# Patient Record
Sex: MALE | Race: WHITE | NOT HISPANIC OR LATINO | Employment: OTHER | URBAN - METROPOLITAN AREA
[De-identification: names, ages, dates, MRNs, and addresses within clinical notes are randomized per-mention and may not be internally consistent; named-entity substitution may affect disease eponyms.]

---

## 2017-01-30 ENCOUNTER — TRANSFERRED RECORDS (OUTPATIENT)
Dept: HEALTH INFORMATION MANAGEMENT | Facility: CLINIC | Age: 82
End: 2017-01-30

## 2017-03-17 NOTE — PATIENT INSTRUCTIONS
Change the blood pressure medicine from lisinopril to amlodipine and take that once a day.  If the leg cramps do not go away over the next 2 weeks let me know.  Preventive Health Recommendations:       Male Ages 65 and over    Yearly exam:             See your health care provider every year in order to  o   Review health changes.   o   Discuss preventive care.    o   Review your medicines if your doctor has prescribed any.    Talk with your health care provider about whether you should have a test to screen for prostate cancer (PSA).    Every 3 years, have a diabetes test (fasting glucose). If you are at risk for diabetes, you should have this test more often.    Every 5 years, have a cholesterol test. Have this test more often if you are at risk for high cholesterol or heart disease.     Every 10 years, have a colonoscopy. Or, have a yearly FIT test (stool test). These exams will check for colon cancer.    Talk to with your health care provider about screening for Abdominal Aortic Aneurysm if you have a family history of AAA or have a history of smoking.  Shots:     Get a flu shot each year.     Get a tetanus shot every 10 years.     Talk to your doctor about your pneumonia vaccines. There are now two you should receive - Pneumovax (PPSV 23) and Prevnar (PCV 13).    Talk to your doctor about a shingles vaccine.     Talk to your doctor about the hepatitis B vaccine.  Nutrition:     Eat at least 5 servings of fruits and vegetables each day.     Eat whole-grain bread, whole-wheat pasta and brown rice instead of white grains and rice.     Talk to your doctor about Calcium and Vitamin D.   Lifestyle    Exercise for at least 150 minutes a week (30 minutes a day, 5 days a week). This will help you control your weight and prevent disease.     Limit alcohol to one drink per day.     No smoking.     Wear sunscreen to prevent skin cancer.     See your dentist every six months for an exam and cleaning.     See your eye doctor  every 1 to 2 years to screen for conditions such as glaucoma, macular degeneration and cataracts.

## 2017-03-17 NOTE — PROGRESS NOTES
SUBJECTIVE:                                                            Jose Ray is a 82 year old male who presents for Preventive Visit.    The patient feels fine, active but not working out reg.  Has had urol follow up and concepción.  No c/o on review of systems x noct leg cramps, once a week or so, not in day, no maritza.  No other c/o               Past Medical History:      Past Medical History   Diagnosis Date     Complex renal cyst 6/14     seen on us done for htn, fu 6 months, fu done 8/15 and stable     Eczema      Gallstone 2010     seen on ct for renal stone     HTN (hypertension) 6/14     us done and no hydro but renal cyst     Hx of colonoscopy 2011     nl     Hydronephrosis of left kidney 2010     seen on above ct     Nephrolithiasis 2010     seen on ct, hydronephrosis seen as well     Prostate cancer (H) 2008     had surgery, D.r Michelle     Skin cancer, basal cell 2002, 2008     had xrt 2002, mohs surgery 2008     Thrombophlebitis 1985             Past Surgical History:      Past Surgical History   Procedure Laterality Date     C nonspecific procedure  14, 36     HERNIORRHAPHY     C nonspecific procedure  1985     VEIN STRIPPING     Mohs surgery  2008, 2011     basal cell     Turp  2008     prostate cancer     Circumcision  3/12/2013     Procedure: CIRCUMCISION;  CIRCUMCISION;  Surgeon: Stevenson Martinez MD;  Location: [facility]             Social History:     Social History     Social History     Marital status:      Spouse name: N/A     Number of children: 3     Years of education: N/A     Occupational History     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     Social History Main Topics     Smoking status: Never Smoker     Smokeless tobacco: Never Used     Alcohol use 0.0 oz/week     0 Standard drinks or equivalent per week      Comment: 0-2 drinks per week     Drug use: No     Sexual activity: No     Other Topics Concern     Not on file     Social History Narrative             Family History:   reviewed          "Allergies:     Allergies   Allergen Reactions     Cat Hair [Cats]      Pneumococcal Vaccine Rash             Medications:     Current Outpatient Prescriptions   Medication Sig Dispense Refill     lisinopril (PRINIVIL/ZESTRIL) 10 MG tablet Take 1 tablet (10 mg) by mouth daily 90 tablet 3     amLODIPine (NORVASC) 5 MG tablet Take 1 tablet (5 mg) by mouth daily 30 tablet 3     Cholecalciferol (VITAMIN D PO) Take  by mouth. 2300 IU daily       Multiple vitamin  s TABS Take 1 tablet by mouth daily.       Omega-3 Fatty Acids (SALMON OIL-1000 PO) Take 1 capsule by mouth 2 times daily.       Magnesium 400 MG CAPS Take 1 capsule by mouth daily.       OSTEO BI-FLEX ADV TRIPLE ST OR TABS 1 po bid       OS-REY ULTRA 600 MG OR TABS 1 TABLET TWICE DAILY       VITAMIN C 1000 MG OR TABS 1 TABLET DAILY       GARLIC 600 MG OR CAPS None Entered       ASPIRIN 325 MG OR TBEC ONE DAILY 100 3     [DISCONTINUED] lisinopril (PRINIVIL,ZESTRIL) 10 MG tablet Take 1 tablet (10 mg) by mouth daily 90 tablet 3               Review of Systems:   The 10 point Review of Systems is negative other than noted in the HPI           Physical Exam:   Blood pressure 111/70, pulse 79, temperature 97.2  F (36.2  C), temperature source Tympanic, height 5' 7\" (1.702 m), weight 172 lb (78 kg), SpO2 100 %.    Exam:  Constitutional: healthy appearing, alert and in no distress  Heent: Normocephalic. Head without obvious masses or lesions. PERRLDC, EOMI. Mouth exam within normal limits: tongue, mucous membranes, posterior pharynx all normal, no lesions or abnormalities seen.  Tm's and canals within normal limits bilaterally. Neck supple, no nuchal rigidity or masses. No supraclavicular, or cervical adenopathy. Thyroid symmetric, no masses.  Cardiovascular: Regular rate and rhythm, no murmer, rub or gallops.  JVP not elevated, no edema.  Carotids within normal limits bilaterally, no bruits.  Respiratory: Normal respiratory effort.  Lungs clear, normal flow, no " wheezing or crackles.  Breasts: Normal bilaterally.  No masses or lesions.  Nipples within normal limites.  No axillary lesions or nodes.  Gastrointestinal: Normal active bowel sounds.   Soft, not tender, no masses, guarding or rebound.  No hepatosplenomegaly.   Genitourinary: Rectal not done  Musculoskeletal: extremities normal, no gross deformities noted.  Skin: no suspicious lesions or rashes   Neurologic: Mental status within normal limits.  Speech fluent.  No gross motor abnormalities and gait intact.  Psychiatric: mentation appears normal and affect normal.         Data:   Labs sent        Assessment:   1. Normal cpx  2. Cap, concepción, follow up urol  3. Renal cyst, last us appeared benign  4. Hydronephrosis, none on last us  5. Elevated cholesterol, follow up labs         Plan:   Try change blood pressure to norvasc and see if noct cramps better, send me note 2 weeks  Exercise, diet  Letter with labs  Up to date immunizations, danika Lockett M.D.            Are you in the first 12 months of your Medicare Part B coverage?  No    Healthy Habits:    Do you get at least three servings of calcium containing foods daily (dairy, green leafy vegetables, etc.)? yes    Amount of exercise or daily activities, outside of work: 0 day(s) per week    Problems taking medications regularly No    Medication side effects: No    Have you had an eye exam in the past two years? yes    Do you see a dentist twice per year? yes    Do you have sleep apnea, excessive snoring or daytime drowsiness?yes    COGNITIVE SCREEN  1) Repeat 3 items (Banana, Sunrise, Chair)    2) Clock draw: NORMAL  3) 3 item recall: Recalls 3 objects  Results: 3 items recalled    Mini-CogTM Copyright S Sadia. Licensed by the author for use in [facility]; reprinted with permission (anita@.[facility]). All rights reserved.                Reviewed and updated as needed this visit by clinical staff         Reviewed and updated as needed this visit by  "Provider        Social History   Substance Use Topics     Smoking status: Never Smoker     Smokeless tobacco: Never Used     Alcohol use 0.0 oz/week     0 Standard drinks or equivalent per week      Comment: 0-2 drinks per week       The patient does not drink >3 drinks per day nor >7 drinks per week.    Today's PHQ-2 Score:   PHQ-2 ( 1999 Pf[employer]) 3/17/2016 3/14/2016   Q1: Little interest or pleasure in doing things 0 -   Q2: Feeling down, depressed or hopeless 0 -   PHQ-2 Score 0 -   Little interest or pleasure in doing things - Not at all   Feeling down, depressed or hopeless - Not at all   PHQ-2 Score - 0       Do you feel safe in your environment - Yes    Do you have a Health Care Directive?: Yes: Advance Directive has been received and scanned.    Current providers sharing in care for this patient include:   Patient Care Team:  Hosea Lockett MD as PCP - General (Internal Medicine)      Hearing impairment: No    Ability to successfully perform activities of daily living: Yes, no assistance needed     Fall risk:       Home safety:  none identified  Parul Perez MA      The following health maintenance items are reviewed in Epic and correct as of today:  Health Maintenance   Topic Date Due     FALL RISK ASSESSMENT  03/17/2017     INFLUENZA VACCINE (SYSTEM ASSIGNED)  09/01/2017     ADVANCE DIRECTIVE PLANNING Q5 YRS (NO INBASKET)  08/13/2019     TETANUS IMMUNIZATION (SYSTEM ASSIGNED)  02/21/2023     PNEUMOCOCCAL  Completed            End of Life Planning:  Patient currently has an advanced directive: yes    COUNSELING:  Reviewed preventive health counseling, as reflected in patient instructions       Regular exercise       Healthy diet/nutrition        Estimated body mass index is 27.57 kg/(m^2) as calculated from the following:    Height as of 3/17/16: 5' 7\" (1.702 m).    Weight as of 3/17/16: 176 lb (79.8 kg).     reports that he has never smoked. He has never used smokeless tobacco.      Appropriate " preventive services were discussed with this patient, including applicable screening as appropriate for cardiovascular disease, diabetes, osteopenia/osteoporosis, and glaucoma.  As appropriate for age/gender, discussed screening for colorectal cancer, prostate cancer, breast cancer, and cervical cancer. Checklist reviewing preventive services available has been given to the patient.    Reviewed patients plan of care and provided an AVS. The Basic Care Plan (routine screening as documented in Health Maintenance) for Jose meets the Care Plan requirement. This Care Plan has been established and reviewed with the Patient.    Counseling Resources:  ATP IV Guidelines  Pooled Cohorts Equation Calculator  Breast Cancer Risk Calculator  FRAX Risk Assessment  ICSI Preventive Guidelines  Dietary Guidelines for Americans, 2010  [employer] MyPlate  ASA Prophylaxis  Lung CA Screening    Hosea Lockett MD  [facility] for HPI/ROS submitted by the patient on 3/18/2017   Annual Exam:  Getting at least 3 servings of Calcium per day:: Yes  Bi-annual eye exam:: Yes  Dental care twice a year:: Yes  Sleep apnea or symptoms of sleep apnea:: None  Diet:: Regular (no restrictions)  Frequency of exercise:: None  Taking medications regularly:: Yes  Medication side effects:: None  Additional concerns today:: No  Q1: Little interest or pleasure in doing things: 0=Not at all  Q2: Feeling down, depressed or hopeless: 0=Not at all  PHQ-2 Score: 0

## 2017-03-20 ENCOUNTER — OFFICE VISIT (OUTPATIENT)
Dept: FAMILY MEDICINE | Facility: CLINIC | Age: 82
End: 2017-03-20
Payer: COMMERCIAL

## 2017-03-20 VITALS
SYSTOLIC BLOOD PRESSURE: 111 MMHG | TEMPERATURE: 97.2 F | DIASTOLIC BLOOD PRESSURE: 70 MMHG | WEIGHT: 172 LBS | BODY MASS INDEX: 27 KG/M2 | HEIGHT: 67 IN | HEART RATE: 79 BPM | OXYGEN SATURATION: 100 %

## 2017-03-20 DIAGNOSIS — N13.30 HYDRONEPHROSIS OF LEFT KIDNEY: ICD-10-CM

## 2017-03-20 DIAGNOSIS — I10 BENIGN ESSENTIAL HYPERTENSION: ICD-10-CM

## 2017-03-20 DIAGNOSIS — C61 PROSTATE CANCER (H): ICD-10-CM

## 2017-03-20 DIAGNOSIS — Z00.00 ROUTINE GENERAL MEDICAL EXAMINATION AT A HEALTH CARE FACILITY: Primary | ICD-10-CM

## 2017-03-20 DIAGNOSIS — R79.89 HIGH SERUM CREATINE: Primary | ICD-10-CM

## 2017-03-20 DIAGNOSIS — N28.1 COMPLEX RENAL CYST: ICD-10-CM

## 2017-03-20 DIAGNOSIS — E78.5 HYPERLIPIDEMIA LDL GOAL <160: ICD-10-CM

## 2017-03-20 LAB
ALBUMIN SERPL-MCNC: 3.9 G/DL (ref 3.4–5)
ALP SERPL-CCNC: 69 U/L (ref 40–150)
ALT SERPL W P-5'-P-CCNC: 23 U/L (ref 0–70)
ANION GAP SERPL CALCULATED.3IONS-SCNC: 8 MMOL/L (ref 3–14)
AST SERPL W P-5'-P-CCNC: 11 U/L (ref 0–45)
BILIRUB SERPL-MCNC: 0.9 MG/DL (ref 0.2–1.3)
BUN SERPL-MCNC: 35 MG/DL (ref 7–30)
CALCIUM SERPL-MCNC: 9.5 MG/DL (ref 8.5–10.1)
CHLORIDE SERPL-SCNC: 106 MMOL/L (ref 94–109)
CHOLEST SERPL-MCNC: 205 MG/DL
CO2 SERPL-SCNC: 27 MMOL/L (ref 20–32)
CREAT SERPL-MCNC: 1.36 MG/DL (ref 0.66–1.25)
ERYTHROCYTE [DISTWIDTH] IN BLOOD BY AUTOMATED COUNT: 13.1 % (ref 10–15)
GFR SERPL CREATININE-BSD FRML MDRD: 50 ML/MIN/1.7M2
GLUCOSE SERPL-MCNC: 103 MG/DL (ref 70–99)
HCT VFR BLD AUTO: 43.5 % (ref 40–53)
HDLC SERPL-MCNC: 117 MG/DL
HGB BLD-MCNC: 14 G/DL (ref 13.3–17.7)
LDLC SERPL CALC-MCNC: 75 MG/DL
MCH RBC QN AUTO: 31.5 PG (ref 26.5–33)
MCHC RBC AUTO-ENTMCNC: 32.2 G/DL (ref 31.5–36.5)
MCV RBC AUTO: 98 FL (ref 78–100)
NONHDLC SERPL-MCNC: 88 MG/DL
PLATELET # BLD AUTO: 275 10E9/L (ref 150–450)
POTASSIUM SERPL-SCNC: 4.4 MMOL/L (ref 3.4–5.3)
PROT SERPL-MCNC: 7.5 G/DL (ref 6.8–8.8)
RBC # BLD AUTO: 4.45 10E12/L (ref 4.4–5.9)
SODIUM SERPL-SCNC: 141 MMOL/L (ref 133–144)
TRIGL SERPL-MCNC: 65 MG/DL
WBC # BLD AUTO: 6 10E9/L (ref 4–11)

## 2017-03-20 PROCEDURE — 85027 COMPLETE CBC AUTOMATED: CPT | Performed by: INTERNAL MEDICINE

## 2017-03-20 PROCEDURE — 99212 OFFICE O/P EST SF 10 MIN: CPT | Mod: 25 | Performed by: INTERNAL MEDICINE

## 2017-03-20 PROCEDURE — 80061 LIPID PANEL: CPT | Performed by: INTERNAL MEDICINE

## 2017-03-20 PROCEDURE — G0439 PPPS, SUBSEQ VISIT: HCPCS | Performed by: INTERNAL MEDICINE

## 2017-03-20 PROCEDURE — 80053 COMPREHEN METABOLIC PANEL: CPT | Performed by: INTERNAL MEDICINE

## 2017-03-20 PROCEDURE — 36415 COLL VENOUS BLD VENIPUNCTURE: CPT | Performed by: INTERNAL MEDICINE

## 2017-03-20 RX ORDER — AMLODIPINE BESYLATE 5 MG/1
5 TABLET ORAL DAILY
Qty: 30 TABLET | Refills: 3 | Status: SHIPPED | OUTPATIENT
Start: 2017-03-20 | End: 2017-04-11

## 2017-03-20 RX ORDER — LISINOPRIL 10 MG/1
10 TABLET ORAL DAILY
Qty: 90 TABLET | Refills: 3 | Status: SHIPPED | OUTPATIENT
Start: 2017-03-20 | End: 2018-05-04

## 2017-03-20 NOTE — NURSING NOTE
"Chief Complaint   Patient presents with     Medicare Visit       Initial /70 (BP Location: Left arm, Cuff Size: Adult Large)  Pulse 79  Temp 97.2  F (36.2  C) (Tympanic)  Ht 5' 7\" (1.702 m)  Wt 172 lb (78 kg)  SpO2 100%  BMI 26.94 kg/m2 Estimated body mass index is 26.94 kg/(m^2) as calculated from the following:    Height as of this encounter: 5' 7\" (1.702 m).    Weight as of this encounter: 172 lb (78 kg).  Medication Reconciliation: complete     Parul Perez MA    "

## 2017-03-20 NOTE — MR AVS SNAPSHOT
After Visit Summary   3/20/2017    Jose Ray    MRN: [id number]           Patient Information     Date Of Birth          1935        Visit Information        Provider Department      3/20/2017 11:00 AM Hosea Lockett MD [facility]        Today's Diagnoses     Routine general medical examination at a health care facility    -  1    Benign essential hypertension        Prostate Cancer-8/08          Care Instructions    Change the blood pressure medicine from lisinopril to amlodipine and take that once a day.  If the leg cramps do not go away over the next 2 weeks let me know.  Preventive Health Recommendations:       Male Ages 65 and over    Yearly exam:             See your health care provider every year in order to  o   Review health changes.   o   Discuss preventive care.    o   Review your medicines if your doctor has prescribed any.    Talk with your health care provider about whether you should have a test to screen for prostate cancer (PSA).    Every 3 years, have a diabetes test (fasting glucose). If you are at risk for diabetes, you should have this test more often.    Every 5 years, have a cholesterol test. Have this test more often if you are at risk for high cholesterol or heart disease.     Every 10 years, have a colonoscopy. Or, have a yearly FIT test (stool test). These exams will check for colon cancer.    Talk to with your health care provider about screening for Abdominal Aortic Aneurysm if you have a family history of AAA or have a history of smoking.  Shots:     Get a flu shot each year.     Get a tetanus shot every 10 years.     Talk to your doctor about your pneumonia vaccines. There are now two you should receive - Pneumovax (PPSV 23) and Prevnar (PCV 13).    Talk to your doctor about a shingles vaccine.     Talk to your doctor about the hepatitis B vaccine.  Nutrition:     Eat at least 5 servings of fruits and vegetables each day.     Eat  whole-grain bread, whole-wheat pasta and brown rice instead of white grains and rice.     Talk to your doctor about Calcium and Vitamin D.   Lifestyle    Exercise for at least 150 minutes a week (30 minutes a day, 5 days a week). This will help you control your weight and prevent disease.     Limit alcohol to one drink per day.     No smoking.     Wear sunscreen to prevent skin cancer.     See your dentist every six months for an exam and cleaning.     See your eye doctor every 1 to 2 years to screen for conditions such as glaucoma, macular degeneration and cataracts.        Follow-ups after your visit        Who to contact     If you have questions or need follow up information about today's clinic visit or your schedule please contact [facility] directly at [phone].  Normal or non-critical lab and imaging results will be communicated to you by MyChart, letter or phone within 4 business days after the clinic has received the results. If you do not hear from us within 7 days, please contact the clinic through [employer] or phone. If you have a critical or abnormal lab result, we will notify you by phone as soon as possible.  Submit refill requests through [employer] or call your pharmacy and they will forward the refill request to us. Please allow 3 business days for your refill to be completed.          Additional Information About Your Visit        [employer] Information     [employer] gives you secure access to your electronic health record. If you see a primary care provider, you can also send messages to your care team and make appointments. If you have questions, please call your primary care clinic.  If you do not have a primary care provider, please call [phone] and they will assist you.        Care EveryWhere ID     This is your Care EveryWhere ID. This could be used by other organizations to access your [facility] medical records  [id number]        Your Vitals Were     Pulse Temperature Height  "Pulse Oximetry BMI (Body Mass Index)       79 97.2  F (36.2  C) (Tympanic) 5' 7\" (1.702 m) 100% 26.94 kg/m2        Blood Pressure from Last 3 Encounters:   03/20/17 111/70   03/17/16 133/79   11/17/15 112/63    Weight from Last 3 Encounters:   03/20/17 172 lb (78 kg)   03/17/16 176 lb (79.8 kg)   11/17/15 177 lb 6.4 oz (80.5 kg)              We Performed the Following     CBC with platelets     Comprehensive metabolic panel     Lipid panel reflex to direct LDL          Today's Medication Changes          These changes are accurate as of: 3/20/17 11:12 AM.  If you have any questions, ask your nurse or doctor.               Start taking these medicines.        Dose/Directions    amLODIPine 5 MG tablet   Commonly known as:  NORVASC   Used for:  Benign essential hypertension   Started by:  Hosea Lockett MD        Dose:  5 mg   Take 1 tablet (5 mg) by mouth daily   Quantity:  30 tablet   Refills:  3            Where to get your medicines      These medications were sent to [address] [address]     Phone:  [phone]     amLODIPine 5 MG tablet    lisinopril 10 MG tablet                Primary Care Provider Office Phone # Fax #    Hosea Lockett [phone]       [facility] [address] [place] [id number]        Thank you!     Thank you for choosing [facility]  for your care. Our goal is always to provide you with excellent care. Hearing back from our patients is one way we can continue to improve our services. Please take a few minutes to complete the written survey that you may receive in the mail after your visit with us. Thank you!             Your Updated Medication List - Protect others around you: Learn how to safely use, store and throw away your medicines at www.disposemymeds.org.          This list is accurate as of: 3/20/17 11:12 AM.  Always use your most recent med " list.                   Brand Name Dispense Instructions for use    amLODIPine 5 MG tablet    NORVASC    30 tablet    Take 1 tablet (5 mg) by mouth daily       ascorbic acid 1000 MG Tabs    vitamin C     1 TABLET DAILY       aspirin 325 MG EC tablet     100    ONE DAILY       Garlic 600 MG Caps      None Entered       lisinopril 10 MG tablet    PRINIVIL/ZESTRIL    90 tablet    Take 1 tablet (10 mg) by mouth daily       Magnesium 400 MG Caps      Take 1 capsule by mouth daily.       Multiple vitamin  s Tabs      Take 1 tablet by mouth daily.       OS-REY ULTRA 600 MG Tabs      1 TABLET TWICE DAILY       OSTEO BI-FLEX ADV TRIPLE ST Tabs      1 po bid       SALMON OIL-1000 PO      Take 1 capsule by mouth 2 times daily.       VITAMIN D PO      Take  by mouth. 2300 IU daily

## 2017-03-21 NOTE — PROGRESS NOTES
It was a pleasure to see you.  I wanted to let you know your test results.  Please let me know if you are not able to view them.    For the most part your labs look very good including your blood salts, sugar test, liver tests and proteins and your complete blood count.    Your cholesterol is a bit high at 205.  However, as you know you have a ton of hdl or good cholesterol and the ldl or bad is just fine.  In other words the numbers are super.    One of your kidney tests, the creatinine is just a bit higher this year. I am not worried, but to be safe I would like you to come back in 3 months just to repeat this lab.  You do not need to see me or fast but call before you come.    I am happy to bring you this overall excellent report.  I believe your overall health is very good.  If you have any questions please call me.    Hosea Lockett M.D.

## 2017-06-29 DIAGNOSIS — R79.89 HIGH SERUM CREATINE: ICD-10-CM

## 2017-06-29 PROCEDURE — 36415 COLL VENOUS BLD VENIPUNCTURE: CPT | Performed by: INTERNAL MEDICINE

## 2017-06-29 PROCEDURE — 82565 ASSAY OF CREATININE: CPT | Performed by: INTERNAL MEDICINE

## 2017-06-30 LAB
CREAT SERPL-MCNC: 1.23 MG/DL (ref 0.66–1.25)
GFR SERPL CREATININE-BSD FRML MDRD: 56 ML/MIN/1.7M2

## 2017-06-30 NOTE — PROGRESS NOTES
Hello,    Your kidney test is back down to normal which is great, so no problems.  If you have any questions please call me.    Hosea Lockett M.D.

## 2018-04-09 DIAGNOSIS — I10 BENIGN ESSENTIAL HYPERTENSION: ICD-10-CM

## 2018-04-09 NOTE — TELEPHONE ENCOUNTER
"amLODIPine (NORVASC) 5 MG tablet 90 tablet 3 4/11/2017       Last Written Prescription Date:  04/11/2017  Last Fill Quantity: 90,  # refills: 3   Last office visit: 3/20/2017 with prescribing provider:     Future Office Visit:  Unknown    Requested Prescriptions   Pending Prescriptions Disp Refills     amLODIPine (NORVASC) 5 MG tablet [Pharmacy Med Name: AMLODIPINE 5 MG TAB 5 TAB] 90 tablet 3     Sig: TAKE 1 TABLET BY MOUTH ONCE DAILY    Calcium Channel Blockers Protocol  Failed    4/9/2018 11:00 AM       Failed - Blood pressure under 140/90 in past 12 months    BP Readings from Last 3 Encounters:   03/20/17 111/70   03/17/16 133/79   11/17/15 112/63                Failed - Recent (12 mo) or future (30 days) visit within the authorizing provider's specialty    Patient had office visit in the last 12 months or has a visit in the next 30 days with authorizing provider or within the authorizing provider's specialty.  See \"Patient Info\" tab in inbasket, or \"Choose Columns\" in Meds & Orders section of the refill encounter.           Passed - Patient is age 18 or older       Passed - Normal serum creatinine on file in past 12 months    Recent Labs   Lab Test  06/29/17   1015   CR  1.23               "

## 2018-04-10 NOTE — TELEPHONE ENCOUNTER
To TC:  Please outreach to patient.  He is overdue for office visit with PCP.  Thank you.  Tameka Olvera RN

## 2018-04-18 RX ORDER — AMLODIPINE BESYLATE 5 MG/1
TABLET ORAL
Qty: 90 TABLET | Refills: 3 | Status: SHIPPED | OUTPATIENT
Start: 2018-04-18 | End: 2018-05-04

## 2018-04-18 NOTE — TELEPHONE ENCOUNTER
Pt is scheduled for Friday 5/4 at 2:30 pm with PCP.  Can we send in refill of medication as he is out and had to get a 3 day from the pharmacy.

## 2018-04-18 NOTE — TELEPHONE ENCOUNTER
To PCP:     Pt has OV scheduled for 5/4- Can he have 1 yr supply now rather than #30.     Please review and authorize if appropriate,     Thank you,   May FLORES RN

## 2018-05-04 ENCOUNTER — OFFICE VISIT (OUTPATIENT)
Dept: FAMILY MEDICINE | Facility: CLINIC | Age: 83
End: 2018-05-04
Payer: COMMERCIAL

## 2018-05-04 VITALS
SYSTOLIC BLOOD PRESSURE: 117 MMHG | TEMPERATURE: 97.6 F | DIASTOLIC BLOOD PRESSURE: 68 MMHG | HEIGHT: 67 IN | WEIGHT: 170.1 LBS | BODY MASS INDEX: 26.7 KG/M2 | HEART RATE: 68 BPM | OXYGEN SATURATION: 96 %

## 2018-05-04 DIAGNOSIS — C61 PROSTATE CANCER (H): Primary | ICD-10-CM

## 2018-05-04 DIAGNOSIS — N28.1 COMPLEX RENAL CYST: ICD-10-CM

## 2018-05-04 DIAGNOSIS — E78.5 HYPERLIPIDEMIA LDL GOAL <160: ICD-10-CM

## 2018-05-04 DIAGNOSIS — I10 BENIGN ESSENTIAL HYPERTENSION: ICD-10-CM

## 2018-05-04 LAB
ERYTHROCYTE [DISTWIDTH] IN BLOOD BY AUTOMATED COUNT: 13.2 % (ref 10–15)
HCT VFR BLD AUTO: 42.6 % (ref 40–53)
HGB BLD-MCNC: 14.1 G/DL (ref 13.3–17.7)
MCH RBC QN AUTO: 32.6 PG (ref 26.5–33)
MCHC RBC AUTO-ENTMCNC: 33.1 G/DL (ref 31.5–36.5)
MCV RBC AUTO: 98 FL (ref 78–100)
PLATELET # BLD AUTO: 267 10E9/L (ref 150–450)
RBC # BLD AUTO: 4.33 10E12/L (ref 4.4–5.9)
WBC # BLD AUTO: 6.1 10E9/L (ref 4–11)

## 2018-05-04 PROCEDURE — 85027 COMPLETE CBC AUTOMATED: CPT | Performed by: INTERNAL MEDICINE

## 2018-05-04 PROCEDURE — 80061 LIPID PANEL: CPT | Performed by: INTERNAL MEDICINE

## 2018-05-04 PROCEDURE — 84153 ASSAY OF PSA TOTAL: CPT | Performed by: INTERNAL MEDICINE

## 2018-05-04 PROCEDURE — 36415 COLL VENOUS BLD VENIPUNCTURE: CPT | Performed by: INTERNAL MEDICINE

## 2018-05-04 PROCEDURE — 99214 OFFICE O/P EST MOD 30 MIN: CPT | Performed by: INTERNAL MEDICINE

## 2018-05-04 PROCEDURE — 80048 BASIC METABOLIC PNL TOTAL CA: CPT | Performed by: INTERNAL MEDICINE

## 2018-05-04 RX ORDER — AMLODIPINE BESYLATE 5 MG/1
5 TABLET ORAL DAILY
Qty: 90 TABLET | Refills: 3 | Status: SHIPPED | OUTPATIENT
Start: 2018-05-04 | End: 2019-04-08

## 2018-05-04 NOTE — MR AVS SNAPSHOT
After Visit Summary   5/4/2018    Jose Ray    MRN: [id number]           Patient Information     Date Of Birth          1935        Visit Information        Provider Department      5/4/2018 2:30 PM Hosea Lockett MD [facility]        Today's Diagnoses     Prostate Cancer-8/08    -  1    Hyperlipidemia LDL goal <160        Benign essential hypertension        Complex renal cyst           Follow-ups after your visit        Future tests that were ordered for you today     Open Future Orders        Priority Expected Expires Ordered    US Renal Limited Routine 5/4/2018 5/4/2019 5/4/2018            Who to contact     If you have questions or need follow up information about today's clinic visit or your schedule please contact [facility] directly at [phone].  Normal or non-critical lab and imaging results will be communicated to you by MyChart, letter or phone within 4 business days after the clinic has received the results. If you do not hear from us within 7 days, please contact the clinic through MyChart or phone. If you have a critical or abnormal lab result, we will notify you by phone as soon as possible.  Submit refill requests through [employer] or call your pharmacy and they will forward the refill request to us. Please allow 3 business days for your refill to be completed.          Additional Information About Your Visit        MyChart Information     [employer] gives you secure access to your electronic health record. If you see a primary care provider, you can also send messages to your care team and make appointments. If you have questions, please call your primary care clinic.  If you do not have a primary care provider, please call [phone] and they will assist you.        Care EveryWhere ID     This is your Care EveryWhere ID. This could be used by other organizations to access your [facility] medical records  [id number]        Your Vitals Were   "   Pulse Temperature Height Pulse Oximetry BMI (Body Mass Index)       68 97.6  F (36.4  C) (Oral) 5' 7\" (1.702 m) 96% 26.64 kg/m2        Blood Pressure from Last 3 Encounters:   05/04/18 117/68   03/20/17 111/70   03/17/16 133/79    Weight from Last 3 Encounters:   05/04/18 170 lb 1.6 oz (77.2 kg)   03/20/17 172 lb (78 kg)   03/17/16 176 lb (79.8 kg)              We Performed the Following     Basic metabolic panel     CBC with platelets     Lipid panel reflex to direct LDL Non-fasting     PSA tumor marker          Today's Medication Changes          These changes are accurate as of 5/4/18  2:44 PM.  If you have any questions, ask your nurse or doctor.               These medicines have changed or have updated prescriptions.        Dose/Directions    amLODIPine 5 MG tablet   Commonly known as:  NORVASC   This may have changed:  See the new instructions.   Used for:  Benign essential hypertension   Changed by:  Hosea Lockett MD        Dose:  5 mg   Take 1 tablet (5 mg) by mouth daily   Quantity:  90 tablet   Refills:  3         Stop taking these medicines if you haven't already. Please contact your care team if you have questions.     lisinopril 10 MG tablet   Commonly known as:  PRINIVIL/ZESTRIL   Stopped by:  Hosea Lockett MD                Where to get your medicines      These medications were sent to [facility] [address]  [address] [address]     Phone:  [phone]     amLODIPine 5 MG tablet                Primary Care Provider Office Phone # Fax #    Hosea Lockett [phone] [address] [facility] [address]  [facility] [address]        Equal Access to Services     ENDER ROSENBAUM AH: Hadii woody huffman Soarpit, waaxda luqadaha, qaybta kaalmada adeegyada, mabel hardin. So [facility] [phone].    ATENCIÓN: Si habla español, tiene a langston disposición servicios gratuitos de asistencia lingüística. Llame " al [phone].    We comply with applicable federal civil rights laws and [place] laws. We do not discriminate on the basis of race, color, national origin, age, disability, sex, sexual orientation, or gender identity.            Thank you!     Thank you for choosing [facility]  for your care. Our goal is always to provide you with excellent care. Hearing back from our patients is one way we can continue to improve our services. Please take a few minutes to complete the written survey that you may receive in the mail after your visit with us. Thank you!             Your Updated Medication List - Protect others around you: Learn how to safely use, store and throw away your medicines at www.disposemymeds.org.          This list is accurate as of 5/4/18  2:44 PM.  Always use your most recent med list.                   Brand Name Dispense Instructions for use Diagnosis    amLODIPine 5 MG tablet    NORVASC    90 tablet    Take 1 tablet (5 mg) by mouth daily    Benign essential hypertension       ascorbic acid 1000 MG Tabs    vitamin C     1 TABLET DAILY    Mixed hyperlipidemia, Generalized osteoarthrosis, unspecified site, Embolism and thrombosis of unspecified site, Other and unspecified malignant neoplasm of scalp and skin of neck, Inguinal hernia without mention of obstruction or gangrene, bilateral, (not specified as recurrent)       aspirin 325 MG EC tablet     100    ONE DAILY        Garlic 600 MG Caps      None Entered    Mixed hyperlipidemia, Generalized osteoarthrosis, unspecified site, Embolism and thrombosis of unspecified site, Other and unspecified malignant neoplasm of scalp and skin of neck, Inguinal hernia without mention of obstruction or gangrene, bilateral, (not specified as recurrent)       Magnesium 400 MG Caps      Take 1 capsule by mouth daily.    Routine general medical examination at a health care facility       Multiple vitamin  s Tabs      Take 1 tablet by mouth daily.     Routine general medical examination at a health care facility       OS-REY ULTRA 600 MG Tabs      1 TABLET TWICE DAILY    Mixed hyperlipidemia, Generalized osteoarthrosis, unspecified site, Embolism and thrombosis of unspecified site, Other and unspecified malignant neoplasm of scalp and skin of neck, Inguinal hernia without mention of obstruction or gangrene, bilateral, (not specified as recurrent)       OSTEO BI-FLEX ADV TRIPLE ST Tabs      1 po bid    Mixed hyperlipidemia, Generalized osteoarthrosis, unspecified site, Embolism and thrombosis of unspecified site, Other and unspecified malignant neoplasm of scalp and skin of neck, Inguinal hernia without mention of obstruction or gangrene, bilateral, (not specified as recurrent)       SALMON OIL-1000 PO      Take 1 capsule by mouth 2 times daily.    Routine general medical examination at a health care facility       VITAMIN D PO      Take  by mouth. 2300 IU daily

## 2018-05-04 NOTE — PROGRESS NOTES
The patient is here for follow-up on his medical issues.  He is doing very well although not exercising regularly.  Last year we stopped his lisinopril and put him on amlodipine for his hypertension due to nocturnal leg cramps.  He thinks it may be a little better.  He still has some of them.  He does not have claudication.    The patient has a history of prostate cancer.  He has not seen urology anymore.    The patient has a history of renal cysts.  We will get a follow-up on that.    He otherwise feels quite well.  No other HEENT, cardiovascular, respiratory, GI or  symptoms.    Past Medical History:   Diagnosis Date     Complex renal cyst 6/14    seen on us done for htn, fu 6 months, fu done 8/15 and stable     Eczema      Gallstone 2010    seen on ct for renal stone     HTN (hypertension) 6/14    us done and no hydro but renal cyst     Hx of colonoscopy 2011    nl     Hydronephrosis of left kidney 2010    seen on above ct     Nephrolithiasis 2010    seen on ct, hydronephrosis seen as well     Prostate cancer (H) 2008    had surgery, D.r Michelle     Skin cancer, basal cell 2002, 2008    had xrt 2002, mohs surgery 2008     Thrombophlebitis 1985     Past Surgical History:   Procedure Laterality Date     C NONSPECIFIC PROCEDURE  14, 36    HERNIORRHAPHY     C NONSPECIFIC PROCEDURE  1985    VEIN STRIPPING     CIRCUMCISION  3/12/2013    Procedure: CIRCUMCISION;  CIRCUMCISION;  Surgeon: Stevenson Martinez MD;  [facility]: [facility]     mohs surgery  2008, 2011    basal cell     TURP  2008    prostate cancer     Social History     Social History     Marital status:      Spouse name: N/A     Number of children: 3     Years of education: N/A     Occupational History     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 2300 IU daily       GARLIC 600 MG OR CAPS None Entered       Magnesium 400 MG CAPS Take 1 capsule by mouth daily.       Multiple vitamin  s TABS Take 1 tablet by mouth daily.       Omega-3 Fatty Acids (SALMON OIL-1000 PO) Take 1 capsule by mouth 2 times daily.       OS-REY ULTRA 600 MG OR TABS 1 TABLET TWICE DAILY       OSTEO BI-FLEX ADV TRIPLE ST OR TABS 1 po bid       VITAMIN C 1000 MG OR TABS 1 TABLET DAILY       [DISCONTINUED] amLODIPine (NORVASC) 5 MG tablet TAKE 1 TABLET BY MOUTH ONCE DAILY 90 tablet 3     Allergies   Allergen Reactions     Cat Hair [Cats]      Pneumococcal Vaccine Rash     FAMILY HISTORY NOTED AND REVIEWED    REVIEW OF SYSTEMS: above    PHYSICAL EXAM    /68 (BP Location: Left arm, Patient Position: Chair, Cuff Size: Adult Regular)  Pulse 68  Temp 97.6  F (36.4  C) (Oral)  Ht 5' 7\" (1.702 m)  Wt 170 lb 1.6 oz (77.2 kg)  SpO2 96%  BMI 26.64 kg/m2    Patient appears non toxic  Mouth - tongue midline and within normal limits, mucous membranes and posterior pharynx within normal limits, no lesions seen.  Neck - no masses, lesions or tenderness  Nodes - no supraclavicular, cervical or axially adenopathy .  Lungs - clear, normal flow  Cardiovascular - regular rate and rhythm, no murmer, rub or gallop, no jvp or edema, carotids within normal limits, no bruits.  Abdomen - normal active bowel sounds, soft, non tender, no masses, guarding or rebound, no hepatosplenomegaly      Labs sent, us to be done    ASSESSMENT:  1. Hypertension, controlled  2. Cap, concepción, follow up psa  3. Elevated cholesterol but good ratio  4. Renal cyst, to get follow up us  5. " hcm    PLAN:  shingrx at pharm  Letter with labs  Exercise, diet      Hosea Lockett M.D.

## 2018-05-05 LAB
ANION GAP SERPL CALCULATED.3IONS-SCNC: 10 MMOL/L (ref 3–14)
BUN SERPL-MCNC: 28 MG/DL (ref 7–30)
CALCIUM SERPL-MCNC: 8.6 MG/DL (ref 8.5–10.1)
CHLORIDE SERPL-SCNC: 110 MMOL/L (ref 94–109)
CHOLEST SERPL-MCNC: 195 MG/DL
CO2 SERPL-SCNC: 22 MMOL/L (ref 20–32)
CREAT SERPL-MCNC: 1.06 MG/DL (ref 0.66–1.25)
GFR SERPL CREATININE-BSD FRML MDRD: 67 ML/MIN/1.7M2
GLUCOSE SERPL-MCNC: 92 MG/DL (ref 70–99)
HDLC SERPL-MCNC: 107 MG/DL
LDLC SERPL CALC-MCNC: 69 MG/DL
NONHDLC SERPL-MCNC: 88 MG/DL
POTASSIUM SERPL-SCNC: 4.2 MMOL/L (ref 3.4–5.3)
PSA SERPL-MCNC: 0.03 UG/L (ref 0–4)
SODIUM SERPL-SCNC: 142 MMOL/L (ref 133–144)
TRIGL SERPL-MCNC: 97 MG/DL

## 2018-05-05 NOTE — PROGRESS NOTES
It was a please seeing you.  You should be able to view your labs.    Your labs look very good.  Your psa is nice and low and your blood salts, kidney tests, sugar test, complete blood count and cholesterol are all very good.    If you have any questions please call me.    Hosea Lockett M.D.

## 2018-06-08 ENCOUNTER — HOSPITAL ENCOUNTER (OUTPATIENT)
Dept: ULTRASOUND IMAGING | Facility: CLINIC | Age: 83
Discharge: HOME | End: 2018-06-08
Attending: INTERNAL MEDICINE | Admitting: INTERNAL MEDICINE
Payer: COMMERCIAL

## 2018-06-08 ENCOUNTER — TELEPHONE (OUTPATIENT)
Dept: FAMILY MEDICINE | Facility: CLINIC | Age: 83
End: 2018-06-08

## 2018-06-08 DIAGNOSIS — N28.1 COMPLEX RENAL CYST: ICD-10-CM

## 2018-06-08 PROCEDURE — 76770 US EXAM ABDO BACK WALL COMP: CPT

## 2018-06-08 NOTE — TELEPHONE ENCOUNTER
Dr. Lockett,    Patient had US Renal Limited ordered today for renal cyst.  Should this be US Renal complete as in past?  April with radiology calling to clarify.    She did do both just in case.  Please advise.  Thank you.  Tameka Olvera RN

## 2018-06-10 NOTE — PROGRESS NOTES
Mr. Ray,    Your ultrasound looks good.  The cyst appears to be benign so no follow up is needed.  You have a small non obstructing kidney stone but that is not a problem.  If you have any questions please call me.    Hosea Lockett M.D.

## 2019-01-02 ENCOUNTER — HOSPITAL ENCOUNTER (INPATIENT)
Facility: CLINIC | Age: 84
LOS: 1 days | Discharge: HOME | DRG: 378 | End: 2019-01-03
Attending: EMERGENCY MEDICINE | Admitting: INTERNAL MEDICINE
Payer: COMMERCIAL

## 2019-01-02 DIAGNOSIS — K92.2 GASTROINTESTINAL HEMORRHAGE, UNSPECIFIED GASTROINTESTINAL HEMORRHAGE TYPE: ICD-10-CM

## 2019-01-02 DIAGNOSIS — K62.5 BRIGHT RED BLOOD PER RECTUM: ICD-10-CM

## 2019-01-02 PROBLEM — K92.1 HEMATOCHEZIA: Status: ACTIVE | Noted: 2019-01-02

## 2019-01-02 LAB
ALBUMIN SERPL-MCNC: 3.2 G/DL (ref 3.4–5)
ALP SERPL-CCNC: 62 U/L (ref 40–150)
ALT SERPL W P-5'-P-CCNC: 22 U/L (ref 0–70)
ANION GAP SERPL CALCULATED.3IONS-SCNC: 9 MMOL/L (ref 3–14)
AST SERPL W P-5'-P-CCNC: 15 U/L (ref 0–45)
BASOPHILS # BLD AUTO: 0.1 10E9/L (ref 0–0.2)
BASOPHILS NFR BLD AUTO: 0.9 %
BILIRUB SERPL-MCNC: 0.5 MG/DL (ref 0.2–1.3)
BUN SERPL-MCNC: 31 MG/DL (ref 7–30)
CALCIUM SERPL-MCNC: 8 MG/DL (ref 8.5–10.1)
CHLORIDE SERPL-SCNC: 112 MMOL/L (ref 94–109)
CO2 SERPL-SCNC: 22 MMOL/L (ref 20–32)
CREAT SERPL-MCNC: 1.07 MG/DL (ref 0.66–1.25)
DIFFERENTIAL METHOD BLD: ABNORMAL
EOSINOPHIL # BLD AUTO: 0.5 10E9/L (ref 0–0.7)
EOSINOPHIL NFR BLD AUTO: 9.5 %
ERYTHROCYTE [DISTWIDTH] IN BLOOD BY AUTOMATED COUNT: 13.2 % (ref 10–15)
GFR SERPL CREATININE-BSD FRML MDRD: 64 ML/MIN/{1.73_M2}
GLUCOSE SERPL-MCNC: 113 MG/DL (ref 70–99)
HCT VFR BLD AUTO: 34.1 % (ref 40–53)
HEMOCCULT STL QL: POSITIVE
HGB BLD-MCNC: 10.1 G/DL (ref 13.3–17.7)
HGB BLD-MCNC: 10.1 G/DL (ref 13.3–17.7)
HGB BLD-MCNC: 11.3 G/DL (ref 13.3–17.7)
HGB BLD-MCNC: 9.9 G/DL (ref 13.3–17.7)
IMM GRANULOCYTES # BLD: 0 10E9/L (ref 0–0.4)
IMM GRANULOCYTES NFR BLD: 0.2 %
INR PPP: 1.06 (ref 0.86–1.14)
INTERPRETATION ECG - MUSE: NORMAL
LACTATE BLD-SCNC: 0.6 MMOL/L (ref 0.7–2)
LYMPHOCYTES # BLD AUTO: 1.8 10E9/L (ref 0.8–5.3)
LYMPHOCYTES NFR BLD AUTO: 32.7 %
MCH RBC QN AUTO: 32.2 PG (ref 26.5–33)
MCHC RBC AUTO-ENTMCNC: 33.1 G/DL (ref 31.5–36.5)
MCV RBC AUTO: 97 FL (ref 78–100)
MONOCYTES # BLD AUTO: 0.6 10E9/L (ref 0–1.3)
MONOCYTES NFR BLD AUTO: 10.2 %
NEUTROPHILS # BLD AUTO: 2.5 10E9/L (ref 1.6–8.3)
NEUTROPHILS NFR BLD AUTO: 46.5 %
NRBC # BLD AUTO: 0 10*3/UL
NRBC BLD AUTO-RTO: 0 /100
PLATELET # BLD AUTO: 218 10E9/L (ref 150–450)
POTASSIUM SERPL-SCNC: 3.9 MMOL/L (ref 3.4–5.3)
PROT SERPL-MCNC: 6.4 G/DL (ref 6.8–8.8)
RBC # BLD AUTO: 3.51 10E12/L (ref 4.4–5.9)
SODIUM SERPL-SCNC: 143 MMOL/L (ref 133–144)
WBC # BLD AUTO: 5.4 10E9/L (ref 4–11)

## 2019-01-02 PROCEDURE — 85610 PROTHROMBIN TIME: CPT | Performed by: EMERGENCY MEDICINE

## 2019-01-02 PROCEDURE — 99222 1ST HOSP IP/OBS MODERATE 55: CPT | Mod: AI | Performed by: INTERNAL MEDICINE

## 2019-01-02 PROCEDURE — 96374 THER/PROPH/DIAG INJ IV PUSH: CPT

## 2019-01-02 PROCEDURE — 25000128 H RX IP 250 OP 636: Performed by: INTERNAL MEDICINE

## 2019-01-02 PROCEDURE — 93005 ELECTROCARDIOGRAM TRACING: CPT

## 2019-01-02 PROCEDURE — 25000132 ZZH RX MED GY IP 250 OP 250 PS 637: Performed by: PHYSICIAN ASSISTANT

## 2019-01-02 PROCEDURE — 25000128 H RX IP 250 OP 636: Performed by: EMERGENCY MEDICINE

## 2019-01-02 PROCEDURE — 85018 HEMOGLOBIN: CPT | Performed by: INTERNAL MEDICINE

## 2019-01-02 PROCEDURE — 80053 COMPREHEN METABOLIC PANEL: CPT | Performed by: EMERGENCY MEDICINE

## 2019-01-02 PROCEDURE — 99285 EMERGENCY DEPT VISIT HI MDM: CPT

## 2019-01-02 PROCEDURE — C9113 INJ PANTOPRAZOLE SODIUM, VIA: HCPCS | Performed by: INTERNAL MEDICINE

## 2019-01-02 PROCEDURE — 99207 ZZC CDG-MDM COMPONENT: MEETS LOW - DOWN CODED: CPT | Performed by: INTERNAL MEDICINE

## 2019-01-02 PROCEDURE — C9113 INJ PANTOPRAZOLE SODIUM, VIA: HCPCS | Performed by: EMERGENCY MEDICINE

## 2019-01-02 PROCEDURE — 12000000 ZZH R&B MED SURG/OB

## 2019-01-02 PROCEDURE — 83605 ASSAY OF LACTIC ACID: CPT | Performed by: EMERGENCY MEDICINE

## 2019-01-02 PROCEDURE — 82272 OCCULT BLD FECES 1-3 TESTS: CPT | Performed by: EMERGENCY MEDICINE

## 2019-01-02 PROCEDURE — 36415 COLL VENOUS BLD VENIPUNCTURE: CPT

## 2019-01-02 PROCEDURE — 25000132 ZZH RX MED GY IP 250 OP 250 PS 637: Performed by: INTERNAL MEDICINE

## 2019-01-02 PROCEDURE — 85025 COMPLETE CBC W/AUTO DIFF WBC: CPT | Performed by: EMERGENCY MEDICINE

## 2019-01-02 PROCEDURE — 36415 COLL VENOUS BLD VENIPUNCTURE: CPT | Performed by: INTERNAL MEDICINE

## 2019-01-02 RX ORDER — POTASSIUM CHLORIDE 29.8 MG/ML
20 INJECTION INTRAVENOUS
Status: DISCONTINUED | OUTPATIENT
Start: 2019-01-02 | End: 2019-01-03 | Stop reason: HOSPADM

## 2019-01-02 RX ORDER — PROCHLORPERAZINE 25 MG
12.5 SUPPOSITORY, RECTAL RECTAL EVERY 12 HOURS PRN
Status: DISCONTINUED | OUTPATIENT
Start: 2019-01-02 | End: 2019-01-03 | Stop reason: HOSPADM

## 2019-01-02 RX ORDER — ACETAMINOPHEN 325 MG/1
650 TABLET ORAL EVERY 4 HOURS PRN
Status: DISCONTINUED | OUTPATIENT
Start: 2019-01-02 | End: 2019-01-03 | Stop reason: HOSPADM

## 2019-01-02 RX ORDER — NALOXONE HYDROCHLORIDE 0.4 MG/ML
.1-.4 INJECTION, SOLUTION INTRAMUSCULAR; INTRAVENOUS; SUBCUTANEOUS
Status: DISCONTINUED | OUTPATIENT
Start: 2019-01-02 | End: 2019-01-03 | Stop reason: HOSPADM

## 2019-01-02 RX ORDER — POTASSIUM CHLORIDE 1500 MG/1
20-40 TABLET, EXTENDED RELEASE ORAL
Status: DISCONTINUED | OUTPATIENT
Start: 2019-01-02 | End: 2019-01-03 | Stop reason: HOSPADM

## 2019-01-02 RX ORDER — MAGNESIUM CARB/ALUMINUM HYDROX 105-160MG
296 TABLET,CHEWABLE ORAL ONCE
Status: COMPLETED | OUTPATIENT
Start: 2019-01-03 | End: 2019-01-03

## 2019-01-02 RX ORDER — GUAIFENESIN 600 MG/1
600 TABLET, EXTENDED RELEASE ORAL 2 TIMES DAILY
COMMUNITY
End: 2020-02-25

## 2019-01-02 RX ORDER — POLYETHYLENE GLYCOL 3350 17 G/17G
238 POWDER, FOR SOLUTION ORAL ONCE
Status: COMPLETED | OUTPATIENT
Start: 2019-01-02 | End: 2019-01-02

## 2019-01-02 RX ORDER — BISACODYL 10 MG
10 SUPPOSITORY, RECTAL RECTAL DAILY PRN
Status: DISCONTINUED | OUTPATIENT
Start: 2019-01-02 | End: 2019-01-03 | Stop reason: HOSPADM

## 2019-01-02 RX ORDER — AMOXICILLIN 250 MG
1 CAPSULE ORAL 2 TIMES DAILY PRN
Status: DISCONTINUED | OUTPATIENT
Start: 2019-01-02 | End: 2019-01-03 | Stop reason: HOSPADM

## 2019-01-02 RX ORDER — BISACODYL 5 MG
10 TABLET, DELAYED RELEASE (ENTERIC COATED) ORAL ONCE
Status: COMPLETED | OUTPATIENT
Start: 2019-01-02 | End: 2019-01-02

## 2019-01-02 RX ORDER — AMOXICILLIN 250 MG
2 CAPSULE ORAL 2 TIMES DAILY PRN
Status: DISCONTINUED | OUTPATIENT
Start: 2019-01-02 | End: 2019-01-03 | Stop reason: HOSPADM

## 2019-01-02 RX ORDER — POLYETHYLENE GLYCOL 3350 17 G/17G
17 POWDER, FOR SOLUTION ORAL DAILY PRN
Status: DISCONTINUED | OUTPATIENT
Start: 2019-01-02 | End: 2019-01-03 | Stop reason: HOSPADM

## 2019-01-02 RX ORDER — POTASSIUM CHLORIDE 1.5 G/1.58G
20-40 POWDER, FOR SOLUTION ORAL
Status: DISCONTINUED | OUTPATIENT
Start: 2019-01-02 | End: 2019-01-03 | Stop reason: HOSPADM

## 2019-01-02 RX ORDER — POTASSIUM CHLORIDE 7.45 MG/ML
10 INJECTION INTRAVENOUS
Status: DISCONTINUED | OUTPATIENT
Start: 2019-01-02 | End: 2019-01-03 | Stop reason: HOSPADM

## 2019-01-02 RX ORDER — IBUPROFEN 200 MG
400 TABLET ORAL 3 TIMES DAILY PRN
Status: ON HOLD | COMMUNITY
End: 2019-01-03

## 2019-01-02 RX ORDER — MAGNESIUM SULFATE HEPTAHYDRATE 40 MG/ML
4 INJECTION, SOLUTION INTRAVENOUS EVERY 4 HOURS PRN
Status: DISCONTINUED | OUTPATIENT
Start: 2019-01-02 | End: 2019-01-03 | Stop reason: HOSPADM

## 2019-01-02 RX ORDER — ONDANSETRON 2 MG/ML
4 INJECTION INTRAMUSCULAR; INTRAVENOUS EVERY 6 HOURS PRN
Status: DISCONTINUED | OUTPATIENT
Start: 2019-01-02 | End: 2019-01-03 | Stop reason: HOSPADM

## 2019-01-02 RX ORDER — PROCHLORPERAZINE MALEATE 5 MG
5 TABLET ORAL EVERY 6 HOURS PRN
Status: DISCONTINUED | OUTPATIENT
Start: 2019-01-02 | End: 2019-01-03 | Stop reason: HOSPADM

## 2019-01-02 RX ORDER — AMLODIPINE BESYLATE 5 MG/1
5 TABLET ORAL DAILY
Status: DISCONTINUED | OUTPATIENT
Start: 2019-01-02 | End: 2019-01-03 | Stop reason: HOSPADM

## 2019-01-02 RX ORDER — CALCIUM CARBONATE 500 MG/1
1000 TABLET, CHEWABLE ORAL 4 TIMES DAILY PRN
Status: DISCONTINUED | OUTPATIENT
Start: 2019-01-02 | End: 2019-01-03 | Stop reason: HOSPADM

## 2019-01-02 RX ORDER — POTASSIUM CL/LIDO/0.9 % NACL 10MEQ/0.1L
10 INTRAVENOUS SOLUTION, PIGGYBACK (ML) INTRAVENOUS
Status: DISCONTINUED | OUTPATIENT
Start: 2019-01-02 | End: 2019-01-03 | Stop reason: HOSPADM

## 2019-01-02 RX ORDER — ONDANSETRON 4 MG/1
4 TABLET, ORALLY DISINTEGRATING ORAL EVERY 6 HOURS PRN
Status: DISCONTINUED | OUTPATIENT
Start: 2019-01-02 | End: 2019-01-03 | Stop reason: HOSPADM

## 2019-01-02 RX ORDER — SODIUM CHLORIDE 9 MG/ML
INJECTION, SOLUTION INTRAVENOUS CONTINUOUS
Status: DISCONTINUED | OUTPATIENT
Start: 2019-01-02 | End: 2019-01-03 | Stop reason: HOSPADM

## 2019-01-02 RX ORDER — TRIAMCINOLONE ACETONIDE 1 MG/G
CREAM TOPICAL DAILY PRN
COMMUNITY

## 2019-01-02 RX ADMIN — POLYETHYLENE GLYCOL 3350 238 G: 17 POWDER, FOR SOLUTION ORAL at 16:30

## 2019-01-02 RX ADMIN — SODIUM CHLORIDE: 9 INJECTION, SOLUTION INTRAVENOUS at 22:49

## 2019-01-02 RX ADMIN — BISACODYL 10 MG: 5 TABLET, COATED ORAL at 15:04

## 2019-01-02 RX ADMIN — SODIUM CHLORIDE: 9 INJECTION, SOLUTION INTRAVENOUS at 04:29

## 2019-01-02 RX ADMIN — PANTOPRAZOLE SODIUM 40 MG: 40 INJECTION, POWDER, FOR SOLUTION INTRAVENOUS at 15:04

## 2019-01-02 RX ADMIN — PANTOPRAZOLE SODIUM 40 MG: 40 INJECTION, POWDER, FOR SOLUTION INTRAVENOUS at 03:33

## 2019-01-02 RX ADMIN — Medication 1 LOZENGE: at 18:49

## 2019-01-02 RX ADMIN — AMLODIPINE BESYLATE 5 MG: 5 TABLET ORAL at 09:36

## 2019-01-02 RX ADMIN — SODIUM CHLORIDE: 9 INJECTION, SOLUTION INTRAVENOUS at 13:48

## 2019-01-02 ASSESSMENT — ENCOUNTER SYMPTOMS
BLOOD IN STOOL: 1
DIARRHEA: 1
VOMITING: 0
FEVER: 0
ABDOMINAL PAIN: 0

## 2019-01-02 ASSESSMENT — MIFFLIN-ST. JEOR: SCORE: 1463.76

## 2019-01-02 ASSESSMENT — ACTIVITIES OF DAILY LIVING (ADL)
ADLS_ACUITY_SCORE: 10

## 2019-01-02 NOTE — CONSULTS
GASTROENTEROLOGY CONSULTATION     Jose Ray   [address] [place]  [facility] [address]   83 year old male   Admission Date/Time: 1/2/2019   Encounter Date: 1/2/2019  Primary Care Provider: Hosea Lockett     Referring / Attending Physician: Lenny Chambers   We were asked to see the patient in consultation by Dr. Chambers for evaluation of GI bleed.     HPI: Jose Ray is a 83 year old male with a past medical history significant for HTN, eczema, thrombophlebitis prostate cancer and a complex renal cyst who presented to the emergency department last night for evaluation of rectal bleeding. He returned home from a trip to [facility] and [place] about 5 days ago and yesterday he began passing bright red blood per rectum. He denies any melena. He also denies nausea, vomiting or abdominal pain. He had been taking ibuprofen when overseas as he was doing quite a bit of walking.  In the emergency department he was found to be slightly hypertensive but otherwise had normal vitals.  Labs were checked which were remarkable for a white count of 5.4, hemoglobin 11.3 and a normal lactic acid.  Fecal guaiac was performed which was positive.  He was admitted to the floor for further evaluation.  This morning the patient states that he continues to pass some bright red blood.  He continues to deny any abdominal pain nausea or vomiting.  He had a similar episode in 2011 and had a colonoscopy at that time through colon and rectal surgery which was unremarkable except for a stool ball covered in blood which was in a diverticulum so it was thought that he had a diverticular bleed at that time.    Past Medical History  Past Medical History:   Diagnosis Date     Complex renal cyst 06/2014    seen on us done for htn, fu 6 months, fu done 8/15 and stable; fu 6/18 simple cyst, a bit larger     Eczema      Gallstone 2010    seen on ct for renal stone     HTN (hypertension) 6/14    us done and no hydro but renal cyst     Hx of  colonoscopy 2011    nl     Hydronephrosis of left kidney 2010    seen on above ct     Nephrolithiasis 2010    seen on ct, hydronephrosis seen as well     Prostate cancer (H) 2008    had surgery, D.r Michelle     Skin cancer, basal cell 2002, 2008    had xrt 2002, mohs surgery 2008     Thrombophlebitis 1985       Past Surgical History  Past Surgical History:   Procedure Laterality Date     C NONSPECIFIC PROCEDURE  14, 36    HERNIORRHAPHY     C NONSPECIFIC PROCEDURE  1985    VEIN STRIPPING     CIRCUMCISION  3/12/2013    Procedure: CIRCUMCISION;  CIRCUMCISION;  Surgeon: Stevenson Martinez MD;  Location: [facility]     mohs surgery  2008, 2011    basal cell     TURP  2008    prostate cancer       Family History  Family History   Problem Relation Age of Onset     Hypertension Mother      Arthritis Mother      Gastrointestinal Disease Mother      Cancer Father         Lung     C.A.D. Paternal Grandmother         ?     Heart Disease Maternal Grandmother         ?       Social History  Social History     Socioeconomic History     Marital status:      Spouse name: Not on file     Number of children: 3     Years of education: Not on file     Highest education level: Not on file   Social Needs     Financial resource strain: Not on file     Food insecurity - worry: Not on file     Food insecurity - inability: Not on file     Transportation needs - medical: Not on file     Transportation needs - non-medical: Not on file   Occupational History     Occupation: [employer]     Employer: RETIRED   Tobacco Use     Smoking status: Never Smoker     Smokeless tobacco: Never Used   Substance and Sexual Activity     Alcohol use: Yes     Alcohol/week: 0.0 oz     Comment: 0-2 drinks per week     Drug use: No     Sexual activity: No   Other Topics Concern     Parent/sibling w/ CABG, MI or angioplasty before 65F 55M? Not Asked   Social History Narrative     Not on file       Medications  Prior to Admission medications    Medication Sig Start  "Date End Date Taking? Authorizing Provider   amLODIPine (NORVASC) 5 MG tablet Take 1 tablet (5 mg) by mouth daily 5/4/18   Hosea Lockett MD   ASPIRIN 325 MG OR TBEC ONE DAILY 2/15/07   Stevenson Chisholm MD   Cholecalciferol (VITAMIN D PO) Take  by mouth. 2300 IU daily    Reported, Patient   GARLIC 600 MG OR CAPS None Entered    Reported, Patient   Magnesium 400 MG CAPS Take 1 capsule by mouth daily.    Reported, Patient   Multiple vitamin  s TABS Take 1 tablet by mouth daily.    Reported, Patient   Omega-3 Fatty Acids (SALMON OIL-1000 PO) Take 1 capsule by mouth 2 times daily.    Reported, Patient   OS-REY ULTRA 600 MG OR TABS 1 TABLET TWICE DAILY    Reported, Patient   OSTEO BI-FLEX ADV TRIPLE ST OR TABS 1 po bid    Reported, Patient   VITAMIN C 1000 MG OR TABS 1 TABLET DAILY    Reported, Patient       Allergies:  Cat hair [cats] and Pneumococcal vaccine    ROS: A ten point review of systems was obtained and negative other than the symptoms noted above in the HPI.     Physical Exam:   /88 (BP Location: Right arm)   Pulse 77   Temp 96.3  F (35.7  C) (Oral)   Resp 16   Ht 1.778 m (5' 10\")   Wt 75.8 kg (167 lb)   SpO2 95%   BMI 23.96 kg/m     Constitutional: age appropriate, alert, no acute distress  Cardiovascular: regular rate and rhythm, no murmurs,rubs or gallops  Respiratory: clear to auscultation bilaterally  Psychiatric: normal pleasant affect  Head: atraumatic, normocephalic  Neck: supple, no thyromegaly  ENT: mucous membranes are moist, no oral lesions are noted  Abdomen: soft, non-tender, non-distended, normally active bowel sound. No masses or hepatosplenomegaly is appreciated. No rebound tenderness or guarding  Neuro: Neurologically intact grossly  Skin: warm, dry, no rashes are noted    ADDITIONAL COMMENTS:   I reviewed the patient's new clinical lab test results.   Recent Labs   Lab Test 01/02/19  0611 01/02/19  0133 05/04/18  1448 03/20/17  1121   WBC  --  5.4 6.1 6.0   HGB 10.1* " 11.3* 14.1 14.0   MCV  --  97 98 98   PLT  --  218 267 275   INR  --  1.06  --   --       Recent Labs   Lab Test 01/02/19  0133 05/04/18  1448 06/29/17  1015 03/20/17  1121    142  --  141   POTASSIUM 3.9 4.2  --  4.4   CHLORIDE 112* 110*  --  106   CO2 22 22  --  27   BUN 31* 28  --  35*   CR 1.07 1.06 1.23 1.36*   ANIONGAP 9 10  --  8   REY 8.0* 8.6  --  9.5      Recent Labs   Lab Test 01/02/19  0133 03/20/17  1121 03/11/16  1005 06/26/14  1118   ALBUMIN 3.2* 3.9 3.6  --    BILITOTAL 0.5 0.9 0.5  --    ALT 22 23 20  --    AST 15 11 11  --    ALKPHOS 62 69 63  --    PROTEIN  --   --   --  Negative      I reviewed the patient's new imaging results.     Assessment: 83-year-old male presenting with painless hematochezia.  His hemoglobin is slightly down from his baseline likely representing some acute blood loss anemia.  The differential includes diverticular bleed or hemorrhoidal bleed.  Ischemic colitis seems less likely given the lack of abdominal pain.  A large polyp or malignancy is also within the differential but seems less likely.  I doubt an upper GI source for his bright red blood per rectum however he has been taking a fair amount of NSAIDs recently so this may need to be considered if the colonoscopy is unremarkable and the bleeding    Plan:   -Clear liquids, npo at midnight  -Colonoscopy tomorrow with Dr Rod  -Continue to follow hgb and stool output  -Transfusions per IM  -Hold NSAIDs and ASA  -GI following    I discussed the patient's findings and plan with Dr. Perez Rod who will also independently see and examine the patient.     Michael Brock PA-C  [facility]  Cell:  [phone] Monday through Friday 5740-2319  Office: [phone]

## 2019-01-02 NOTE — PLAN OF CARE
Pt A&O x4. VSS on RA. Tele NSR. No pain/nausea. NPO ex meds. Passing gas. 1 bright red BM w/ clots this AM. Hgb 10.1- asymptomatic. Dry rash on bilateral feet. Up SBA. Plan for colon prep this evening and colonoscopy tomorrow.

## 2019-01-02 NOTE — PLAN OF CARE
Pt arrived to unit around 0430. A&Ox4. VSS on room air. Tele: NSR. Denies pain. NPO. Denies nausea. Bloody stools x1 overnight. Active BS, nondistended, soft abdomen. Voids in urinal. Rash on tops of bilateral feet. SBA. PIV infusing NS @ 100mL/hr. Rested well overnight. Hgb 11.3.

## 2019-01-02 NOTE — PROGRESS NOTES
Hospitalist update note    Patient admitted earlier this morning by Dr. Chambers for diverticular bleed. Had 1 bloody stool upon admission, none since. Hgb stable. GI recs reviewed.    82 year old male with hx of HTN and prostate cancer s/p prostatectomy who was admitted with ongoing rectal bleeding    1. Acute lower GI bleed, likely due to diverticulosis  2. Acute blood loss anemia  3. Essential hypertension    - Colonoscopy tomorrow, 1/3  - Hgb 10.1 (stable), baseline 14  - Hold PTA aspirin, NSAIDs  - Continues on PTA amlodipine  - GI following, appreciate assistance    BERNIE Stephens MD  Hospitalist  [phone]

## 2019-01-02 NOTE — PROGRESS NOTES
RECEIVING UNIT ED HANDOFF REVIEW    ED Nurse Handoff Report was reviewed by: Kamryn Ying on January 2, 2019 at 3:57 AM

## 2019-01-02 NOTE — PROGRESS NOTES
SPIRITUAL HEALTH SERVICES Progress Note  [facility]    Visit with pt, per his request.  Pt is a friend of [employer] and asked me to invite him to visit.  Eucharistic ministers will also provide daily communion.  Pt and I talked about his involvement with [employer] and other volunteer work in his life.  Pt stated no other needs at this time, but SH team is available per additional need or request.                                                                                                                                                 Sally Pan M.A.  Staff   Pager [phone]  Phone [phone]

## 2019-01-02 NOTE — ED NOTES
"[facility]  ED Nurse Handoff Report    ED Chief complaint: Rectal Bleeding (Rectal bleeding tonight.)      ED Diagnosis:   Final diagnoses:   Bright red blood per rectum   Gastrointestinal hemorrhage, unspecified gastrointestinal hemorrhage type       Code Status: Full Code- Hospitalist to discuss    Allergies:   Allergies   Allergen Reactions     Cat Hair [Cats]      Pneumococcal Vaccine Rash       Activity level - Baseline/Home:  Independent    Activity Level - Current:   Independent     Needed?: No    Isolation: No  Infection: Not Applicable  Bariatric?: No    Vital Signs:   Vitals:    01/02/19 0118 01/02/19 0130   BP: (!) 163/100 131/84   Resp: 18 16   Temp: 97.7  F (36.5  C)    TempSrc: Oral    SpO2: 98% 95%   Weight: 75.8 kg (167 lb)    Height: 1.778 m (5' 10\")        Cardiac Rhythm: ,        Pain level:      Is this patient confused?: No   Does this patient have a guardian?  No         If yes, is there guardianship documents in the Epic \"Code/ACP\" activity?  N/A         Guardian Notified?  N/A  [place] - Suicide Severity Rating Scale Completed?  Yes  If yes, what color did the patient score?  White    Patient Report: Initial Complaint: Patient presents with bloody stool. The patient states that last evening he first noticed the bright red blood in his stools. The patient also notes diarrhea and gas since that time about every hour. The patient notes 6 or 7 episodes. The patient denies any abdominal pain, fever, or vomiting. The patient states he has had blood in his stools once before in July of 2011 for which he was diagnosised with diverticulosis with stool in the sample. He states the bleeding at that time was less severe than today. The patient states that he spent 10 days over [place] in [place] and returned on Dec. 29th.      Focused Assessment: Cards: WDL  Resp: WDL  Nu[place]: WDL  GI: ex frequent bloody stools  Tests Performed: labs, ekg and occult stool  Abnormal Results: "   Results for orders placed or performed during the hospital encounter of 01/02/19   CBC with platelets differential   Result Value Ref Range    WBC 5.4 4.0 - 11.0 10e9/L    RBC Count 3.51 (L) 4.4 - 5.9 10e12/L    Hemoglobin 11.3 (L) 13.3 - 17.7 g/dL    Hematocrit 34.1 (L) 40.0 - 53.0 %    MCV 97 78 - 100 fl    MCH 32.2 26.5 - 33.0 pg    MCHC 33.1 31.5 - 36.5 g/dL    RDW 13.2 10.0 - 15.0 %    Platelet Count 218 150 - 450 10e9/L    Diff Method Automated Method     % Neutrophils 46.5 %    % Lymphocytes 32.7 %    % Monocytes 10.2 %    % Eosinophils 9.5 %    % Basophils 0.9 %    % Immature Granulocytes 0.2 %    Nucleated RBCs 0 0 /100    Absolute Neutrophil 2.5 1.6 - 8.3 10e9/L    Absolute Lymphocytes 1.8 0.8 - 5.3 10e9/L    Absolute Monocytes 0.6 0.0 - 1.3 10e9/L    Absolute Eosinophils 0.5 0.0 - 0.7 10e9/L    Absolute Basophils 0.1 0.0 - 0.2 10e9/L    Abs Immature Granulocytes 0.0 0 - 0.4 10e9/L    Absolute Nucleated RBC 0.0    Comprehensive metabolic panel   Result Value Ref Range    Sodium 143 133 - 144 mmol/L    Potassium 3.9 3.4 - 5.3 mmol/L    Chloride 112 (H) 94 - 109 mmol/L    Carbon Dioxide 22 20 - 32 mmol/L    Anion Gap 9 3 - 14 mmol/L    Glucose 113 (H) 70 - 99 mg/dL    Urea Nitrogen 31 (H) 7 - 30 mg/dL    Creatinine 1.07 0.66 - 1.25 mg/dL    GFR Estimate 64 >60 mL/min/[1.73_m2]    GFR Estimate If Black 74 >60 mL/min/[1.73_m2]    Calcium 8.0 (L) 8.5 - 10.1 mg/dL    Bilirubin Total 0.5 0.2 - 1.3 mg/dL    Albumin 3.2 (L) 3.4 - 5.0 g/dL    Protein Total 6.4 (L) 6.8 - 8.8 g/dL    Alkaline Phosphatase 62 40 - 150 U/L    ALT 22 0 - 70 U/L    AST 15 0 - 45 U/L   INR   Result Value Ref Range    INR 1.06 0.86 - 1.14   Lactic acid whole blood   Result Value Ref Range    Lactic Acid 0.6 (L) 0.7 - 2.0 mmol/L   Occult blood stool (ED Lab POCT Only 3-11)   Result Value Ref Range    Occult Blood Positive (A) NEG^Negative   EKG 12-lead, tracing only   Result Value Ref Range    Interpretation ECG Click View Image link to  view waveform and result        Treatments provided: protonix    Family Comments: spouse bedside    OBS brochure/video discussed/provided to patient/family: N/A              Name of person given brochure if not patient: na              Relationship to patient: na    ED Medications: Medications - No data to display    Drips infusing?:  No    For the majority of the shift this patient was Green.   Interventions performed were na.    Severe Sepsis OR Septic Shock Diagnosis Present: No    To be done/followed up on inpatient unit:  [facility]on    ED NURSE PHONE NUMBER: [phone]

## 2019-01-02 NOTE — PHARMACY-ADMISSION MEDICATION HISTORY
Admission medication history interview status for the 1/2/2019  admission is complete. See EPIC admission navigator for prior to admission medications     Medication history source reliability:Good    Actions taken by pharmacist (provider contacted, etc): Interviewed patient.      Additional medication history information not noted on PTA med list :None    Medication reconciliation/reorder completed by provider prior to medication history? Yes    Time spent in this activity: 20 minutes    Prior to Admission medications    Medication Sig Last Dose Taking? Auth Provider   amLODIPine (NORVASC) 5 MG tablet Take 1 tablet (5 mg) by mouth daily 1/1/2019 at AM Yes Hosea Lockett MD   ascorbic acid 1000 MG TABS tablet Take 1,000 mg by mouth 2 times daily 1/1/2019 at AM Yes Unknown, Entered By History   aspirin (ASA) 325 MG EC tablet Take 325 mg by mouth daily 12/31/2018 at AM Yes Unknown, Entered By History   Cholecalciferol (VITAMIN D PO) Take 2,500 Units by mouth daily 2300 IU daily 1/1/2019 at AM Yes Reported, Patient   GARLIC 600 MG OR CAPS 600mg orally daily. 1/1/2019 at AM Yes Reported, Patient   guaiFENesin (MUCINEX) 600 MG 12 hr tablet Take 600 mg by mouth 2 times daily Past Week at Unknown time Yes Unknown, Entered By History   ibuprofen (ADVIL/MOTRIN) 200 MG tablet Take 400 mg by mouth 3 times daily as needed for mild pain Past Week at Unknown time Yes Unknown, Entered By History   Magnesium 400 MG CAPS Take 1 capsule by mouth every evening Magnesium Glycinate 12/31/2018 at PM Yes Reported, Patient   Multiple vitamin  s TABS Take 1 tablet by mouth daily. 1/1/2019 at AM Yes Reported, Patient   Omega-3 Fatty Acids (SALMON OIL-1000 PO) Take 1 capsule by mouth 2 times daily. 1/1/2019 at AM Yes Reported, Patient   OS-REY ULTRA 600 MG OR TABS 1 TABLET TWICE DAILY 1/1/2019 at AM Yes Reported, Patient   OSTEO BI-FLEX ADV TRIPLE ST OR TABS 1 po bid 1/1/2019 at AM Yes Reported, Patient   triamcinolone (KENALOG) 0.1 %  external cream Apply topically daily as needed for irritation (Feet.) Past Week at Unknown time Yes Unknown, Entered By History       Brenda Rodríguez, PharmD, BCPS

## 2019-01-02 NOTE — ED PROVIDER NOTES
History     Chief Complaint:  Rectal Bleeding     HPI   Jose Ray is a 83 year old male who presents with rectal bleeding. The patient states he has been having blood in his stools. The patient states that around 1730 last night he first noticed the bright red blood in his stools. The patient also notes diarrhea and gas since that time about every hour. The patient notes 6 or 7 episodes. The patient denies any abdominal pain, fever, or vomiting. The patient states he has had blood in his stools once before in July of 2011 for which he was diagnosed with diverticulosis on colonoscopy. He states the bleeding at that time was less severe than today. The patient states that he spent 10 days over [place] in [place].     Allergies:  Cat hair  pneumococcal vaccine      Medications:    Amlodipine  Cholecalciferol    Past Medical History:    Complex renal cyst   Eczema   Gallstone   HTN (hypertension)   Hx of colonoscopy   Hydronephrosis of left kidney   Nephrolithiasis   Prostate cancer    Skin cancer, basal cell   Thrombophlebitis     Past Surgical History:    Herniorrhaphy  Vein stripping  Circumcision  Basal cell   TURP    Family History:    Mother: hypertension, arthritis, gastrointestinal disease  Father: lung cancer  Paternal grandmother: CAD  Maternal grandmother: heart disease    Social History:   The patient was accompanied to the ED by wife.  Smoking Status: Never Smoker  Smokeless Tobacco: Never Used  Alcohol Use: Positive  Marital Status:       Review of Systems   Constitutional: Negative for fever.   Gastrointestinal: Positive for blood in stool and diarrhea. Negative for abdominal pain and vomiting.   All other systems reviewed and are negative.      Physical Exam     Patient Vitals for the past 24 hrs:   BP Temp Temp src Pulse Heart Rate Resp SpO2 Height Weight   01/02/19 0416 (!) 168/91 96.3  F (35.7  C) Oral -- 80 16 95 % -- --   01/02/19 0319 (!) 178/97 -- -- 77 -- 14 95 % -- --  "  01/02/19 0300 -- -- -- -- -- -- 95 % -- --   01/02/19 0130 131/84 -- -- -- 64 16 95 % -- --   01/02/19 0118 (!) 163/100 97.7  F (36.5  C) Oral -- 83 18 98 % 1.778 m (5' 10\") 75.8 kg (167 lb)     Physical Exam  VS: Reviewed per above  HENT: Mucous membranes moist  EYES: sclera anicteric  CV: Rate as noted, regular rhythm.   RESP: Effort normal. Breath sounds are normal bilaterally.  GI: no tenderness, not distended.  NEURO: Alert, moving all extremities  MSK: No deformity of the extremities  SKIN: Warm and dry    Emergency Department Course     ECG:  ECG taken at 0307, ECG read at 0310  Sinus rhythm with premature atrial complexes  Otherwise normal ECG  Rate 70 bpm. KS interval 204 ms. QRS duration 84 ms. QT/QTc 412/444 ms. P-R-T axes 92 -11 32.  No significant change compared to ECG dated 9/30/2009.    Laboratory:  Laboratory findings were communicated with the patient who voiced understanding of the findings.    CBC: WBC 5.4, HGB 11.3(L),   CMP: chloride 112(H), glucose 113(H), BUN 31(H), calcium 8.0(L), albumin 3.2(L), protein total 6.4(L) o/w WNL (Creatinine 1.07)  Lactic acid whole blood: 0.6(L)  INR: 1.06    Occult blood stool: positive (A)    Interventions:  0333 Protonix 40 mg IV    Emergency Department Course:    0133 IV was inserted and blood was drawn for laboratory testing, results above.    0145 Nursing notes and vitals reviewed.    0200 I performed an exam of the patient as documented above.     0241 A rectal exam was preformed and stool was obtained, results above.     0258 Patient charts were reviewed. The patient had a hemoglobin of 14.1 in May of 2018.     0315  I spoke with Dr. Chambers of the Hospitalist service from [facility] regarding patient's presentation, findings, and plan of care.    0330 I personally reviewed the laboratory results with the patient and answered all related questions prior to admittance.    Impression & Plan      Medical Decision Making:  Jose Ray " is a 83 year old male who presents to the emergency department today for evaluation of bright red blood per rectum.  Initial vital signs are without marked derangement.  Exam is notable for lack of abdominal tenderness.  A broad differential was considered including ischemic/infectious colitis, diverticulosis, internal versus external hemorrhoids, aortoenteric fistula, upper GI bleed such as peptic ulcer disease/gastritis, others.  Rectal exam was notable for bright red blood.  Given lack of abdominal pain and normal lactic acid, I did not pursue CT imaging of the abdomen given a lower suspicion for significant infectious or ischemic colitis.  Given the severity of the bleeding by history as well as proximally 2.5 drop in hemoglobin, patient will be admitted to the hospital for further hemoglobin trending and possible GI consultation.  He was given a dose of Protonix prior to admission in the more unlikely event of upper GI bleed.    Diagnosis:    ICD-10-CM    1. Bright red blood per rectum K62.5 Comprehensive metabolic panel     INR     Lactic acid whole blood   2. Gastrointestinal hemorrhage, unspecified gastrointestinal hemorrhage type K92.2         Disposition:   The patient is admitted into the care of Dr. Chambers.    Scribe Disclosure:  INayely, am serving as a scribe at 1:42 AM on 1/2/2019 to document services personally performed by Juventino Lucero MD based on my observations and the provider's statements to me.   EMERGENCY DEPARTMENT       Juventino Lucero MD  01/02/19 0655

## 2019-01-02 NOTE — H&P
History and Physical     Jose Ray MRN# [id number]   YOB: 1935 Age: 83 year old      Date of Admission:  1/2/2019    Primary care provider: Hosea Lockett          Assessment and Plan:   This is a  83 year old male admitted with suspected diverticular bleed.    1. Diverticular bleed.  Doubt infectious etiology.  Will check serial hemoglobins every 6 hours overnight.  Will consult Gastroenterology in the morning to evaluate for possible endoscopy.  I doubt this is an upper GI bleed, but given recent NSAID use, will start IV Protonix BID.  Hold aspirin.    2. Acute blood loss anemia.  Transfuse for hemoglobin less than 7.    3. Hypertension.  Patient is hypertensive in ED.  Will consult amlodipine with hold parameters.    4. Code status.  Patient is full code.          Chief Complaint:   This patient is a 83 year old male who presents with hematochezia.    History is obtained from the patient and his wife.         History of Present Illness:   This is an 83-year-old male with history of previous diverticular bleed in 2011.  4 days prior to admission patient returned following a trip to [facility] and [place] with his wife, daughter, and 2 grandsons.  Patient states he been doing quite a bit of walking and was taking ibuprofen 400 mg twice daily for the last 10 days due to hip pain.  Patient reports that today he had 7-8 episodes of bright red blood per rectum.  He had no associated abdominal pain or cramping.  He denies fevers or chills.  Patient denies nausea or vomiting.  He denies recent antibiotic use.  Patient states that no one else in his party have reported vomiting, diarrhea, or GI bleeding.  Patient is not on any other blood thinners.  He denies lightheadedness, dizziness, syncope, or falls.  He denies nasal congestion, runny nose, sore throat, cough, wheezing, or shortness of breath.  Patient denies chest pain or pressure.  Patient reports symmetrical bilateral lower extremity  swelling that he attributes to increased walking in [place].  Patient denies focal numbness or weakness.             Past Medical History:     Past Medical History:   Diagnosis Date     Complex renal cyst 06/2014    seen on us done for htn, fu 6 months, fu done 8/15 and stable; fu 6/18 simple cyst, a bit larger     Eczema      Gallstone 2010    seen on ct for renal stone     HTN (hypertension) 6/14    us done and no hydro but renal cyst     Hx of colonoscopy 2011    nl     Hydronephrosis of left kidney 2010    seen on above ct     Nephrolithiasis 2010    seen on ct, hydronephrosis seen as well     Prostate cancer (H) 2008    had surgery, D.r Michelle     Skin cancer, basal cell 2002, 2008    had xrt 2002, mohs surgery 2008     Thrombophlebitis 1985             Past Surgical History:     Past Surgical History:   Procedure Laterality Date     C NONSPECIFIC PROCEDURE  14, 36    HERNIORRHAPHY     C NONSPECIFIC PROCEDURE  1985    VEIN STRIPPING     CIRCUMCISION  3/12/2013    Procedure: CIRCUMCISION;  CIRCUMCISION;  Surgeon: Stevenson Martinez MD;  [facility]: [facility]     mohs surgery  2008, 2011    basal cell     TURP  2008    prostate cancer             Social History:     Social History     Tobacco Use     Smoking status: Never Smoker     Smokeless tobacco: Never Used   Substance Use Topics     Alcohol use: Yes     Alcohol/week: 0.0 oz     Comment: 0-2 drinks per week             Family History:   Family history reviewed and is noncontributory other than noted in HPI          Immunizations:     Immunization History   Administered Date(s) Administered     Influenza (IIV3) PF 11/11/2005, 08/28/2015, 09/20/2016, 10/15/2017     Pneumo Conj 13-V (2010&after) 03/17/2016     Pneumococcal 23 valent 09/01/2001     TD (ADULT, 7+) 12/10/2009     TDAP Vaccine (Adacel) 02/21/2013     Zoster vaccine, live 12/10/2009            Allergies:     Allergies   Allergen Reactions     Cat Hair [Cats]      Pneumococcal Vaccine Rash              Medications:     Prior to Admission medications    Medication Sig Start Date End Date Taking? Authorizing Provider   amLODIPine (NORVASC) 5 MG tablet Take 1 tablet (5 mg) by mouth daily 5/4/18   Hosea Lockett MD   ASPIRIN 325 MG OR TBEC ONE DAILY 2/15/07   Stevenson Chisholm MD   Cholecalciferol (VITAMIN D PO) Take  by mouth. 2300 IU daily    Reported, Patient   GARLIC 600 MG OR CAPS None Entered    Reported, Patient   Magnesium 400 MG CAPS Take 1 capsule by mouth daily.    Reported, Patient   Multiple vitamin  s TABS Take 1 tablet by mouth daily.    Reported, Patient   Omega-3 Fatty Acids (SALMON OIL-1000 PO) Take 1 capsule by mouth 2 times daily.    Reported, Patient   OS-REY ULTRA 600 MG OR TABS 1 TABLET TWICE DAILY    Reported, Patient   OSTEO BI-FLEX ADV TRIPLE ST OR TABS 1 po bid    Reported, Patient   VITAMIN C 1000 MG OR TABS 1 TABLET DAILY    Reported, Patient            Review of Systems:   The 10 point Review of Systems performed and is negative other than noted in the HPI           Physical Exam:   Vitals were reviewed  Temp: 97.7  F (36.5  C) Temp src: Oral BP: (!) 178/97 Pulse: 77 Heart Rate: 64 Resp: 14 SpO2: 95 % O2 Device: None (Room air)      This is a well nourished well developed male resting comfortably in bed, no acute distress  Head: atraumatic normocephalic, sclera noninjected and anicterric, oral mucosa moist without lesions or exudates.  Neck: supple without spinal abnormality  Chest: clear to auscultation bilaterally, without wheezes, rhonchi, or rales.  Cardiovascular: regular rate and rhythm, no murmurs, gallops, or rubs, no edema  Abdomen: bowel sounds normal, nontender and nondistended, no hepatosplenomegaly or masses.  Musculoskeletal: normal muscle mass and tone  Skin: no rashes  Lymph: no lymphadenopathy.  Neuro: cranial nerves II-XII intact, strength in all four extremities normal, reflexes normal, coordination normal.         Data:   Data reviewed today: I reviewed all  medications, new labs and imaging results over the last 24 hours. I personally reviewed no images or EKG's today.    Recent Labs   Lab 01/02/19  0133   WBC 5.4   HGB 11.3*   MCV 97      INR 1.06      POTASSIUM 3.9   CHLORIDE 112*   CO2 22   BUN 31*   CR 1.07   ANIONGAP 9   REY 8.0*   *   ALBUMIN 3.2*   PROTTOTAL 6.4*   BILITOTAL 0.5   ALKPHOS 62   ALT 22   AST 15

## 2019-01-03 VITALS
OXYGEN SATURATION: 98 % | DIASTOLIC BLOOD PRESSURE: 85 MMHG | HEART RATE: 68 BPM | HEIGHT: 70 IN | RESPIRATION RATE: 22 BRPM | WEIGHT: 167 LBS | TEMPERATURE: 97.3 F | SYSTOLIC BLOOD PRESSURE: 136 MMHG | BODY MASS INDEX: 23.91 KG/M2

## 2019-01-03 LAB
ANION GAP SERPL CALCULATED.3IONS-SCNC: 7 MMOL/L (ref 3–14)
BASOPHILS # BLD AUTO: 0 10E9/L (ref 0–0.2)
BASOPHILS NFR BLD AUTO: 0.5 %
BUN SERPL-MCNC: 13 MG/DL (ref 7–30)
CALCIUM SERPL-MCNC: 7.7 MG/DL (ref 8.5–10.1)
CHLORIDE SERPL-SCNC: 113 MMOL/L (ref 94–109)
CO2 SERPL-SCNC: 24 MMOL/L (ref 20–32)
COLONOSCOPY: NORMAL
CREAT SERPL-MCNC: 0.94 MG/DL (ref 0.66–1.25)
DIFFERENTIAL METHOD BLD: ABNORMAL
EOSINOPHIL # BLD AUTO: 0.4 10E9/L (ref 0–0.7)
EOSINOPHIL NFR BLD AUTO: 7.5 %
ERYTHROCYTE [DISTWIDTH] IN BLOOD BY AUTOMATED COUNT: 13 % (ref 10–15)
GFR SERPL CREATININE-BSD FRML MDRD: 74 ML/MIN/{1.73_M2}
GLUCOSE SERPL-MCNC: 90 MG/DL (ref 70–99)
HCT VFR BLD AUTO: 30.4 % (ref 40–53)
HGB BLD-MCNC: 10.1 G/DL (ref 13.3–17.7)
HGB BLD-MCNC: 10.3 G/DL (ref 13.3–17.7)
HGB BLD-MCNC: 9.7 G/DL (ref 13.3–17.7)
IMM GRANULOCYTES # BLD: 0 10E9/L (ref 0–0.4)
IMM GRANULOCYTES NFR BLD: 0.2 %
LYMPHOCYTES # BLD AUTO: 1.6 10E9/L (ref 0.8–5.3)
LYMPHOCYTES NFR BLD AUTO: 28.3 %
MCH RBC QN AUTO: 32 PG (ref 26.5–33)
MCHC RBC AUTO-ENTMCNC: 33.2 G/DL (ref 31.5–36.5)
MCV RBC AUTO: 96 FL (ref 78–100)
MONOCYTES # BLD AUTO: 0.6 10E9/L (ref 0–1.3)
MONOCYTES NFR BLD AUTO: 10.1 %
NEUTROPHILS # BLD AUTO: 2.9 10E9/L (ref 1.6–8.3)
NEUTROPHILS NFR BLD AUTO: 53.4 %
NRBC # BLD AUTO: 0 10*3/UL
NRBC BLD AUTO-RTO: 0 /100
PLATELET # BLD AUTO: 211 10E9/L (ref 150–450)
POTASSIUM SERPL-SCNC: 3.9 MMOL/L (ref 3.4–5.3)
RBC # BLD AUTO: 3.16 10E12/L (ref 4.4–5.9)
SODIUM SERPL-SCNC: 144 MMOL/L (ref 133–144)
WBC # BLD AUTO: 5.5 10E9/L (ref 4–11)

## 2019-01-03 PROCEDURE — 25000128 H RX IP 250 OP 636: Performed by: INTERNAL MEDICINE

## 2019-01-03 PROCEDURE — C9113 INJ PANTOPRAZOLE SODIUM, VIA: HCPCS | Performed by: INTERNAL MEDICINE

## 2019-01-03 PROCEDURE — 80048 BASIC METABOLIC PNL TOTAL CA: CPT | Performed by: INTERNAL MEDICINE

## 2019-01-03 PROCEDURE — 36415 COLL VENOUS BLD VENIPUNCTURE: CPT | Performed by: INTERNAL MEDICINE

## 2019-01-03 PROCEDURE — 88305 TISSUE EXAM BY PATHOLOGIST: CPT | Performed by: INTERNAL MEDICINE

## 2019-01-03 PROCEDURE — G0500 MOD SEDAT ENDO SERVICE >5YRS: HCPCS | Performed by: INTERNAL MEDICINE

## 2019-01-03 PROCEDURE — 88305 TISSUE EXAM BY PATHOLOGIST: CPT | Mod: 26 | Performed by: INTERNAL MEDICINE

## 2019-01-03 PROCEDURE — 85025 COMPLETE CBC W/AUTO DIFF WBC: CPT | Performed by: INTERNAL MEDICINE

## 2019-01-03 PROCEDURE — 45385 COLONOSCOPY W/LESION REMOVAL: CPT | Performed by: INTERNAL MEDICINE

## 2019-01-03 PROCEDURE — 25000132 ZZH RX MED GY IP 250 OP 250 PS 637: Performed by: PHYSICIAN ASSISTANT

## 2019-01-03 PROCEDURE — 0DBP8ZZ EXCISION OF RECTUM, VIA NATURAL OR ARTIFICIAL OPENING ENDOSCOPIC: ICD-10-PCS | Performed by: INTERNAL MEDICINE

## 2019-01-03 PROCEDURE — 85018 HEMOGLOBIN: CPT | Performed by: INTERNAL MEDICINE

## 2019-01-03 PROCEDURE — 25000132 ZZH RX MED GY IP 250 OP 250 PS 637: Performed by: INTERNAL MEDICINE

## 2019-01-03 PROCEDURE — 99239 HOSP IP/OBS DSCHRG MGMT >30: CPT | Performed by: INTERNAL MEDICINE

## 2019-01-03 RX ORDER — LIDOCAINE 40 MG/G
CREAM TOPICAL
Status: CANCELLED | OUTPATIENT
Start: 2019-01-03

## 2019-01-03 RX ORDER — LIDOCAINE 40 MG/G
CREAM TOPICAL
Status: DISCONTINUED | OUTPATIENT
Start: 2019-01-03 | End: 2019-01-03 | Stop reason: HOSPADM

## 2019-01-03 RX ORDER — FENTANYL CITRATE 50 UG/ML
INJECTION, SOLUTION INTRAMUSCULAR; INTRAVENOUS PRN
Status: DISCONTINUED | OUTPATIENT
Start: 2019-01-03 | End: 2019-01-03 | Stop reason: HOSPADM

## 2019-01-03 RX ORDER — NALOXONE HYDROCHLORIDE 0.4 MG/ML
.1-.4 INJECTION, SOLUTION INTRAMUSCULAR; INTRAVENOUS; SUBCUTANEOUS
Status: CANCELLED | OUTPATIENT
Start: 2019-01-03 | End: 2019-01-04

## 2019-01-03 RX ORDER — FLUMAZENIL 0.1 MG/ML
0.2 INJECTION, SOLUTION INTRAVENOUS
Status: CANCELLED | OUTPATIENT
Start: 2019-01-03 | End: 2019-01-04

## 2019-01-03 RX ADMIN — AMLODIPINE BESYLATE 5 MG: 5 TABLET ORAL at 08:14

## 2019-01-03 RX ADMIN — PANTOPRAZOLE SODIUM 40 MG: 40 INJECTION, POWDER, FOR SOLUTION INTRAVENOUS at 03:13

## 2019-01-03 RX ADMIN — MAGESIUM CITRATE 296 ML: 1.75 LIQUID ORAL at 05:57

## 2019-01-03 RX ADMIN — Medication 1 LOZENGE: at 09:09

## 2019-01-03 RX ADMIN — SODIUM CHLORIDE: 9 INJECTION, SOLUTION INTRAVENOUS at 08:11

## 2019-01-03 ASSESSMENT — ACTIVITIES OF DAILY LIVING (ADL)
ADLS_ACUITY_SCORE: 10

## 2019-01-03 NOTE — PLAN OF CARE
A&Ox4. VSS on RA. Tele: NSR. Denies pain. Rash on bilateral tops feet. NPO. Denies nausea. Stools yellow, watery; given mag citrate this AM. No bleeding noted. PIV infusing. Hgb checks q 6 hr: last 10.3. Independent. Plan: colonoscopy this AM

## 2019-01-03 NOTE — PLAN OF CARE
A/ox4. VSS on RA. Denied pain. Tele NSR, discontinued this afternoon. 1 watery, yellow stool this shift, no blood noted. Hgb checks q6h, 0600 - 10.1 and 1200 check at 9.7. Patient completed colonoscopy this afternoon, multiple diverticula found - MD aware. Patient safe to discharge. AVS packet reviewed with patient and wife at bedside. Patient to stop taking ibuprofen and ASA until seen by primary MD. PIV removed. Personal belongings accounted for and sent home with patient. Patient ambulated to door 6 by escort services. Patient discharged in stable condition.

## 2019-01-03 NOTE — PLAN OF CARE
A&Ox4. VSS on RA.  NPO. Bowel prep done this evening, some bright red stools pt reports they have become more clear. Colonoscopy tomorrow. C/o of sore throat, using lozenges. PIV infusing NS @ 100 ml/hr. Will continue to monitor.

## 2019-01-03 NOTE — DISCHARGE SUMMARY
[facility]    Discharge Summary  Hospitalist    Date of Admission:  1/2/2019  Date of Discharge:  1/3/2019  3:13 PM  Discharging Provider: Cindy Stephens  Date of Service (when I saw the patient): 01/03/19    Discharge Diagnoses     1. Acute lower GI bleed with acute blood loss anemia due to diverticulosis  2. Essential hypertension  3. Osteoarthritis  4. History of prostate cancer s/p prostatectomy    History of Present Illness   Please see H&P by Dr. Chambers on 1/2/2018    Hospital Course   82 year old male with hx of HTN and prostate cancer s/p prostatectomy who presented on 1/2/2018 with ongoing rectal bleeding. Hgb 9.7 from baseline 14. His bleeding resolved spontaneously during his hospital stay. He underwent a colonoscopy by GI (1/3) which showed diverticulosis in the sigmoid and descending colon, a 5 mm rectal polyp which was resected, and both internal and external hemorrhoids.     - He has been advised to hold his home aspirin and NSAIDs until cleared by his PCP  - Recommend Hgb at next follow up visit  - He not overtly symptomatic despite 5 point drop in Hgb. He was advised to notify his PCP for potential iron supplementation if he were to become symptomatic.     # Discharge Pain Plan  - Patient currently has NO PAIN and is not being prescribed pain medications on discharge.    Cindy Stephens    Significant Results and Procedures   As noted above    Pending Results   None    Code Status   Full Code       Primary Care Physician   Hosea Lockett    Physical Exam   Temp: 97.3  F (36.3  C) Temp src: Oral BP: 136/85 Pulse: 68 Heart Rate: 69 Resp: 22 SpO2: 98 % O2 Device: Nasal cannula Oxygen Delivery: 3 LPM  Vitals:    01/02/19 0118   Weight: 75.8 kg (167 lb)     Vital Signs with Ranges  Temp:  [96  F (35.6  C)-97.3  F (36.3  C)] 97.3  F (36.3  C)  Pulse:  [65-77] 68  Heart Rate:  [64-75] 69  Resp:  [9-31] 22  BP: (123-150)/() 136/85  SpO2:  [91 %-99 %] 98 %  I/O last 3 completed  shifts:  In: 0439 [I.V.:6169]  Out: -     Constitutional: Appears comfortable, NAD  Respiratory: Breathing non-labored. Lungs CTAB - no wheezes/crackles/rhonchi  Cardiovascular: Heart RRR, no m/r/g. No pedal edema.   GI: +BS. Abd soft/NT  Skin: Warm and dry. No rash.  Musculoskeletal: Normal muscle bulk and tone  Neurologic: Alert and appropriate. MOSES  Psychiatric: Calm and cooperative    Discharge Disposition   Discharged to home  Condition at discharge: Satisfactory    Consultations This Hospital Stay   GASTROENTEROLOGY IP CONSULT    Time Spent on this Encounter   ICindy, personally saw the patient today and spent 35 minutes discharging this patient.    Discharge Orders      Reason for your hospital stay    Rectal bleeding which has resolved. We recommend that you discontinue aspirin and ibuprofen     Follow-up and recommended labs and tests     Follow up with primary care provider, Hosea Lockett, within 2 weeks for hospital follow- up.  The following labs/tests are recommended: Hemoglobin.     Activity    Your activity upon discharge: activity as tolerated     Diet    Follow this diet upon discharge: Regular diet     Discharge Medications   Discharge Medication List as of 1/3/2019  2:52 PM      CONTINUE these medications which have NOT CHANGED    Details   amLODIPine (NORVASC) 5 MG tablet Take 1 tablet (5 mg) by mouth daily, Disp-90 tablet, R-3, E-Prescribe      ascorbic acid 1000 MG TABS tablet Take 1,000 mg by mouth 2 times daily, Historical      Cholecalciferol (VITAMIN D PO) Take 2,500 Units by mouth daily 2300 IU daily, Historical      GARLIC 600 MG OR CAPS 600mg orally daily., Historical      guaiFENesin (MUCINEX) 600 MG 12 hr tablet Take 600 mg by mouth 2 times daily, Historical      Magnesium 400 MG CAPS Take 1 capsule by mouth every evening Magnesium Glycinate, Historical      Multiple vitamin  s TABS Take 1 tablet by mouth daily., Historical      Omega-3 Fatty Acids (SALMON OIL-1000 PO)  Take 1 capsule by mouth 2 times daily., Historical      OS-REY ULTRA 600 MG OR TABS 1 TABLET TWICE DAILY, Historical      OSTEO BI-FLEX ADV TRIPLE ST OR TABS 1 po bid, Historical      triamcinolone (KENALOG) 0.1 % external cream Apply topically daily as needed for irritation (Feet.)Historical         STOP taking these medications       aspirin (ASA) 325 MG EC tablet Comments:   Reason for Stopping:         ibuprofen (ADVIL/MOTRIN) 200 MG tablet Comments:   Reason for Stopping:             Allergies   Allergies   Allergen Reactions     Cat Hair [Cats]      Pneumococcal Vaccine Rash     Data   Most Recent 3 CBC's:  Recent Labs   Lab Test 01/03/19  1228 01/03/19  0620 01/03/19  0030  01/02/19  0133 05/04/18  1448   WBC  --  5.5  --   --  5.4 6.1   HGB 9.7* 10.1* 10.3*   < > 11.3* 14.1   MCV  --  96  --   --  97 98   PLT  --  211  --   --  218 267    < > = values in this interval not displayed.      Most Recent 3 BMP's:  Recent Labs   Lab Test 01/03/19  0620 01/02/19  0133 05/04/18  1448    143 142   POTASSIUM 3.9 3.9 4.2   CHLORIDE 113* 112* 110*   CO2 24 22 22   BUN 13 31* 28   CR 0.94 1.07 1.06   ANIONGAP 7 9 10   REY 7.7* 8.0* 8.6   GLC 90 113* 92     Most Recent 2 LFT's:  Recent Labs   Lab Test 01/02/19 0133 03/20/17  1121   AST 15 11   ALT 22 23   ALKPHOS 62 69   BILITOTAL 0.5 0.9     Most Recent INR's and Anticoagulation Dosing History:  Anticoagulation Dose History     Recent Dosing and Labs Latest Ref Rng & Units 1/2/2019    INR 0.86 - 1.14 1.06        Most Recent 3 Troponin's:No lab results found.  Most Recent Cholesterol Panel:  Recent Labs   Lab Test 05/04/18  1448   CHOL 195   LDL 69      TRIG 97     Most Recent 6 Bacteria Isolates From Any Culture (See EPIC Reports for Culture Details):No lab results found.  Most Recent TSH, T4 and A1c Labs:  Recent Labs   Lab Test 06/26/14  1115   TSH 0.86     Results for orders placed or performed during the hospital encounter of 06/08/18   US Renal  Complete    Narrative    ULTRASOUND  RENAL COMPLETE   6/8/2018 12:54 PM     HISTORY: Follow-up left renal cyst.    COMPARISON: 8/25/2015.    FINDINGS:     Right kidney measures 11 x 6.6 x 5 cm. Cortical thickness measures 1.2  cm AP. There is no hydronephrosis. No renal calculi or solid renal  masses are evident.     Left kidney measures 9.8 x 5.1 x 4.3 cm. Cortical thickness measures  0.9 cm AP. There is no hydronephrosis. No solid renal masses are  identified. A 0.5 cm echogenic focus in the lower pole of the left  kidney likely represents a nonobstructing stone. There is a 4.9 cm  simple cyst in the left kidney, increased slightly in size.    Limited images of the bladder are unremarkable.       Impression    IMPRESSION:   1. Probable nonobstructing stone in the lower pole of the left kidney  measures 0.5 cm.  2. A 4.9 cm simple cyst in the left kidney has increased slightly in  size since 8/25/2015.     KHARI CODY MD

## 2019-01-04 ENCOUNTER — TELEPHONE (OUTPATIENT)
Dept: FAMILY MEDICINE | Facility: CLINIC | Age: 84
End: 2019-01-04

## 2019-01-04 NOTE — TELEPHONE ENCOUNTER
"ED/Discharge Protocol    \"Hi, my name is Lizeth Yaakovbruce, a registered nurse, and I am calling on behalf of Dr. Lockett's office at [place].  I am calling to follow up and see how things are going for you after your recent visit.\"    \"I see that you were in the (ER/UC/IP) on 1/2/2019-1/3/2019.    How are you doing now that you are home?\" patient states he's doing ok - no further rectal bleeding at this time    Is patient experiencing symptoms that may require a hospital visit?  No    Discharge Instructions    \"Let's review your discharge instructions.  What is/are the follow-up recommendations?  Pt. Response: F/U with PCP    \"Were you instructed to make a follow-up appointment?\"  Pt. Response: No.       \"When you see the provider, I would recommend that you bring your discharge instructions with you.    Medications    \"How many new medications are you on since your hospitalization/ED visit?\"    0-1  \"How many of your current medicines changed (dose, timing, name, etc.) while you were in the hospital/ED visit?\"   0-1  \"Do you have questions about your medications?\"   No  \"Were you newly diagnosed with heart failure, COPD, diabetes or did you have a heart attack?\"   No  For patients on insulin: \"Did you start on insulin in the hospital or did you have your insulin dose changed?\"   No    Medication reconciliation completed? Yes    Was MTM referral placed (*Make sure to put transitions as reason for referral)?   No    Call Summary    \"Do you have any questions or concerns about your condition or care plan at the moment?\"    No  Triage nurse advice given: keep f/u appt with PCP    Patient was in ER once in the past year (assess appropriateness of ER visits.)      \"If you have questions or things don't continue to improve, we encourage you contact us through the main clinic number,  [phone].  Even if the clinic is not open, triage nurses are available 24/7 to help you.     We would like you to know that our clinic has " "extended hours (provide information).  We also have urgent care (provide details on closest location and hours/contact info)\"      \"Thank you for your time and take care!\"    Lizeth MENDOZA RN    Reason for your hospital stay     Rectal bleeding which has resolved. We recommend that you discontinue aspirin and ibuprofen          Follow-up and recommended labs and tests      Follow up with primary care provider, Hosea Lockett, within 2 weeks for hospital follow- up.  The following labs/tests are recommended: Hemoglobin.          Activity     Your activity upon discharge: activity as tolerated          Diet     Follow this diet upon discharge: Regular diet      Next 5 appointments (look out 90 days)    Jan 07, 2019 11:30 AM CST  Office Visit with SMITHA Fuller CNP  [facility] ([facility] [address] [facility] [id number] [phone]          "

## 2019-01-07 LAB — COPATH REPORT: NORMAL

## 2019-01-08 ENCOUNTER — OFFICE VISIT (OUTPATIENT)
Dept: FAMILY MEDICINE | Facility: CLINIC | Age: 84
End: 2019-01-08
Payer: COMMERCIAL

## 2019-01-08 VITALS
TEMPERATURE: 98.1 F | DIASTOLIC BLOOD PRESSURE: 80 MMHG | SYSTOLIC BLOOD PRESSURE: 132 MMHG | HEIGHT: 70 IN | WEIGHT: 162 LBS | OXYGEN SATURATION: 98 % | HEART RATE: 70 BPM | BODY MASS INDEX: 23.19 KG/M2

## 2019-01-08 DIAGNOSIS — M25.551 HIP PAIN, RIGHT: ICD-10-CM

## 2019-01-08 DIAGNOSIS — C61 PROSTATE CANCER (H): ICD-10-CM

## 2019-01-08 DIAGNOSIS — K57.31 DIVERTICULAR HEMORRHAGE: Primary | ICD-10-CM

## 2019-01-08 DIAGNOSIS — D12.6 ADENOMATOUS POLYP OF COLON, UNSPECIFIED PART OF COLON: ICD-10-CM

## 2019-01-08 LAB — HGB BLD-MCNC: 11.2 G/DL (ref 13.3–17.7)

## 2019-01-08 PROCEDURE — 36415 COLL VENOUS BLD VENIPUNCTURE: CPT | Performed by: INTERNAL MEDICINE

## 2019-01-08 PROCEDURE — 99495 TRANSJ CARE MGMT MOD F2F 14D: CPT | Performed by: INTERNAL MEDICINE

## 2019-01-08 PROCEDURE — 85018 HEMOGLOBIN: CPT | Performed by: INTERNAL MEDICINE

## 2019-01-08 ASSESSMENT — MIFFLIN-ST. JEOR: SCORE: 1441.08

## 2019-01-08 NOTE — PATIENT INSTRUCTIONS
Do not take any aspirin or aspirin products like advil, aleve, ibuprofen, tylenol is fine to take    Call if you have more bleeding or feel ill    Hosea Lockett M.D.

## 2019-01-08 NOTE — PROGRESS NOTES
SUBJECTIVE:   Jose Ray is a 83 year old male who presents to clinic today for the following health issues:          Hospital Follow-up Visit:    Hospital/Nursing Home/IP Rehab Facility: [facility]  Date of Admission: 1/2/19  Date of Discharge: 1/3/19  Reason(s) for Admission: hematochezia            Problems taking medications regularly:  None       Medication changes since discharge: None       Problems adhering to non-medication therapy:  None    Summary of hospitalization:  [facility] discharge summary reviewed  Diagnostic Tests/Treatments reviewed.  Follow up needed: none  Other Healthcare Providers Involved in Patient s Care:         None  Update since discharge: improved.     Post Discharge Medication Reconciliation: discharge medications reconciled, continue medications without change.  Plan of care communicated with patient     Coding guidelines for this visit:  Type of Medical   Decision Making Face-to-Face Visit       within 7 Days of discharge Face-to-Face Visit        within 14 days of discharge   Moderate Complexity [id number]   High Complexity [id number]          The patient presents for follow-up to his recent GI bleed.  I reviewed the hospital notes as well as colon report.  He has no history of this.  He presented with bright red blood per rectum for about 2-3 days after taking quite a bit of NSAIDs on a trip to [place].  The bleeding stopped on his own.  His hemoglobin did drop.  Since his bowels have been normal.  He is not having bloody or black stools.  He is a bit weak.  No abdominal pain or nausea or vomiting.  No chest pain or shortness of breath.  No history of ulcers.  He does not smoke and alcohol is fairly minimal but none lately.    Patient also has had intermittent right lateral hip pain.  It sounds like it got worse in [place] and that is why he use the Advil.  Prior to that he is having off and on for quite some time.  He does have a history of  prostate cancer but no recent active disease.    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, hosp fsd     Eczema      Gallstone 2010    seen on ct for renal stone     HTN (hypertension) 6/14    us done and no hydro but renal cyst     Hx of colonoscopy 2011    nl     Hydronephrosis of left kidney 2010    seen on above ct     Nephrolithiasis 2010    seen on ct, hydronephrosis seen as well     Prostate cancer (H) 2008    had surgery, D.r Michelle     Skin cancer, basal cell 2002, 2008    had xrt 2002, mohs surgery 2008     Thrombophlebitis 1985     Past Surgical History:   Procedure Laterality Date     C NONSPECIFIC PROCEDURE  14, 36    HERNIORRHAPHY     C NONSPECIFIC PROCEDURE  1985    VEIN STRIPPING     CIRCUMCISION  3/12/2013    Procedure: CIRCUMCISION;  CIRCUMCISION;  Surgeon: Stevenson Martinez MD;  Location: [facility]     mohs surgery  2008, 2011    basal cell     TURP  2008    prostate cancer     Social History     Socioeconomic History     Marital status:      Spouse name: Not on file     Number of children: 3     Years of education: Not on file     Highest education level: Not on file   Social Needs     Financial resource strain: Not on file     Food insecurity - worry: Not on file     Food insecurity - inability: Not on file     Transportation needs - medical: Not on file     Transportation needs - non-medical: Not on file   Occupational History     Occupation: computer sales [employer]     Employer: RETIRED   Tobacco Use     Smoking status: Never Smoker     Smokeless tobacco: Never Used   Substance and Sexual Activity     Alcohol use: Yes     Alcohol/week: 0.0 oz     Comment: 0-2 drinks per week     Drug use: No     Sexual activity: No   Other Topics Concern     Parent/sibling w/ CABG, MI or angioplasty before 65F  "55M? Not Asked   Social History Narrative     Not on file     Current Outpatient Medications   Medication Sig Dispense Refill     amLODIPine (NORVASC) 5 MG tablet Take 1 tablet (5 mg) by mouth daily 90 tablet 3     ascorbic acid 1000 MG TABS tablet Take 1,000 mg by mouth 2 times daily       Cholecalciferol (VITAMIN D PO) Take 2,500 Units by mouth daily 2300 IU daily       GARLIC 600 MG OR CAPS 600mg orally daily.       Magnesium 400 MG CAPS Take 1 capsule by mouth every evening Magnesium Glycinate       Multiple vitamin  s TABS Take 1 tablet by mouth daily.       Omega-3 Fatty Acids (SALMON OIL-1000 PO) Take 1 capsule by mouth 2 times daily.       OS-REY ULTRA 600 MG OR TABS 1 TABLET TWICE DAILY       OSTEO BI-FLEX ADV TRIPLE ST OR TABS 1 po bid       triamcinolone (KENALOG) 0.1 % external cream Apply topically daily as needed for irritation (Feet.)       guaiFENesin (MUCINEX) 600 MG 12 hr tablet Take 600 mg by mouth 2 times daily       Allergies   Allergen Reactions     Cat Hair [Cats]      Pneumococcal Vaccine Rash     FAMILY HISTORY NOTED AND REVIEWED    REVIEW OF SYSTEMS: above    PHYSICAL EXAM    /80 (BP Location: Left arm, Patient Position: Chair, Cuff Size: Adult Large)   Pulse 70   Temp 98.1  F (36.7  C) (Oral)   Ht 1.778 m (5' 10\")   Wt 73.5 kg (162 lb)   SpO2 98%   BMI 23.24 kg/m      Patient appears non toxic  Mouth - tongue midline and within normal limits, mucous membranes and posterior pharynx within normal limits, no lesions seen.  Neck - no masses, lesions or tenderness  Nodes - no supraclavicular, cervical or axially adenopathy .  Lungs - clear, normal flow  Cardiovascular - regular rate and rhythm, no murmer, rub or gallop, no jvp or edema, carotids within normal limits, no bruits.  Abdomen - normal active bowel sounds, soft, non tender, no masses, guarding or rebound, no hepatosplenomegaly      Labs sent    ASSESSMENT:  1. Acute blood loss anemia, suspect diverticular bleed, doubt " upper gi bleed or other cause  2. Colon polyp, 5mm  3. Hip pain, suspect troch bursitis  4. Cap, doubt this is cause of hip pain    PLAN:  No nsaids or asa  Hemoglobin today  Follow up ortho  Call if problems  Follow up complete physical exam       Hosea Lockett M.D.

## 2019-01-08 NOTE — RESULT ENCOUNTER NOTE
It was nice seeing you.  Your hemoglobin is much improved as you can see.    If you have any questions please call me.    Hosea Lockett M.D.

## 2019-01-14 ENCOUNTER — TRANSFERRED RECORDS (OUTPATIENT)
Dept: HEALTH INFORMATION MANAGEMENT | Facility: CLINIC | Age: 84
End: 2019-01-14

## 2019-04-08 ENCOUNTER — OFFICE VISIT (OUTPATIENT)
Dept: FAMILY MEDICINE | Facility: CLINIC | Age: 84
End: 2019-04-08
Payer: COMMERCIAL

## 2019-04-08 VITALS
BODY MASS INDEX: 23.4 KG/M2 | SYSTOLIC BLOOD PRESSURE: 136 MMHG | DIASTOLIC BLOOD PRESSURE: 86 MMHG | TEMPERATURE: 97 F | HEART RATE: 69 BPM | HEIGHT: 69 IN | WEIGHT: 158 LBS | OXYGEN SATURATION: 97 %

## 2019-04-08 DIAGNOSIS — C61 PROSTATE CANCER (H): ICD-10-CM

## 2019-04-08 DIAGNOSIS — E78.5 HYPERLIPIDEMIA LDL GOAL <160: ICD-10-CM

## 2019-04-08 DIAGNOSIS — I10 BENIGN ESSENTIAL HYPERTENSION: ICD-10-CM

## 2019-04-08 DIAGNOSIS — R63.4 WEIGHT LOSS: ICD-10-CM

## 2019-04-08 DIAGNOSIS — Z00.01 ENCOUNTER FOR GENERAL ADULT MEDICAL EXAMINATION WITH ABNORMAL FINDINGS: Primary | ICD-10-CM

## 2019-04-08 PROBLEM — K57.31 DIVERTICULAR HEMORRHAGE: Status: RESOLVED | Noted: 2019-01-01 | Resolved: 2019-04-08

## 2019-04-08 PROBLEM — K92.1 HEMATOCHEZIA: Status: RESOLVED | Noted: 2019-01-02 | Resolved: 2019-04-08

## 2019-04-08 LAB
ERYTHROCYTE [DISTWIDTH] IN BLOOD BY AUTOMATED COUNT: 12.7 % (ref 10–15)
HCT VFR BLD AUTO: 44 % (ref 40–53)
HGB BLD-MCNC: 14.8 G/DL (ref 13.3–17.7)
MCH RBC QN AUTO: 32.7 PG (ref 26.5–33)
MCHC RBC AUTO-ENTMCNC: 33.6 G/DL (ref 31.5–36.5)
MCV RBC AUTO: 97 FL (ref 78–100)
PLATELET # BLD AUTO: 261 10E9/L (ref 150–450)
RBC # BLD AUTO: 4.52 10E12/L (ref 4.4–5.9)
WBC # BLD AUTO: 5.4 10E9/L (ref 4–11)

## 2019-04-08 PROCEDURE — 99397 PER PM REEVAL EST PAT 65+ YR: CPT | Performed by: INTERNAL MEDICINE

## 2019-04-08 PROCEDURE — 80061 LIPID PANEL: CPT | Performed by: INTERNAL MEDICINE

## 2019-04-08 PROCEDURE — 84153 ASSAY OF PSA TOTAL: CPT | Performed by: INTERNAL MEDICINE

## 2019-04-08 PROCEDURE — 36415 COLL VENOUS BLD VENIPUNCTURE: CPT | Performed by: INTERNAL MEDICINE

## 2019-04-08 PROCEDURE — 80053 COMPREHEN METABOLIC PANEL: CPT | Performed by: INTERNAL MEDICINE

## 2019-04-08 PROCEDURE — 99213 OFFICE O/P EST LOW 20 MIN: CPT | Mod: 25 | Performed by: INTERNAL MEDICINE

## 2019-04-08 PROCEDURE — 85027 COMPLETE CBC AUTOMATED: CPT | Performed by: INTERNAL MEDICINE

## 2019-04-08 PROCEDURE — 84443 ASSAY THYROID STIM HORMONE: CPT | Performed by: INTERNAL MEDICINE

## 2019-04-08 RX ORDER — AMLODIPINE BESYLATE 5 MG/1
5 TABLET ORAL DAILY
Qty: 90 TABLET | Refills: 3 | Status: SHIPPED | OUTPATIENT
Start: 2019-04-08 | End: 2020-03-16

## 2019-04-08 ASSESSMENT — MIFFLIN-ST. JEOR: SCORE: 1402.06

## 2019-04-08 ASSESSMENT — ACTIVITIES OF DAILY LIVING (ADL): CURRENT_FUNCTION: NO ASSISTANCE NEEDED

## 2019-04-08 NOTE — PROGRESS NOTES
SUBJECTIVE:   Jose Ray is a 84 year old male who presents for Preventive Visit.    The patient overall is doing well.  He has had no more signs of GI bleeding.  He has no GI symptoms.  The biggest issue has been weight loss.  This been going on for over a year.  He is not trying to.  He is eating well.  He continues to have nocturnal leg cramps which have been ongoing for years.  He has no leg symptoms during the day.  He has no headaches, fevers or night sweats.  A complete review of systems is otherwise negative.               Past Medical History:      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, hosp [facility]     Eczema      Gallstone 2010    seen on ct for renal stone     Generalized osteoarthrosis, unspecified site      HTN (hypertension) 6/14    us done and no hydro but renal cyst     Hx of colonoscopy 2011    nl     Hydronephrosis of left kidney 2010    seen on above ct     Nephrolithiasis 2010    seen on ct, hydronephrosis seen as well     Prostate cancer (H) 2008    had surgery, D.[facility] Michelle     Skin cancer, basal cell 2002, 2008    had xrt 2002, mohs surgery 2008     Thrombophlebitis 1985             Past Surgical History:      Past Surgical History:   Procedure Laterality Date     C NONSPECIFIC PROCEDURE  14, 36    HERNIORRHAPHY     C NONSPECIFIC PROCEDURE  1985    VEIN STRIPPING     CIRCUMCISION  3/12/2013    Procedure: CIRCUMCISION;  CIRCUMCISION;  Surgeon: Stevenson Martinez MD;  Location: [facility]     COLONOSCOPY  2011; 2019    Dr. Wills; [facility]     HERNIA REPAIR  1961    Hernia     mohs surgery  2008, 2011    basal cell     TURP  2008    prostate cancer     VASCULAR SURGERY  1985    vein stripping both legs some             Social History:     Social History     Socioeconomic History     Marital  status:      Spouse name: Not on file     Number of children: 3     Years of education: Not on file     Highest education level: Not on file   Occupational History     Occupation: computer sales [employer]     Employer: RETIRED   Social Needs     Financial resource strain: Not on file     Food insecurity:     Worry: Not on file     Inability: Not on file     Transportation needs:     Medical: Not on file     Non-medical: Not on file   Tobacco Use     Smoking status: Never Smoker     Smokeless tobacco: Never Used   Substance and Sexual Activity     Alcohol use: Yes     Alcohol/week: 0.0 oz     Comment: occasional     Drug use: No     Sexual activity: Never   Lifestyle     Physical activity:     Days per week: Not on file     Minutes per session: Not on file     Stress: Not on file   Relationships     Social connections:     Talks on phone: Not on file     Gets together: Not on file     Attends Voodoo service: Not on file     Active member of club or organization: Not on file     Attends meetings of clubs or organizations: Not on file     Relationship status: Not on file     Intimate partner violence:     Fear of current or ex partner: Not on file     Emotionally abused: Not on file     Physically abused: Not on file     Forced sexual activity: Not on file   Other Topics Concern     Parent/sibling w/ CABG, MI or angioplasty before 65F 55M? No   Social History Narrative     Not on file             Family History:   reviewed         Allergies:     Allergies   Allergen Reactions     Cat Hair [Cats]      Pneumococcal Vaccine Rash             Medications:     Current Outpatient Medications   Medication Sig Dispense Refill     amLODIPine (NORVASC) 5 MG tablet Take 1 tablet (5 mg) by mouth daily 90 tablet 3     ascorbic acid 1000 MG TABS tablet Take 1,000 mg by mouth 2 times daily       Cholecalciferol (VITAMIN D PO) Take 2,500 Units by mouth daily 2300 IU daily       GARLIC 600 MG OR CAPS 600mg orally daily.        "guaiFENesin (MUCINEX) 600 MG 12 hr tablet Take 600 mg by mouth 2 times daily       Magnesium 400 MG CAPS Take 1 capsule by mouth every evening Magnesium Glycinate       Multiple vitamin  s TABS Take 1 tablet by mouth daily.       Omega-3 Fatty Acids (SALMON OIL-1000 PO) Take 1 capsule by mouth 2 times daily.       OS-REY ULTRA 600 MG OR TABS 1 TABLET TWICE DAILY       OSTEO BI-FLEX ADV TRIPLE ST OR TABS 1 po bid       triamcinolone (KENALOG) 0.1 % external cream Apply topically daily as needed for irritation (Feet.)                 Review of Systems:   The 10 point Review of Systems is negative other than noted in the HPI           Physical Exam:   Blood pressure 136/86, pulse 69, temperature 97  F (36.1  C), temperature source Oral, height 1.753 m (5' 9\"), weight 71.7 kg (158 lb), SpO2 97 %.    Exam:  Constitutional: healthy appearing, alert and in no distress  Heent: Normocephalic. Head without obvious masses or lesions. PERRLDC, EOMI. Mouth exam within normal limits: tongue, mucous membranes, posterior pharynx all normal, no lesions or abnormalities seen.  Tm's and canals within normal limits bilaterally. Neck supple, no nuchal rigidity or masses. No supraclavicular, or cervical adenopathy. Thyroid symmetric, no masses.  Cardiovascular: Regular rate and rhythm, no murmer, rub or gallops.  JVP not elevated, no edema.  Carotids within normal limits bilaterally, no bruits.  Respiratory: Normal respiratory effort.  Lungs clear, normal flow, no wheezing or crackles.  Breasts: Normal bilaterally.  No masses or lesions.  Nipples within normal limites.  No axillary lesions or nodes.  Gastrointestinal: Normal active bowel sounds.   Soft, not tender, no masses, guarding or rebound.  No hepatosplenomegaly.   Genitourinary: Rectal not done  Musculoskeletal: extremities normal, no gross deformities noted.  Skin: no suspicious lesions or rashes   Neurologic: Mental status within normal limits.  Speech fluent.  No gross motor " "abnormalities and gait intact.  Psychiatric: mentation appears normal and affect normal.         Data:   Labs sent        Assessment:   1. Normal complete physical exam  2. Weight loss, ?cause, no signs malig cause, doubt tb, vasculitis  3. Hypertension, controlled  4. Diverticular bleed, no issues  5. Cap, concepción  6. Elevated cholesterol, follow up labs  7. Noct leg cramps, no signs pvd  8. hcm         Plan:   shingrix at pharm  Exercise, diet  Call if more weight loss  Letter with labs      Hosea Lockett M.D.            Are you in the first 12 months of your Medicare coverage?  No    Healthy Habits:     In general, how would you rate your overall health?  Good    Frequency of exercise:  None    Duration of exercise:  Less than 15 minutes    Do you usually eat at least 4 servings of fruit and vegetables a day, include whole grains    & fiber and avoid regularly eating high fat or \"junk\" foods?  Yes    Taking medications regularly:  Yes    Barriers to taking medications:  Not applicable    Medication side effects:  Not applicable    Ability to successfully perform activities of daily living:  No assistance needed    Home Safety:  No safety concerns identified    Hearing Impairment:  No hearing concerns    In the past 6 months, have you been bothered by leaking of urine?  No    In general, how would you rate your overall mental or emotional health?  Excellent      PHQ-2 Total Score: 0    Additional concerns today:  No    Do you feel safe in your environment?     Do you have a Health Care Directive?       Fall risk      Cognitive Screening         Reviewed and updated as needed this visit by clinical staff         Reviewed and updated as needed this visit by Provider        Social History     Tobacco Use     Smoking status: Never Smoker     Smokeless tobacco: Never Used   Substance Use Topics     Alcohol use: Yes     Alcohol/week: 0.0 oz     Comment: 0-2 drinks per week         Alcohol Use 4/5/2019   Prescreen: >3 " drinks/day or >7 drinks/week? No   Prescreen: >3 drinks/day or >7 drinks/week? -               Current providers sharing in care for this patient include:   Patient Care Team:  Hosea Lockett MD as PCP - General (Internal Medicine)  Hosea Lockett MD as Assigned PCP    The following health maintenance items are reviewed in Epic and correct as of today:  Health Maintenance   Topic Date Due     ZOSTER IMMUNIZATION (2 of 3) 02/04/2010     MEDICARE ANNUAL WELLNESS VISIT  03/20/2018     FALL RISK ASSESSMENT  01/08/2020     PHQ-2 Q1 YR  04/08/2020     DTAP/TDAP/TD IMMUNIZATION (3 - Td) 02/21/2023     ADVANCE DIRECTIVE PLANNING Q5 YRS  01/03/2024     INFLUENZA VACCINE  Completed     IPV IMMUNIZATION  Aged Out     MENINGITIS IMMUNIZATION  Aged Out

## 2019-04-08 NOTE — LETTER
[place] [address] [facility]  [address]  [place]  [address]  Tel: [phone]    April 9, 2019    Jose Ray  [address]  [facility] [address]        Dear Mr. Ray,    It was a pleasure seeing you for your physical examination.  I wanted to get back to you with your test results.  I have enclosed a copy for your review.      I am happy to report that your cbc or complete blood count is normal with no signs of anemia, leukemia or platelet abnormalities.  Your chemistry panel shows no signs of diabetes.  Your blood salts, kidney tests, liver tests, thyroid test, psa, and proteins are all fine.    Your total cholesterol is 228 with the normal range being below 200.  Your HDL or good cholesterol is wonderful at 127 with the normal range being above 40.  Your LDL or bad cholesterol is 86 with the normal range being below 130.  These numbers are super.    I am happy to bring you this overall excellent report.  If you continue to lose weight please let me know.  If you have any questions please call me.  If you have any further questions or problems, please contact our office.      Sincerely,    Hosea Lockett MD/SHARATH          Enclosure: Lab Results  Results for orders placed or performed in visit on 04/08/19   CBC with platelets   Result Value Ref Range    WBC 5.4 4.0 - 11.0 10e9/L    RBC Count 4.52 4.4 - 5.9 10e12/L    Hemoglobin 14.8 13.3 - 17.7 g/dL    Hematocrit 44.0 40.0 - 53.0 %    MCV 97 78 - 100 fl    MCH 32.7 26.5 - 33.0 pg    MCHC 33.6 31.5 - 36.5 g/dL    RDW 12.7 10.0 - 15.0 %    Platelet Count 261 150 - 450 10e9/L   Comprehensive metabolic panel   Result Value Ref Range    Sodium 137 133 - 144 mmol/L    Potassium 4.5 3.4 - 5.3 mmol/L    Chloride 104 94 - 109 mmol/L    Carbon Dioxide 27 20 - 32 mmol/L    Anion Gap 6 3 - 14 mmol/L    Glucose 96 70 - 99 mg/dL    Urea Nitrogen 32 (H) 7 - 30 mg/dL    Creatinine 1.21 0.66 - 1.25 mg/dL    GFR Estimate 55 (L) >60 mL/min/[1.73_m2]    GFR  Estimate If Black 64 >60 mL/min/[1.73_m2]    Calcium 9.0 8.5 - 10.1 mg/dL    Bilirubin Total 0.9 0.2 - 1.3 mg/dL    Albumin 4.0 3.4 - 5.0 g/dL    Protein Total 7.4 6.8 - 8.8 g/dL    Alkaline Phosphatase 59 40 - 150 U/L    ALT 20 0 - 70 U/L    AST 14 0 - 45 U/L   Lipid panel reflex to direct LDL Non-fasting   Result Value Ref Range    Cholesterol 228 (H) <200 mg/dL    Triglycerides 77 <150 mg/dL    HDL Cholesterol 127 >39 mg/dL    LDL Cholesterol Calculated 86 <100 mg/dL    Non HDL Cholesterol 101 <130 mg/dL   TSH with free T4 reflex   Result Value Ref Range    TSH 1.05 0.40 - 4.00 mU/L   PSA tumor marker   Result Value Ref Range    PSA 0.03 0 - 4 ug/L

## 2019-04-08 NOTE — PATIENT INSTRUCTIONS
Patient Education   Personalized Prevention Plan  You are due for the preventive services outlined below.  Your care team is available to assist you in scheduling these services.  If you have already completed any of these items, please share that information with your care team to update in your medical record.  Health Maintenance Due   Topic Date Due     Zoster (Shingles) Vaccine (2 of 3) 02/04/2010     Annual Wellness Visit  03/20/2018       Exercise for a Healthier Heart     Exercise with a friend. When activity is fun, you're more likely to stick with it.   You may wonder how you can improve the health of your heart. If you re thinking about exercise, you re on the right track. You don t need to become an athlete, but you do need a certain amount of brisk exercise to help strengthen your heart. If you have been diagnosed with a heart condition, your doctor may recommend exercise to help stabilize your condition. To help make exercise a habit, choose safe, fun activities.  Be sure to check with your healthcare provider before starting an exercise program.   Why exercise?  Exercising regularly offers many healthy rewards. It can help you do all of the following:    Improve your blood cholesterol level to help prevent further heart trouble    Lower your blood pressure to help prevent a stroke or heart attack    Control diabetes, or reduce your risk of getting this disease    Improve your heart and lung function    Reach and maintain a healthy weight    Make your muscles stronger and more limber so you can stay active    Prevent falls and fractures by slowing the loss of bone mass (osteoporosis)    Manage stress better    Reduce your blood pressure    Improve your sense of self and your body image  Exercise tips  Ease into your routine. Set small goals. Then build on them.  Exercise on most days. Aim for a total of 150 or more minutes of moderate to  vigorous intensity activity each week. Consider 40 minutes, 3  to 4 times a week. For best results, activity should last for 40 minutes on average. It is OK to work up to the 40 minute period over time. Examples of moderate-intensity activity is walking 1 mile in 15 minutes or 30 to 45 minutes of yard work.  Step up your daily activity level. Along with your exercise program, try being more active throughout the day. Walk instead of drive. Do more household tasks or yard work.  Choose one or more activities you enjoy. Walking is one of the easiest things you can do. You can also try swimming, riding a bike, dancing, or taking an exercise class.  Stop exercising and call your doctor if you:    Have chest pain or feel dizzy or lightheaded    Feel burning, tightness, pressure, or heaviness in your chest, neck, shoulders, back, or arms    Have unusual shortness of breath    Have increased joint or muscle pain    Have palpitations or an irregular heartbeat   Date Last Reviewed: 5/1/2016 2000-2018 [employer]. [address] [place] [id number]. All rights reserved. This information is not intended as a substitute for professional medical care. Always follow your healthcare professional's instructions.

## 2019-04-09 LAB
ALBUMIN SERPL-MCNC: 4 G/DL (ref 3.4–5)
ALP SERPL-CCNC: 59 U/L (ref 40–150)
ALT SERPL W P-5'-P-CCNC: 20 U/L (ref 0–70)
ANION GAP SERPL CALCULATED.3IONS-SCNC: 6 MMOL/L (ref 3–14)
AST SERPL W P-5'-P-CCNC: 14 U/L (ref 0–45)
BILIRUB SERPL-MCNC: 0.9 MG/DL (ref 0.2–1.3)
BUN SERPL-MCNC: 32 MG/DL (ref 7–30)
CALCIUM SERPL-MCNC: 9 MG/DL (ref 8.5–10.1)
CHLORIDE SERPL-SCNC: 104 MMOL/L (ref 94–109)
CHOLEST SERPL-MCNC: 228 MG/DL
CO2 SERPL-SCNC: 27 MMOL/L (ref 20–32)
CREAT SERPL-MCNC: 1.21 MG/DL (ref 0.66–1.25)
GFR SERPL CREATININE-BSD FRML MDRD: 55 ML/MIN/{1.73_M2}
GLUCOSE SERPL-MCNC: 96 MG/DL (ref 70–99)
HDLC SERPL-MCNC: 127 MG/DL
LDLC SERPL CALC-MCNC: 86 MG/DL
NONHDLC SERPL-MCNC: 101 MG/DL
POTASSIUM SERPL-SCNC: 4.5 MMOL/L (ref 3.4–5.3)
PROT SERPL-MCNC: 7.4 G/DL (ref 6.8–8.8)
PSA SERPL-MCNC: 0.03 UG/L (ref 0–4)
SODIUM SERPL-SCNC: 137 MMOL/L (ref 133–144)
TRIGL SERPL-MCNC: 77 MG/DL
TSH SERPL DL<=0.005 MIU/L-ACNC: 1.05 MU/L (ref 0.4–4)

## 2019-04-09 NOTE — RESULT ENCOUNTER NOTE
It was a pleasure seeing you for your physical examination.  I wanted to get back to you with your test results.  I have enclosed a copy for your review.      I am happy to report that your cbc or complete blood count is normal with no signs of anemia, leukemia or platelet abnormalities.  Your chemistry panel shows no signs of diabetes.  Your blood salts, kidney tests, liver tests, thyroid test, psa, and proteins are all fine.    Your total cholesterol is 228 with the normal range being below 200.  Your HDL or good cholesterol is wonderful at 127 with the normal range being above 40.  Your LDL or bad cholesterol is 86 with the normal range being below 130.  These numbers are super.    I am happy to bring you this overall excellent report.  If you continue to lose weight please let me know.  If you have any questions please call me.    Hosea Lockett M.D.

## 2019-10-01 ENCOUNTER — HEALTH MAINTENANCE LETTER (OUTPATIENT)
Age: 84
End: 2019-10-01

## 2019-12-30 DIAGNOSIS — I10 BENIGN ESSENTIAL HYPERTENSION: ICD-10-CM

## 2019-12-30 NOTE — TELEPHONE ENCOUNTER
"Last Written Prescription Date:  4/8/19  Last Fill Quantity: 90,  # refills: 3   Last office visit: 4/8/2019 with prescribing provider:     Future Office Visit:    Requested Prescriptions   Pending Prescriptions Disp Refills     amLODIPine (NORVASC) 5 MG tablet [Pharmacy Med Name: AMLODIPINE 5 MG TAB 5 TAB] 90 tablet 3     Sig: TAKE 1 TABLET BY MOUTH ONCE DAILY       Calcium Channel Blockers Protocol  Passed - 12/30/2019  1:15 PM        Passed - Blood pressure under 140/90 in past 12 months     BP Readings from Last 3 Encounters:   04/08/19 136/86   01/08/19 132/80   01/03/19 136/85                 Passed - Recent (12 mo) or future (30 days) visit within the authorizing provider's specialty     Patient has had an office visit with the authorizing provider or a provider within the authorizing providers department within the previous 12 mos or has a future within next 30 days. See \"Patient Info\" tab in inbasket, or \"Choose Columns\" in Meds & Orders section of the refill encounter.              Passed - Medication is active on med list        Passed - Patient is age 18 or older        Passed - Normal serum creatinine on file in past 12 months     Recent Labs   Lab Test 04/08/19  1130   CR 1.21               "

## 2020-01-02 RX ORDER — AMLODIPINE BESYLATE 5 MG/1
TABLET ORAL
Qty: 90 TABLET | Refills: 3 | OUTPATIENT
Start: 2020-01-02

## 2020-02-22 ENCOUNTER — OFFICE VISIT (OUTPATIENT)
Dept: URGENT CARE | Facility: URGENT CARE | Age: 85
End: 2020-02-22
Payer: COMMERCIAL

## 2020-02-22 ENCOUNTER — APPOINTMENT (OUTPATIENT)
Dept: ULTRASOUND IMAGING | Facility: CLINIC | Age: 85
End: 2020-02-22
Attending: EMERGENCY MEDICINE
Payer: COMMERCIAL

## 2020-02-22 ENCOUNTER — HOSPITAL ENCOUNTER (EMERGENCY)
Facility: CLINIC | Age: 85
Discharge: HOME | End: 2020-02-22
Attending: EMERGENCY MEDICINE | Admitting: EMERGENCY MEDICINE
Payer: COMMERCIAL

## 2020-02-22 VITALS
SYSTOLIC BLOOD PRESSURE: 130 MMHG | WEIGHT: 175 LBS | BODY MASS INDEX: 25.84 KG/M2 | HEART RATE: 76 BPM | DIASTOLIC BLOOD PRESSURE: 70 MMHG | RESPIRATION RATE: 20 BRPM | TEMPERATURE: 98.8 F

## 2020-02-22 VITALS
TEMPERATURE: 98.1 F | WEIGHT: 168 LBS | HEIGHT: 68 IN | DIASTOLIC BLOOD PRESSURE: 68 MMHG | SYSTOLIC BLOOD PRESSURE: 148 MMHG | OXYGEN SATURATION: 98 % | BODY MASS INDEX: 25.46 KG/M2 | RESPIRATION RATE: 18 BRPM

## 2020-02-22 DIAGNOSIS — M79.604 PAIN AND SWELLING OF RIGHT LOWER EXTREMITY: Primary | ICD-10-CM

## 2020-02-22 DIAGNOSIS — M79.89 PAIN AND SWELLING OF RIGHT LOWER EXTREMITY: Primary | ICD-10-CM

## 2020-02-22 DIAGNOSIS — I82.4Z1 ACUTE DEEP VEIN THROMBOSIS (DVT) OF DISTAL VEIN OF RIGHT LOWER EXTREMITY (H): ICD-10-CM

## 2020-02-22 LAB
ANION GAP SERPL CALCULATED.3IONS-SCNC: 3 MMOL/L (ref 3–14)
BASOPHILS # BLD AUTO: 0 10E9/L (ref 0–0.2)
BASOPHILS NFR BLD AUTO: 0.3 %
BUN SERPL-MCNC: 29 MG/DL (ref 7–30)
CALCIUM SERPL-MCNC: 8.9 MG/DL (ref 8.5–10.1)
CHLORIDE SERPL-SCNC: 112 MMOL/L (ref 94–109)
CO2 SERPL-SCNC: 25 MMOL/L (ref 20–32)
CREAT SERPL-MCNC: 1.01 MG/DL (ref 0.66–1.25)
CRP SERPL-MCNC: 17.7 MG/L (ref 0–8)
DIFFERENTIAL METHOD BLD: ABNORMAL
EOSINOPHIL # BLD AUTO: 0.4 10E9/L (ref 0–0.7)
EOSINOPHIL NFR BLD AUTO: 5.1 %
ERYTHROCYTE [DISTWIDTH] IN BLOOD BY AUTOMATED COUNT: 12.6 % (ref 10–15)
ERYTHROCYTE [SEDIMENTATION RATE] IN BLOOD BY WESTERGREN METHOD: 29 MM/H (ref 0–20)
GFR SERPL CREATININE-BSD FRML MDRD: 68 ML/MIN/{1.73_M2}
GLUCOSE SERPL-MCNC: 98 MG/DL (ref 70–99)
HCT VFR BLD AUTO: 39.9 % (ref 40–53)
HGB BLD-MCNC: 13 G/DL (ref 13.3–17.7)
IMM GRANULOCYTES # BLD: 0 10E9/L (ref 0–0.4)
IMM GRANULOCYTES NFR BLD: 0.4 %
INR PPP: 1 (ref 0.86–1.14)
LYMPHOCYTES # BLD AUTO: 1.4 10E9/L (ref 0.8–5.3)
LYMPHOCYTES NFR BLD AUTO: 19.7 %
MCH RBC QN AUTO: 31.6 PG (ref 26.5–33)
MCHC RBC AUTO-ENTMCNC: 32.6 G/DL (ref 31.5–36.5)
MCV RBC AUTO: 97 FL (ref 78–100)
MONOCYTES # BLD AUTO: 0.8 10E9/L (ref 0–1.3)
MONOCYTES NFR BLD AUTO: 10.8 %
NEUTROPHILS # BLD AUTO: 4.5 10E9/L (ref 1.6–8.3)
NEUTROPHILS NFR BLD AUTO: 63.7 %
NRBC # BLD AUTO: 0 10*3/UL
NRBC BLD AUTO-RTO: 0 /100
PLATELET # BLD AUTO: 241 10E9/L (ref 150–450)
POTASSIUM SERPL-SCNC: 4 MMOL/L (ref 3.4–5.3)
RBC # BLD AUTO: 4.11 10E12/L (ref 4.4–5.9)
SODIUM SERPL-SCNC: 140 MMOL/L (ref 133–144)
WBC # BLD AUTO: 7.1 10E9/L (ref 4–11)

## 2020-02-22 PROCEDURE — 93971 EXTREMITY STUDY: CPT | Mod: RT

## 2020-02-22 PROCEDURE — 99285 EMERGENCY DEPT VISIT HI MDM: CPT | Mod: 25

## 2020-02-22 PROCEDURE — 80048 BASIC METABOLIC PNL TOTAL CA: CPT | Performed by: EMERGENCY MEDICINE

## 2020-02-22 PROCEDURE — 85652 RBC SED RATE AUTOMATED: CPT | Performed by: EMERGENCY MEDICINE

## 2020-02-22 PROCEDURE — 96372 THER/PROPH/DIAG INJ SC/IM: CPT

## 2020-02-22 PROCEDURE — 25000128 H RX IP 250 OP 636: Performed by: EMERGENCY MEDICINE

## 2020-02-22 PROCEDURE — 99214 OFFICE O/P EST MOD 30 MIN: CPT | Performed by: FAMILY MEDICINE

## 2020-02-22 PROCEDURE — 85610 PROTHROMBIN TIME: CPT | Performed by: EMERGENCY MEDICINE

## 2020-02-22 PROCEDURE — 85025 COMPLETE CBC W/AUTO DIFF WBC: CPT | Performed by: EMERGENCY MEDICINE

## 2020-02-22 PROCEDURE — 86140 C-REACTIVE PROTEIN: CPT | Performed by: EMERGENCY MEDICINE

## 2020-02-22 PROCEDURE — 25000132 ZZH RX MED GY IP 250 OP 250 PS 637: Performed by: EMERGENCY MEDICINE

## 2020-02-22 RX ORDER — WARFARIN SODIUM 5 MG/1
5 TABLET ORAL
Status: COMPLETED | OUTPATIENT
Start: 2020-02-22 | End: 2020-02-22

## 2020-02-22 RX ORDER — WARFARIN SODIUM 5 MG/1
5 TABLET ORAL DAILY
Qty: 30 TABLET | Refills: 0 | Status: SHIPPED | OUTPATIENT
Start: 2020-02-22 | End: 2020-07-27

## 2020-02-22 RX ADMIN — WARFARIN SODIUM 5 MG: 5 TABLET ORAL at 22:47

## 2020-02-22 RX ADMIN — ENOXAPARIN SODIUM 80 MG: 80 INJECTION SUBCUTANEOUS at 22:47

## 2020-02-22 ASSESSMENT — ENCOUNTER SYMPTOMS
SHORTNESS OF BREATH: 0
DIAPHORESIS: 0
CHILLS: 0
FEVER: 0
COLOR CHANGE: 1

## 2020-02-22 ASSESSMENT — MIFFLIN-ST. JEOR: SCORE: 1426.54

## 2020-02-22 NOTE — ED AVS SNAPSHOT
Emergency Department  [address] [place] [address]  Phone:  [phone]  Fax:  [phone]                                    Jose Ray   MRN: [id number]    Department:   Emergency Department   Date of Visit:  2/22/2020           After Visit Summary Signature Page    I have received my discharge instructions, and my questions have been answered. I have discussed any challenges I see with this plan with the nurse or doctor.    ..........................................................................................................................................  Patient/Patient Representative Signature      ..........................................................................................................................................  Patient Representative Print Name and Relationship to Patient    ..................................................               ................................................  Date                                   Time    ..........................................................................................................................................  Reviewed by Signature/Title    ...................................................              ..............................................  Date                                               Time          22EPIC Rev 08/18

## 2020-02-23 NOTE — ED PROVIDER NOTES
"  History     Chief Complaint:  Leg Swelling (RLE)      HPI   Jose Ray is a 85 year old male, history of blood clot and thrombophlebitis, who presents with right lower leg swelling and moderate nonradiating discomfort. He notes that over the past couple days he has had increased swelling and mild tenderness and increased pain when walking or with touch. He denies fever, chills, chest pain, shortness of breath, or night sweats.     Allergies:  Pneumococcal Vaccine    Medications:    Magnesium   Amlodipine     Past Medical History:    Diverticular hemorrhage  Eczema  Gallstone  Osteoarthrosis  Hydronephrosis  Nephrolithiasis  Skin cancer  Thrombophlebitis  Hypertension   Hyperlipidemia    Past Surgical History:    Herniorrhaphy  Vein stripping  Circumcision  Mohs surgery  TURP, prostate cancer    Family History:    Hypertension, mother  Arthritis, mother  GI disease, mother  Cancer, father  Asthma, father  CAD, paternal grandmother    Social History:  Smoking status: Never smoker  Alcohol use: Yes  Drug use: No  PCP: Hosea Lockett  Presents to the ED with wife   Marital Status:   [2]    Review of Systems   Constitutional: Negative for chills, diaphoresis and fever.   Respiratory: Negative for shortness of breath.    Cardiovascular: Positive for leg swelling (RLE). Negative for chest pain.   Skin: Positive for color change (erythema to the RLE (ankle)).   All other systems reviewed and are negative.      Physical Exam     Patient Vitals for the past 24 hrs:   BP Temp Temp src Heart Rate Resp SpO2 Height Weight   02/22/20 2304 (!) 148/68 -- -- 76 18 98 % -- --   02/22/20 1957 (!) 159/78 98.1  F (36.7  C) Oral 76 18 98 % 1.727 m (5' 8\") 76.2 kg (168 lb)        Physical Exam   Constitutional: Well developed, nontox appearance  Head: Atraumatic.   Mouth/Throat: Oropharynx is clear and moist.   Neck:  no stridor  Eyes: no scleral icterus  Cardiovascular: RRR, 2+ bilat radial, R DP pulses  Pulmonary/Chest: nml " resp effort, Clear BS bilat  Abdominal: ND, +BS, soft, NT, no rebound or guarding   : no CVA tenderness bilat  Ext: Warm, well perfused, no edema   RLE: tenderness to calf, erythema and edema to mid lower leg into foot  Neurological: A&O, symmetric facies, moves ext x4  Skin: Skin is warm and dry.   Psychiatric: Behavior is normal. Thought content normal.   Nursing note and vitals reviewed.          Emergency Department Course     Imaging:  Radiology findings were communicated with the patient who voiced understanding of the findings.    US Right lower extremity:   Positive study for deep vein thrombus in the right gastrocnemius vein.    Reading per radiology    Laboratory:  Laboratory findings were communicated with the patient who voiced understanding of the findings.    CBC: WBC 7.1, HGB 13.0(L),   BMP: Chloride 112(H) o/w WNL (Creatinine 1.01)  CRP inflammation: 17.1(H)  Erythrocyte sed rate: 29(H)  INR: pending    Procedures    Interventions:    Medications   enoxaparin ANTICOAGULANT (LOVENOX) injection 80 mg (80 mg Subcutaneous Given 2/22/20 2247)   warfarin ANTICOAGULANT (COUMADIN) tablet 5 mg (5 mg Oral Given 2/22/20 2247)       Emergency Department Course:   Nursing notes and vitals reviewed.    2019 I performed an exam of the patient as documented above.     2034 IV was inserted and blood was drawn for laboratory testing, results above.     2115 The patient was sent for a US of RLE (Ankle) while in the emergency department, results above.        Impression & Plan      Medical Decision Making:  Jose Ray is a 85 year old male who presents to the emergency department today for evaluation of R leg swelling and pain    Patient's presentation consistent with DVT which was confirmed on ultrasound.  He has no chest pain or shortness of breath and vital signs within normal limits; doubt PE therefore CT chest not ordered for further evaluation.  Doubt cellulitis, necrotizing fasciitis, fracture.   Labs ordered as noted above.  Given the patient's history of significant diverticulosis, I think would be reasonable to start him on warfarin and Lovenox given their reversibility and potentially transfer him to a day lack should he have no complications while on warfarin.  First dose of lovenox and warfarin given in ED.  At this time I feel the patient is safe for discharge.  Recommendations given regarding follow up with PCP, anticoagulation clinic and return to the emergency department as needed for new or worsening symptoms.  Pt counseled on all results, diagnosis and disposition.  They are understanding and agreeable to plan. Patient discharged in stable condition.        Diagnosis:    ICD-10-CM    1. Acute deep vein thrombosis (DVT) of distal vein of right lower extremity (H) I82.4Z1 INR       Disposition:   The patient is discharged to home.     Discharge Medications:  Discharge Medication List as of 2/22/2020 10:52 PM      START taking these medications    Details   enoxaparin ANTICOAGULANT (LOVENOX) 80 MG/0.8ML syringe Inject 0.8 mLs (80 mg) Subcutaneous every 12 hours for 7 days, Disp-11.2 mL, R-1, Local Print      warfarin ANTICOAGULANT (COUMADIN) 5 MG tablet Take 1 tablet (5 mg) by mouth daily, Disp-30 tablet, R-0, Local Print             Scribe Disclosure:  I, Cate Tolliver, am serving as a scribe at 2019 on 2/22/2020 to document services personally performed by Ap Loza MD based on my observations and the provider's statements to me.   EMERGENCY DEPARTMENT       Ap Loza MD  02/23/20 [id number]

## 2020-02-23 NOTE — ED TRIAGE NOTES
Patient has a history of phlebitis and DVT, today presents with complaints of RLE swelling and discomfort. Patient denied chest pain, dizziness, shortness of breath, nausea and vomiting. Patient is not on any blood thinners.

## 2020-02-23 NOTE — DISCHARGE INSTRUCTIONS
Please follow-up at [facility] on Tuesday for INR check.    Please return to the emergency department as needed for new or worsening symptoms including chest pain, shortness of breath, black or bloody bowel movements, fainting, fever greater than 100.4  F, any other concerning symptoms.

## 2020-02-23 NOTE — PROGRESS NOTES
SUBJECTIVE:  Chief Complaint   Patient presents with     Ankle Pain     rt ankle with swelling,redness,     Jose Ray is a 85 year old male who presents with a chief complaint of right leg and  Foot pain, swelling, tenderness and redness.  Symptoms began 2 day(s) ago, are moderate and gradual onset, still present and constant  With finger pressure to the swelling a depression remains in the skin  no  open wounds or weeping of the legs     Has history of past DVT or hypercoagulable state  - - past thrombophlebitis  Has history of varicose veins/ venous insufficiency - not treated  History of heart failure/ chest pain/  Shortness of breath - no  History of kidney or liver disease-- no  Has history of prolonged sitting or inactivity - no     Injury:No.        He treated it initially with no therapy. This is the first time this type of swelling and redness has occurred to this patient.     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, hosp fsd     Eczema      Gallstone 2010    seen on ct for renal stone     Generalized osteoarthrosis, unspecified site      HTN (hypertension) 6/14    us done and no hydro but renal cyst     Hx of colonoscopy 2011    nl     Hydronephrosis of left kidney 2010    seen on above ct     Nephrolithiasis 2010    seen on ct, hydronephrosis seen as well     Prostate cancer (H) 2008    had surgery, D.r Michelle     Skin cancer, basal cell 2002, 2008    had xrt 2002, mohs surgery 2008     Thrombophlebitis 1985     Patient Active Problem List   Diagnosis     MALIG NEOPLASM  /SKIN NECK-rx irradiation     HYPERLIPIDEMIA LDL GOAL <160     Eczema     Benign essential  hypertension     Adenomatous colon polyp     Prostate cancer (H)       ALLERGIES:  Cat hair [cats] and Pneumococcal vaccine    MEDs  amLODIPine (NORVASC) 5 MG tablet, Take 1 tablet (5 mg) by mouth daily  ascorbic acid 1000 MG TABS tablet, Take 1,000 mg by mouth 2 times daily  Cholecalciferol (VITAMIN D PO), Take 2,500 Units by mouth daily 2300 IU daily  GARLIC 600 MG OR CAPS, 600mg orally daily.  guaiFENesin (MUCINEX) 600 MG 12 hr tablet, Take 600 mg by mouth 2 times daily  Magnesium 400 MG CAPS, Take 1 capsule by mouth every evening Magnesium Glycinate  Multiple vitamin  s TABS, Take 1 tablet by mouth daily.  Omega-3 Fatty Acids (SALMON OIL-1000 PO), Take 1 capsule by mouth 2 times daily.  OS-REY ULTRA 600 MG OR TABS, 1 TABLET TWICE DAILY  OSTEO BI-FLEX ADV TRIPLE ST OR TABS, 1 po bid  triamcinolone (KENALOG) 0.1 % external cream, Apply topically daily as needed for irritation (Feet.)    No current facility-administered medications on file prior to visit.       Social History     Tobacco Use     Smoking status: Never Smoker     Smokeless tobacco: Never Used   Substance Use Topics     Alcohol use: Yes     Alcohol/week: 0.0 standard drinks     Comment: occasional       Family History   Problem Relation Age of Onset     Hypertension Mother         dec.     Arthritis Mother      Gastrointestinal Disease Mother      Cancer Father         Lung     Other Cancer Father         Lung     Asthma Father      C.A.D. Paternal Grandmother         ?     Heart Disease Maternal Grandmother         ?     Hypertension Maternal Grandmother         dec.         ROS:  CONSTITUTIONAL:NEGATIVE for fever, chills,   INTEGUMENTARY/SKIN: NEGATIVE for worrisome rashes,   or lesions  EYES: NEGATIVE for vision changes or irritation  ENT/MOUTH: NEGATIVE for ear, mouth and throat problems  RESP:NEGATIVE for significant cough or SOB    EXAM:   /70 (Cuff Size: Adult Regular)   Pulse 76   Temp 98.8  F (37.1  C) (Oral)   Resp 20   Wt 79.4 kg  (175 lb)   BMI 25.84 kg/m    M/S Exam:   Right leg  Edema with pitting is noted of the ankle  /  Top of the foot/   Calf region/    Erythema of the ankle are-  Pain to palpation of the right calf    Left  leg  Edema with pitting is noted of the ankle -  No tenderness of the calf or ankle  Skin:  Has some bilateral varicose veins  no bronze discoloration to the skin of the lower legs  no skin thickening / fibrosis of the skin of the bilateral lower leg     EYES:   EOMI,   conjunctiva clear  HENT:   External ears with no swelling or lesions   Nose and lips without  Swelling, ulcers, erythema or lesions  NECK:   normal pain free ROM  RESP:   no labored respirations, no tachypnea  EXTREMITIES:   Full ROM without expression of pain or limitation x 4 extremities  NEURO:   Normal strength and tone, ambulation without difficulty,   normal speech and mentation   PSYCH:   mentation and affect appears normal and patient appearance--appropriately groomed         ASSESSMENT:  Pain and swelling of right lower extremity     Discussed with the asymmetric right greater than left swelling with redness on the right and right calf tenderness that he needs evaluation for possible DVT    He will go to [facility] ED for further evaluation with appropriate imaging

## 2020-02-24 ENCOUNTER — TELEPHONE (OUTPATIENT)
Dept: FAMILY MEDICINE | Facility: CLINIC | Age: 85
End: 2020-02-24

## 2020-02-24 NOTE — TELEPHONE ENCOUNTER
Please call the patient and see if he can come at 9 this morning for follow-up of his ER visit.    Hosea Lockett M.D.

## 2020-02-25 ENCOUNTER — OFFICE VISIT (OUTPATIENT)
Dept: FAMILY MEDICINE | Facility: CLINIC | Age: 85
End: 2020-02-25
Payer: COMMERCIAL

## 2020-02-25 ENCOUNTER — TELEPHONE (OUTPATIENT)
Dept: FAMILY MEDICINE | Facility: CLINIC | Age: 85
End: 2020-02-25

## 2020-02-25 ENCOUNTER — ANTICOAGULATION THERAPY VISIT (OUTPATIENT)
Dept: FAMILY MEDICINE | Facility: CLINIC | Age: 85
End: 2020-02-25

## 2020-02-25 VITALS
TEMPERATURE: 97 F | WEIGHT: 172 LBS | HEIGHT: 68 IN | DIASTOLIC BLOOD PRESSURE: 79 MMHG | SYSTOLIC BLOOD PRESSURE: 135 MMHG | OXYGEN SATURATION: 97 % | BODY MASS INDEX: 26.07 KG/M2 | HEART RATE: 71 BPM

## 2020-02-25 DIAGNOSIS — I82.4Z1 DVT, LOWER EXTREMITY, DISTAL, ACUTE, RIGHT (H): ICD-10-CM

## 2020-02-25 LAB
ERYTHROCYTE [DISTWIDTH] IN BLOOD BY AUTOMATED COUNT: 12.5 % (ref 10–15)
HCT VFR BLD AUTO: 41.7 % (ref 40–53)
HGB BLD-MCNC: 13.5 G/DL (ref 13.3–17.7)
INR PPP: 1.37 (ref 0.86–1.14)
MCH RBC QN AUTO: 32.1 PG (ref 26.5–33)
MCHC RBC AUTO-ENTMCNC: 32.4 G/DL (ref 31.5–36.5)
MCV RBC AUTO: 99 FL (ref 78–100)
PLATELET # BLD AUTO: 281 10E9/L (ref 150–450)
RBC # BLD AUTO: 4.21 10E12/L (ref 4.4–5.9)
WBC # BLD AUTO: 6.6 10E9/L (ref 4–11)

## 2020-02-25 PROCEDURE — 85610 PROTHROMBIN TIME: CPT | Performed by: INTERNAL MEDICINE

## 2020-02-25 PROCEDURE — 36415 COLL VENOUS BLD VENIPUNCTURE: CPT | Performed by: INTERNAL MEDICINE

## 2020-02-25 PROCEDURE — 81241 F5 GENE: CPT | Mod: 59 | Performed by: INTERNAL MEDICINE

## 2020-02-25 PROCEDURE — 85027 COMPLETE CBC AUTOMATED: CPT | Performed by: INTERNAL MEDICINE

## 2020-02-25 PROCEDURE — 99214 OFFICE O/P EST MOD 30 MIN: CPT | Performed by: INTERNAL MEDICINE

## 2020-02-25 RX ORDER — UBIDECARENONE 50 MG
CAPSULE ORAL
COMMUNITY
End: 2020-07-27

## 2020-02-25 ASSESSMENT — MIFFLIN-ST. JEOR: SCORE: 1444.69

## 2020-02-25 NOTE — TELEPHONE ENCOUNTER
Pt was referred into the [facility] by . Called and spoke to pt and his wife. They report that they decided to go on xarelto instead of staying on warfarin which was prescribed in the ER after a dx of DVT. Discussed with  and he confirmed that pt will start Xarelto and will not be continuing with warfarin.

## 2020-02-25 NOTE — PATIENT INSTRUCTIONS
1. Change to xarelto 15mg twice daily for 3 weeks, then change to 20mg daily and stay on that    2. Once you start the xarelto stop the lovenox and warfarin    3. See me in 4 weeks      Hosea Lockett M.D.

## 2020-02-25 NOTE — LETTER
[place] [address] [facility] [facility]  [address]  [place]  [address]  Tel: [phone]    February 26, 2020    Jose Ray  [address]  [place] [address]        Dear Mr. Ray,    It was a pleasure seeing you.  I wanted to get back to you with your test results.  I have enclosed a copy for your records.     Your hemoglobin is very stable as you can see.     If you have any questions please call me.   Sincerely,    Hosea Lockett MD / carlito      Resulted Orders   CBC with platelets   Result Value Ref Range    WBC 6.6 4.0 - 11.0 10e9/L    RBC Count 4.21 (L) 4.4 - 5.9 10e12/L    Hemoglobin 13.5 13.3 - 17.7 g/dL    Hematocrit 41.7 40.0 - 53.0 %    MCV 99 78 - 100 fl    MCH 32.1 26.5 - 33.0 pg    MCHC 32.4 31.5 - 36.5 g/dL    RDW 12.5 10.0 - 15.0 %    Platelet Count 281 150 - 450 10e9/L   INR   Result Value Ref Range    INR 1.37 (H) 0.86 - 1.14

## 2020-02-25 NOTE — PROGRESS NOTES
The patient presents with his wife for follow-up to his emergency room visit.  I reviewed that note and ultrasound as well as labs.    The patient does have what sounds like a history of a DVT in 1996.  He was a trip to [place] and we got back he was hospitalized with leg clots in his right lower leg.  I do not have the actual report.  He was given Coumadin.  Since then he has had no clots.  Of significance is the fact that his daughter has factor V Leyden positive.  No other family members are known but his mother also had a DVT.    The patient himself feels fine.  No chest pain or shortness of breath.  No falls or unsteadiness.  He is not a smoker but drinker.  No GI symptoms.  He does have a history of the diverticular hemorrhage.  He does not take NSAIDs.  A complete review of systems is otherwise negative.  He has been taking the Lovenox and Coumadin since Saturday faithfully.    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, hosp fsd     DVT, lower extremity, distal, acute, right (H) 02/2020, 1996    gastrocnemius vein in 2020, not sure which vein in 1996     Eczema      Gallstone 2010    seen on ct for renal stone     Generalized osteoarthrosis, unspecified site      HTN (hypertension) 6/14    us done and no hydro but renal cyst     Hx of colonoscopy 2011    nl     Hydronephrosis of left kidney 2010    seen on above ct     Nephrolithiasis 2010    seen on ct, hydronephrosis seen as well     Prostate cancer (H) 2008    had surgery, D.r Michelle     Skin cancer, basal cell 2002, 2008    had xrt 2002, mohs surgery 2008     Thrombophlebitis 1985     Past Surgical History:   Procedure Laterality Date     C NONSPECIFIC PROCEDURE  14, 36    HERNIORRHAPHY     C NONSPECIFIC PROCEDURE  1985    VEIN STRIPPING      CIRCUMCISION  3/12/2013    Procedure: CIRCUMCISION;  CIRCUMCISION;  Surgeon: Stevenson Martinez MD;  [facility]     [facility]     HERNIA REPAIR  1961    Hernia     mohs surgery  2008, 2011    basal cell     TURP  2008    prostate cancer     VASCULAR SURGERY  1985    vein stripping both legs some     Social History     Socioeconomic History     Marital status:      Spouse name: Not on file     Number of children: 3     Years of education: Not on file     Highest education level: Not on file   Occupational History     Occupation: [employer]     Employer: RETIRED   Social Needs     Financial resource strain: Not on file     Food insecurity:     Worry: Not on file     Inability: Not on file     Transportation needs:     Medical: Not on file     Non-medical: Not on file   Tobacco Use     Smoking status: Never Smoker     Smokeless tobacco: Never Used   Substance and Sexual Activity     Alcohol use: Yes     Alcohol/week: 0.0 standard drinks     Comment: occasional     Drug use: No     Sexual activity: Never   Lifestyle     Physical activity:     Days per week: Not on file     Minutes per session: Not on file     Stress: Not on file   Relationships     Social connections:     Talks on phone: Not on file     Gets together: Not on file     Attends Jehovah's witness service: Not on file     Active member of club or organization: Not on file     Attends meetings of clubs or organizations: Not on file     Relationship status: Not on file     Intimate partner violence:     Fear of current or ex partner: Not on file     Emotionally abused: Not on file     Physically abused: Not on file     Forced sexual activity: Not on file   Other Topics Concern     Parent/sibling w/ CABG, MI or angioplasty before 65F 55M? No   Social History Narrative     Not on file     Current Outpatient Medications   Medication Sig Dispense Refill     amLODIPine (NORVASC) 5 MG tablet Take 1 tablet (5 mg) by  "mouth daily 90 tablet 3     ascorbic acid 1000 MG TABS tablet Take 1,000 mg by mouth 2 times daily       Cholecalciferol (VITAMIN D PO) Take 2,500 Units by mouth daily 2300 IU daily       Magnesium 400 MG CAPS Take 1 capsule by mouth every evening Magnesium Glycinate       Multiple vitamin  s TABS Take 1 tablet by mouth daily.       Omega-3 Fatty Acids (SALMON OIL-1000 PO) Take 1 capsule by mouth 2 times daily.       OS-REY ULTRA 600 MG OR TABS 1 TABLET TWICE DAILY       OSTEO BI-FLEX ADV TRIPLE ST OR TABS 1 po bid       rivaroxaban ANTICOAGULANT (XARELTO ANTICOAGULANT) 15 MG TABS tablet Take 1 tablet (15 mg) by mouth 2 times daily (with meals) 42 tablet 0     rivaroxaban ANTICOAGULANT (XARELTO ANTICOAGULANT) 20 MG TABS tablet Take 1 tablet (20 mg) by mouth daily (with dinner) 90 tablet 3     triamcinolone (KENALOG) 0.1 % external cream Apply topically daily as needed for irritation (Feet.)       vitamin B complex with vitamin C (VITAMIN  B COMPLEX) tablet Take 1 tablet by mouth daily       warfarin ANTICOAGULANT (COUMADIN) 5 MG tablet Take 1 tablet (5 mg) by mouth daily 30 tablet 0     co-enzyme Q-10 50 MG CAPS        enoxaparin ANTICOAGULANT (LOVENOX) 80 MG/0.8ML syringe Inject 0.8 mLs (80 mg) Subcutaneous every 12 hours for 7 days 11.2 mL 1     Allergies   Allergen Reactions     Cat Hair [Cats]      Pneumococcal Vaccine Rash     FAMILY HISTORY NOTED AND REVIEWED    REVIEW OF SYSTEMS: above    PHYSICAL EXAM    /79 (BP Location: Left arm, Patient Position: Chair, Cuff Size: Adult Regular)   Pulse 71   Temp 97  F (36.1  C) (Oral)   Ht 1.727 m (5' 8\")   Wt 78 kg (172 lb)   SpO2 97%   BMI 26.15 kg/m      Patient appears non toxic  Mouth - tongue midline and within normal limits, mucous membranes and posterior pharynx within normal limits, no lesions seen.  Neck - no masses, lesions or tenderness  Nodes - no supraclavicular, cervical or axially adenopathy .  Lungs - clear, normal flow  Cardiovascular - " regular rate and rhythm, no murmer, rub or gallop, no jvp carotids within normal limits, no bruits.  Abdomen - normal active bowel sounds, soft, non tender, no masses, guarding or rebound, no hepatosplenomegaly  He has bilateral lower extremity edema that is trace on the left and 1+ on the right.  There is some lower leg redness but no cords or masses.  Some varicose veins are present.  Nothing on the thighs.    Labs sent    ASSESSMENT:  1. 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  No signs of pulmonary embolis, distal clot.  He does have fair amt of leg edema.    2. Probable factor 5, to get labs today  3. Prior gi bleed  4. Unlikely to have underlying malig as cause of clot        PLAN:  I had a long discussion with the patient and his wife regarding multiple issues.  We discussed the concept of provoked versus unprovoked clot.  We discussed the concept of how long to treat.  We discussed the benefits and risks of anticoagulation as well as the risk of bleeding including GI bleeding even if he does everything correct.  We discussed that he cannot take aspirin or aspirin products including NSAIDs.  We discussed that if he is unsteady we need to reconsider her decision.  We also discussed which type of anticoagulation would be the best.    At this time is my recommendation that he go on lifelong anticoagulation given that this is the second clot.  I will check a factor V Leyden test as well today.  In terms of which anticoagulation it was my recommendation based on slightly lower risk of bleeding that we use 1 of the newer agents.  The patient is aware this will be more costly but would like to do that.  I discussed how to transition to this as well.    The patient will get labs today.  His hemoglobin was slightly low in the ER and certainly we will check that.  He will call if any problems and follow-up with me in 4 weeks.    Hosea Lockett M.D.        Hosea Lockett,  M.D.

## 2020-02-26 NOTE — RESULT ENCOUNTER NOTE
It was a pleasure seeing you.  I wanted to get back to you with your test results.  I have enclosed a copy for your records.    Your hemoglobin is very stable as you can see.    If you have any questions please call me.    Hosea Lockett M.D.

## 2020-02-28 LAB — COPATH REPORT: NORMAL

## 2020-03-05 ENCOUNTER — TRANSFERRED RECORDS (OUTPATIENT)
Dept: HEALTH INFORMATION MANAGEMENT | Facility: CLINIC | Age: 85
End: 2020-03-05

## 2020-03-16 DIAGNOSIS — I10 BENIGN ESSENTIAL HYPERTENSION: ICD-10-CM

## 2020-03-16 RX ORDER — AMLODIPINE BESYLATE 5 MG/1
TABLET ORAL
Qty: 90 TABLET | Refills: 3 | Status: SHIPPED | OUTPATIENT
Start: 2020-03-16 | End: 2020-11-03

## 2020-03-16 NOTE — TELEPHONE ENCOUNTER
"Last Written Prescription Date:  4/8/19  Last Fill Quantity: 90,  # refills: 3   Last office visit: 2/25/2020 with prescribing provider:     Future Office Visit:   Next 5 appointments (look out 90 days)    Mar 24, 2020  1:30 PM CDT  [facility] H[facility] Lockett [facility] [address] [facility] [id number] [phone]         Requested Prescriptions   Pending Prescriptions Disp Refills     amLODIPine (NORVASC) 5 MG tablet [Pharmacy Med Name: AMLODIPINE 5 MG TAB 5 TAB] 90 tablet 3     Sig: TAKE 1 TABLET BY MOUTH ONCE DAILY       Calcium Channel Blockers Protocol  Passed - 3/16/2020 11:35 AM        Passed - Blood pressure under 140/90 in past 12 months     BP Readings from Last 3 Encounters:   02/25/20 135/79   02/22/20 (!) 148/68   02/22/20 130/70                 Passed - Recent (12 mo) or future (30 days) visit within the authorizing provider's specialty     Patient has had an office visit with the authorizing provider or a provider within the authorizing providers department within the previous 12 mos or has a future within next 30 days. See \"Patient Info\" tab in inbasket, or \"Choose Columns\" in Meds & Orders section of the refill encounter.              Passed - Medication is active on med list        Passed - Patient is age 18 or older        Passed - Normal serum creatinine on file in past 12 months     Recent Labs   Lab Test 02/22/20 2033   CR 1.01       Ok to refill medication if creatinine is low               "

## 2020-05-06 DIAGNOSIS — R21 RASH AND NONSPECIFIC SKIN ERUPTION: ICD-10-CM

## 2020-05-06 PROCEDURE — 86769 SARS-COV-2 COVID-19 ANTIBODY: CPT | Mod: 90 | Performed by: INTERNAL MEDICINE

## 2020-05-06 PROCEDURE — 99000 SPECIMEN HANDLING OFFICE-LAB: CPT | Performed by: INTERNAL MEDICINE

## 2020-05-06 PROCEDURE — 36415 COLL VENOUS BLD VENIPUNCTURE: CPT | Performed by: INTERNAL MEDICINE

## 2020-05-07 LAB
COVID-19 SPIKE RBD ABY TITER: NORMAL
COVID-19 SPIKE RBD ABY: NEGATIVE

## 2020-05-08 NOTE — RESULT ENCOUNTER NOTE
Hello,    Your antibody test is negative which likely, but not for sure, indicates no prior covid.    Let me know if you have questions.    Hosea Lockett M.D.

## 2020-05-12 ENCOUNTER — TRANSFERRED RECORDS (OUTPATIENT)
Dept: HEALTH INFORMATION MANAGEMENT | Facility: CLINIC | Age: 85
End: 2020-05-12

## 2020-05-27 ENCOUNTER — TRANSFERRED RECORDS (OUTPATIENT)
Dept: HEALTH INFORMATION MANAGEMENT | Facility: CLINIC | Age: 85
End: 2020-05-27

## 2020-07-27 ENCOUNTER — VIRTUAL VISIT (OUTPATIENT)
Dept: FAMILY MEDICINE | Facility: CLINIC | Age: 85
End: 2020-07-27
Payer: COMMERCIAL

## 2020-07-27 DIAGNOSIS — R60.0 BILATERAL LEG EDEMA: ICD-10-CM

## 2020-07-27 DIAGNOSIS — I10 BENIGN ESSENTIAL HYPERTENSION: Primary | ICD-10-CM

## 2020-07-27 DIAGNOSIS — I82.4Z1 DVT, LOWER EXTREMITY, DISTAL, ACUTE, RIGHT (H): ICD-10-CM

## 2020-07-27 PROCEDURE — 99214 OFFICE O/P EST MOD 30 MIN: CPT | Mod: 95 | Performed by: INTERNAL MEDICINE

## 2020-07-27 RX ORDER — LOSARTAN POTASSIUM 100 MG/1
100 TABLET ORAL DAILY
Qty: 90 TABLET | Refills: 3 | Status: ON HOLD | OUTPATIENT
Start: 2020-07-27 | End: 2021-06-14

## 2020-07-27 NOTE — PROGRESS NOTES
"Jose Ray is a 85 year old male who is being evaluated via a billable video visit.      The patient has been notified of following:     \"This video visit will be conducted via a call between you and your physician/provider. We have found that certain health care needs can be provided without the need for an in-person physical exam.  This service lets us provide the care you need with a video conversation.  If a prescription is necessary we can send it directly to your pharmacy.  If lab work is needed we can place an order for that and you can then stop by our lab to have the test done at a later time.    Video visits are billed at different rates depending on your insurance coverage.  Please reach out to your insurance provider with any questions.    If during the course of the call the physician/provider feels a video visit is not appropriate, you will not be charged for this service.\"    Patient has given verbal consent for Video visit? Yes  How would you like to obtain your AVS? MyChart  If you are dropped from the video visit, the video invite should be resent to: Text to cell phone: [phone]  Will anyone else be joining your video visit? No      Subjective     Jose Ray is a 85 year old male who presents today via video visit for the following health issues:    HPI    Video Start Time: 9:26 AM    The patient notes bilat edema over the last 4 weeks, also rash on one foot.  He notes it mostly feet and ankles and also in the legs.  His blood pressure today was as noted.  His weight has gone up about 5 pounds from his baseline.  He does not recall this before.  He is on xarelto and has been taking it regularly.  He is also on norvasc.      He otherwise feels fine.  No chest pain or shortness of breath.  No abdomen pain or n/v, bowel movement fine.  Urine has been normal.  No fever.          Physical Exam     GENERAL: Healthy, alert and no distress  EYES: Eyes grossly normal to inspection.  No " discharge or erythema, or obvious scleral/conjunctival abnormalities.  RESP: No audible wheeze, cough, or visible cyanosis.  No visible retractions or increased work of breathing.    SKIN: Visible skin clear. No significant rash, abnormal pigmentation or lesions.  NEURO: Cranial nerves grossly intact.  Mentation and speech appropriate for age.  PSYCH: Mentation appears normal, affect normal/bright, judgement and insight intact, normal speech and appearance well-groomed.  Hard to see legs due to quality of video    ASSESSMENT:  1 bilat edema, suspect heat, norvasc, age, doubt dvt, chf, thyroid, hepatic or renal  2. Hypertension,l control fair  3. Prior dvt, on xarelto    PLAN:  Labs in clinic  Discontinue norvasc  Add losartan 100mg  Call if worsens, chest pain or shortness of breath  Weight's   Follow up 2 weeks or sooner prn  Will discuss anticoag then    Hosea Lockett M.D.      Video-Visit Details    Type of service:  Video Visit    Video End Time:9:43 AM    Originating Location (pt. Location): Home    Distant Location (provider location):  [facility]     Platform used for Video Visit: Doximity    No follow-ups on file.       Hosea Lockett MD

## 2020-07-31 DIAGNOSIS — R60.0 BILATERAL LEG EDEMA: ICD-10-CM

## 2020-07-31 DIAGNOSIS — I10 BENIGN ESSENTIAL HYPERTENSION: ICD-10-CM

## 2020-07-31 LAB
ALBUMIN UR-MCNC: NEGATIVE MG/DL
APPEARANCE UR: CLEAR
BACTERIA #/AREA URNS HPF: ABNORMAL /HPF
BILIRUB UR QL STRIP: NEGATIVE
COLOR UR AUTO: YELLOW
ERYTHROCYTE [DISTWIDTH] IN BLOOD BY AUTOMATED COUNT: 12.9 % (ref 10–15)
GLUCOSE UR STRIP-MCNC: NEGATIVE MG/DL
HCT VFR BLD AUTO: 41.9 % (ref 40–53)
HGB BLD-MCNC: 14.2 G/DL (ref 13.3–17.7)
HGB UR QL STRIP: ABNORMAL
KETONES UR STRIP-MCNC: NEGATIVE MG/DL
LEUKOCYTE ESTERASE UR QL STRIP: NEGATIVE
MCH RBC QN AUTO: 33.1 PG (ref 26.5–33)
MCHC RBC AUTO-ENTMCNC: 33.9 G/DL (ref 31.5–36.5)
MCV RBC AUTO: 98 FL (ref 78–100)
NITRATE UR QL: NEGATIVE
NON-SQ EPI CELLS #/AREA URNS LPF: ABNORMAL /LPF
PH UR STRIP: 5.5 PH (ref 5–7)
PLATELET # BLD AUTO: 240 10E9/L (ref 150–450)
RBC # BLD AUTO: 4.29 10E12/L (ref 4.4–5.9)
RBC #/AREA URNS AUTO: ABNORMAL /HPF
SOURCE: ABNORMAL
SP GR UR STRIP: >1.03 (ref 1–1.03)
UROBILINOGEN UR STRIP-ACNC: 0.2 EU/DL (ref 0.2–1)
WBC # BLD AUTO: 6.4 10E9/L (ref 4–11)
WBC #/AREA URNS AUTO: ABNORMAL /HPF

## 2020-07-31 PROCEDURE — 36415 COLL VENOUS BLD VENIPUNCTURE: CPT | Performed by: INTERNAL MEDICINE

## 2020-07-31 PROCEDURE — 84443 ASSAY THYROID STIM HORMONE: CPT | Performed by: INTERNAL MEDICINE

## 2020-07-31 PROCEDURE — 85027 COMPLETE CBC AUTOMATED: CPT | Performed by: INTERNAL MEDICINE

## 2020-07-31 PROCEDURE — 81001 URINALYSIS AUTO W/SCOPE: CPT | Performed by: INTERNAL MEDICINE

## 2020-07-31 PROCEDURE — 80053 COMPREHEN METABOLIC PANEL: CPT | Performed by: INTERNAL MEDICINE

## 2020-08-01 LAB
ALBUMIN SERPL-MCNC: 3.7 G/DL (ref 3.4–5)
ALP SERPL-CCNC: 63 U/L (ref 40–150)
ALT SERPL W P-5'-P-CCNC: 18 U/L (ref 0–70)
ANION GAP SERPL CALCULATED.3IONS-SCNC: 8 MMOL/L (ref 3–14)
AST SERPL W P-5'-P-CCNC: 9 U/L (ref 0–45)
BILIRUB SERPL-MCNC: 0.9 MG/DL (ref 0.2–1.3)
BUN SERPL-MCNC: 24 MG/DL (ref 7–30)
CALCIUM SERPL-MCNC: 8.4 MG/DL (ref 8.5–10.1)
CHLORIDE SERPL-SCNC: 110 MMOL/L (ref 94–109)
CO2 SERPL-SCNC: 22 MMOL/L (ref 20–32)
CREAT SERPL-MCNC: 1 MG/DL (ref 0.66–1.25)
GFR SERPL CREATININE-BSD FRML MDRD: 69 ML/MIN/{1.73_M2}
GLUCOSE SERPL-MCNC: 101 MG/DL (ref 70–99)
POTASSIUM SERPL-SCNC: 4.3 MMOL/L (ref 3.4–5.3)
PROT SERPL-MCNC: 7.2 G/DL (ref 6.8–8.8)
SODIUM SERPL-SCNC: 140 MMOL/L (ref 133–144)
TSH SERPL DL<=0.005 MIU/L-ACNC: 0.78 MU/L (ref 0.4–4)

## 2020-08-02 NOTE — RESULT ENCOUNTER NOTE
Enclosed are the labs for your review.  For the most part they look very good.  Your blood salts, kidney tests, liver tests, blood count, and thyroid tests are all normal.  Your urine has a microscopic amount of red blood cells in it.  I am not concerned but I will want to follow-up on this and repeat it in the near future.    Please be sure to follow-up with me in the next 1 to 2 weeks.  If you have any questions let me know.    Hosea Lockett M.D.

## 2020-11-02 ENCOUNTER — NURSE TRIAGE (OUTPATIENT)
Dept: NURSING | Facility: CLINIC | Age: 85
End: 2020-11-02

## 2020-11-03 ENCOUNTER — HOSPITAL ENCOUNTER (EMERGENCY)
Facility: CLINIC | Age: 85
Discharge: HOME | End: 2020-11-03
Attending: EMERGENCY MEDICINE | Admitting: EMERGENCY MEDICINE
Payer: COMMERCIAL

## 2020-11-03 ENCOUNTER — MYC MEDICAL ADVICE (OUTPATIENT)
Dept: FAMILY MEDICINE | Facility: CLINIC | Age: 85
End: 2020-11-03

## 2020-11-03 VITALS
OXYGEN SATURATION: 100 % | WEIGHT: 170 LBS | HEIGHT: 69 IN | HEART RATE: 64 BPM | BODY MASS INDEX: 25.18 KG/M2 | RESPIRATION RATE: 16 BRPM | TEMPERATURE: 98 F | SYSTOLIC BLOOD PRESSURE: 172 MMHG | DIASTOLIC BLOOD PRESSURE: 98 MMHG

## 2020-11-03 DIAGNOSIS — I10 BENIGN ESSENTIAL HYPERTENSION: ICD-10-CM

## 2020-11-03 PROCEDURE — 99282 EMERGENCY DEPT VISIT SF MDM: CPT

## 2020-11-03 ASSESSMENT — MIFFLIN-ST. JEOR: SCORE: 1451.49

## 2020-11-03 ASSESSMENT — ENCOUNTER SYMPTOMS
SHORTNESS OF BREATH: 0
FATIGUE: 0
HEADACHES: 1

## 2020-11-03 NOTE — ED AVS SNAPSHOT
[facility]  [address] [address]  Phone: [phone]  Fax: [phone]                                    Jose Ray   MRN: [id number]    Department: [facility] Emergency Dept   Date of Visit: 11/3/2020           After Visit Summary Signature Page    I have received my discharge instructions, and my questions have been answered. I have discussed any challenges I see with this plan with the nurse or doctor.    ..........................................................................................................................................  Patient/Patient Representative Signature      ..........................................................................................................................................  Patient Representative Print Name and Relationship to Patient    ..................................................               ................................................  Date                                   Time    ..........................................................................................................................................  Reviewed by Signature/Title    ...................................................              ..............................................  Date                                               Time          22EPIC Rev 08/18

## 2020-11-03 NOTE — ED TRIAGE NOTES
"Worried about \"my erratic blood pressure.  It is fluctuating from 170 which is the top number, to 130 all day.  My wife is a nurse and says there is something wrong.\"    Addendum: pt takes losartan in the mornings and reports headache today - took tylenol prior to arrival in ED which relieved headache  "

## 2020-11-03 NOTE — ED PROVIDER NOTES
History     Chief Complaint:  Hypertension    HPI  Jose Ray is a 85 year old male on Xarelto with a history of HTN and DVT who presents for evaluation of hypertension. The patient took his blood pressure yesterday morning which was low and he contacted his doctor but then it became high later in the evening and so his wife wanted him to come in. Pressure on arrival was 170/86. The patient had a headache which resolved with Tylenol and his pressure was at it's highest level while he was having this pain. He recently traveled to [place] and his wife notes that his blood pressure fluctuated with activity and dehydration. His wife notes that the patient's blood pressure has run low for most of his life.       Allergies:  Pneumococcal Vaccine    Medications:    Ascorbic Acid  Losartan  Xarelto    Past Medical History:    Adenomatous colon polyp  Complex renal cyst  Diverticular hemorrhage  DVT  Eczema  Gallstone  Generalized osteoarthrosis  HTN   Hydronephrosis of left kidney  Nephrolithiasis  Prostate cancer   Skin cancer  Thrombophlebitis    Past Surgical History:    Mohs surgery  TURP  Circumcision  Hernia repair   Vascular surgery    Family History:    Arthritis in his mother  Asthma in his father  Cancer in his father  Deep Vein Thrombosis in his mother  Gastrointestinal Disease in his mother  Hypertension in his mother    Social History:  The patient arrives with his wife  Smoking: never smoker  Alcohol use: yes, occasional   PCP: Hosea Lockett  Marital Status:  [2]      Review of Systems   Constitutional: Negative for fatigue.   Respiratory: Negative for shortness of breath.    Cardiovascular: Positive for leg swelling. Negative for chest pain.   Neurological: Positive for headaches (resolved).   All other systems reviewed and are negative.      Physical Exam     Patient Vitals for the past 24 hrs:   BP Temp Temp src Pulse Resp SpO2 Height Weight   11/03/20 0155 (!) 172/98 -- -- 64 -- 100 %  "-- --   11/03/20 0138 (!) 170/86 98  F (36.7  C) Oral 65 16 99 % 1.753 m (5' 9\") 77.1 kg (170 lb)     Physical Exam  Eye:  Pupils are equal, round, and reactive.  Extraocular movements intact.    ENT:  No rhinorrhea.  Moist mucus membranes.  Normal tongue and tonsil.    Cardiac:  Regular rate and rhythm.  No murmurs, gallops, or rubs.    Pulmonary:  Clear to auscultation bilaterally.  No wheezes, rales, or rhonchi.    Abdomen:  Positive bowel sounds.  Abdomen is soft and non-distended, without focal tenderness.    Musculoskeletal:  Normal movement of all extremities without evidence for deficit.    Skin:  Warm and dry without rashes.    Neurologic:  CN II - XII intact.  5/5 strength in all extremities.  Normal sensation throughout.  Normal finger to nose and heel to shin.  2+ patellar reflexes.  Normal gait.    Psychiatric:  Normal affect with appropriate interaction with examiner.    Emergency Department Course     Emergency Department Course:  Past medical records, nursing notes, and vitals reviewed.  0150: I performed an exam of the patient and obtained history, as documented above.     We discussed the plan for the patient to keep a blood pressure diary for his primary doctor     Findings and plan explained to the Patient. Patient discharged home with instructions regarding supportive care, medications, and reasons to return. The importance of close follow-up was reviewed.   Impression & Plan      Medical Decision Making:  Jose Ray is a 85 year old male who presents to the emergency department today for evaluation of erratic blood pressures.  The patient notes that he has been on 100 mg of losartan in the morning for some time and this has generally controlled his blood pressure well.  However, he has had spells with a blood pressure seems to read higher, typically in the evenings.  However, he will have some spells of lower blood pressure, especially in the morning or with exertion.  He describes " having intermittent headaches which is not necessarily new for him.  When he had a headache this evening, his wife checked his blood pressure and found it to be elevated.  Over several hours the blood pressure continued to be elevated, prompting their visit here.  He took some Tylenol for the headache which has completely resolved the headache.  He denied any associated vision changes, focal weakness or numbness, discoordination, confusion, or ataxia.    On my exam, the patient appears clinically very well.  His blood pressure is elevated at 172/98 but the remainder of his vital signs are reassuring.  A head to toe exam is completely unremarkable.  Neurologic exam is reassuring.  His last laboratory investigation was in late July which was all normal.  He has no history of renal insufficiency and I do not feel that he needs to have recheck of labs or EKG today considering his lack of any other symptoms.  This headache was fairly mild and is not a new phenomenon for him.  I do not feel he needs a head CT as the likelihood of this representing stroke or other more nefarious intracranial pathology is low.    I spoke with the patient at length about how to proceed.  My leading supposition is that he is on monotherapy with losartan only in the morning and he likely trends up in his blood pressure in the later hours.  I advised him to start checking his blood pressure 3 times a day to determine whether he is adequately controlled and to whether we need to split his losartan into 2 doses or add a second agent.  Nonetheless, I do not feel he requires other intervention in the ER and he and his wife are comfortable with this plan.  They will follow up with her primary doctor next week with immediate return to us for any worsening of condition or other emergent concerns.    Diagnosis:    ICD-10-CM   1. Benign essential hypertension  I10       Disposition:   Discharged to home.      Scribe Disclosure:  Melissa FISHER, am serving  as a scribe at 1:50 AM on 11/3/2020 to document services personally performed by Trierweiler, Chad A, MD based on my observations and the provider's statements to me.    [facility] EMERGENCY DEPT     Trierweiler, Chad A, MD  11/04/20 0503

## 2020-11-03 NOTE — TELEPHONE ENCOUNTER
"Pt called stated he was out of the town for two weeks, and he has been doing outside work more than usually.  Pt stated he has been having \"erratic\" blood pressure readings recently. Pt's wife has been taking pt blood pressure, at 3:25 pm BP was 100/56, later time was 167/84, and at 7:32 pm pt's blood pressure was 173/87, 186/96. RN asked pt to recheck his BP now, and it is 190/106. Pt is having headache.  Pt reported he doesn't drink adequate fluids, and reported drinking 26 oz of coffee and gatorade today. Pt is on Losartan 100 mg daily.    Per protocol pt was advised to go to the emergency department to be seen. Pt will go to [facility] emergency department.      José Miguel Grace RN  [facility]       Reason for Disposition    [1] Systolic BP  >= 160 OR Diastolic >= 100 AND [2] cardiac or neurologic symptoms (e.g., chest pain, difficulty breathing, unsteady gait, blurred vision)    Additional Information    Negative: Difficult to awaken or acting confused (e.g., disoriented, slurred speech)    Negative: Severe difficulty breathing (e.g., struggling for each breath, speaks in single words)    Negative: [1] Weakness of the face, arm or leg on one side of the body AND [2] new onset    Negative: [1] Numbness (i.e., loss of sensation) of the face, arm or leg on one side of the body AND [2] new onset    Negative: [1] Chest pain lasts > 5 minutes AND [2] history of heart disease  (i.e., heart attack, bypass surgery, angina, angioplasty, CHF)    Negative: [1] Chest pain AND [2] took nitrogylcerin AND [3] pain was not relieved    Negative: Sounds like a life-threatening emergency to the triager    Negative: Symptom is main concern  (e.g., headache, chest pain)    Negative: Low blood pressure is main concern    Protocols used: HIGH BLOOD PRESSURE-A-AH      "

## 2020-11-03 NOTE — TELEPHONE ENCOUNTER
Dr Lockett,  Please see below [employer] message and advise.  You don't have any openings this week or next.  Do you want pt to see team provider?  Thanks,  Lizeth MENDOZA RN

## 2020-11-04 RX ORDER — AMLODIPINE BESYLATE 2.5 MG/1
5 TABLET ORAL DAILY
Status: CANCELLED | OUTPATIENT
Start: 2020-11-04

## 2020-11-04 RX ORDER — AMLODIPINE BESYLATE 2.5 MG/1
2.5 TABLET ORAL DAILY
Qty: 90 TABLET | Refills: 3 | Status: SHIPPED | OUTPATIENT
Start: 2020-11-04 | End: 2020-11-30

## 2020-11-04 NOTE — TELEPHONE ENCOUNTER
TO PCP:     Discussed with patient     He is agreeable to this - asks if new 2.5mg tablet of Amlodipine can be sent to pharmacy - pended.     He said the ER doctor he saw recently said to ask PCP about possibly splitting the Losartan med. Now taking all 100mg in AM, asking if he should take 50mg BID instead     What time do you advise he take the Amlodipine medication?     Teresa TRUJILLO RN

## 2020-11-04 NOTE — TELEPHONE ENCOUNTER
Let's add back norvasc but just 2.5mg, continue losartan, monitor blood pressure, call if dizziness, chest pain or shortness of breath, headaches.  I am not worried about the high blood pressure readings.  Should follow up with me in 3 to 4 weeks, if no openings let me know    Hosea Lockett M.D.

## 2020-11-04 NOTE — TELEPHONE ENCOUNTER
See below BP updates    Pt asking if you would like to see him to follow up for appt? No available appts with you in the next couple of weeks    Please advise  Teresa TRUJILLO RN

## 2020-11-30 ENCOUNTER — OFFICE VISIT (OUTPATIENT)
Dept: FAMILY MEDICINE | Facility: CLINIC | Age: 85
End: 2020-11-30
Payer: COMMERCIAL

## 2020-11-30 VITALS
HEART RATE: 64 BPM | SYSTOLIC BLOOD PRESSURE: 131 MMHG | OXYGEN SATURATION: 98 % | WEIGHT: 173 LBS | HEIGHT: 69 IN | BODY MASS INDEX: 25.62 KG/M2 | DIASTOLIC BLOOD PRESSURE: 67 MMHG | TEMPERATURE: 97 F

## 2020-11-30 DIAGNOSIS — I10 BENIGN ESSENTIAL HYPERTENSION: Primary | ICD-10-CM

## 2020-11-30 DIAGNOSIS — C61 PROSTATE CANCER (H): ICD-10-CM

## 2020-11-30 DIAGNOSIS — I82.4Z1 DVT, LOWER EXTREMITY, DISTAL, ACUTE, RIGHT (H): ICD-10-CM

## 2020-11-30 DIAGNOSIS — R60.0 BILATERAL LEG EDEMA: ICD-10-CM

## 2020-11-30 PROCEDURE — 99214 OFFICE O/P EST MOD 30 MIN: CPT | Performed by: INTERNAL MEDICINE

## 2020-11-30 RX ORDER — HYDROCHLOROTHIAZIDE 12.5 MG/1
12.5 TABLET ORAL DAILY
Qty: 90 TABLET | Refills: 3 | Status: ON HOLD | OUTPATIENT
Start: 2020-11-30 | End: 2021-06-14

## 2020-11-30 ASSESSMENT — MIFFLIN-ST. JEOR: SCORE: 1465.1

## 2020-11-30 NOTE — PROGRESS NOTES
The patient presents for follow-up on several issues.    The patient has hypertension.  As noted, he was on amlodipine 5 mg but then developed edema.  His labs are all fine so I stopped that and added losartan.  The edema seemed to improve but then his blood pressure was fluctuating and he was actually in the ER in November as noted.  After that I added low-dose amlodipine at night, 2.5 mg.  With this his blood pressures been quite good.  With respect to the edema he still has some of that although not as bad as before.  As noted above the labs were fine.  No history of heart disease and no chest pain or shortness of breath.    The patient does have a prior DVT as noted.  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  He has no issues with that.    He does have a history of prostate cancer with regular follow-up with urology.  He has high cholesterol but an excellent HDL.    On review of systems is negative.    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, hosp fsd     DVT, lower extremity, distal, acute, right (H) 02/2020, 1996    gastrocnemius vein in 2020, not sure which vein in 1996, factor 5 leiden neg 2020     Eczema      Gallstone 2010    seen on ct for renal stone     Generalized osteoarthrosis, unspecified site      HTN (hypertension) 06/2014    us done and no hydro but renal cyst, had edema with norvasc 5mg     Hx of colonoscopy 2011    nl     Hydronephrosis of left kidney 2010    seen on above ct     Nephrolithiasis 2010    seen on ct, hydronephrosis seen as well     Prostate cancer (H) 2008    had surgery, D.r Michelle     Skin cancer, basal cell 2002, 2008    had xrt 2002, mohs surgery 2008     Thrombophlebitis 1985     Past Surgical History:    Procedure Laterality Date     CIRCUMCISION  3/12/2013    Procedure: CIRCUMCISION;  CIRCUMCISION;  Surgeon: Stevenson Martinez MD;  Location: [facility]  2011; 2019    Dr. Wills; [facility]     HERNIA REPAIR  1961    Hernia     mohs surgery  2008, 2011    basal cell     TURP  2008    prostate cancer     VASCULAR SURGERY  1985    vein stripping both legs some     ZZC NONSPECIFIC PROCEDURE  14, 36    HERNIORRHAPHY     ZZC NONSPECIFIC PROCEDURE  1985    VEIN STRIPPING     Social History     Socioeconomic History     Marital status:      Spouse name: Not on file     Number of children: 3     Years of education: Not on file     Highest education level: Not on file   Occupational History     Occupation: [employer]     Employer: RETIRED   Social Needs     Financial resource strain: Not on file     Food insecurity     Worry: Not on file     Inability: Not on file     Transportation needs     Medical: Not on file     Non-medical: Not on file   Tobacco Use     Smoking status: Never Smoker     Smokeless tobacco: Never Used   Substance and Sexual Activity     Alcohol use: Yes     Alcohol/week: 0.0 standard drinks     Comment: occasional     Drug use: No     Sexual activity: Not Currently     Partners: Male   Lifestyle     Physical activity     Days per week: Not on file     Minutes per session: Not on file     Stress: Not on file   Relationships     Social connections     Talks on phone: Not on file     Gets together: Not on file     Attends Rastafari service: Not on file     Active member of club or organization: Not on file     Attends meetings of clubs or organizations: Not on file     Relationship status: Not on file     Intimate partner violence     Fear of current or ex partner: Not on file     Emotionally abused: Not on file     Physically abused: Not on file     Forced sexual activity: Not on file   Other Topics Concern     Parent/sibling w/ CABG, MI or angioplasty before 65F 55M? No   Social  "History Narrative     Not on file     Current Outpatient Medications   Medication Sig Dispense Refill     ascorbic acid 1000 MG TABS tablet Take 1,000 mg by mouth 2 times daily       Cholecalciferol (VITAMIN D PO) Take 2,500 Units by mouth daily 2300 IU daily       dorzolamide (TRUSOPT) 2 % ophthalmic solution Place 1 drop into the right eye 3 times daily Prescribed by opthamologist       hydrochlorothiazide (HYDRODIURIL) 12.5 MG tablet Take 1 tablet (12.5 mg) by mouth daily 90 tablet 3     latanoprost (XALATAN) 0.005 % ophthalmic solution Place 1 drop into both eyes daily Ordered by opthamology       losartan (COZAAR) 100 MG tablet Take 1 tablet (100 mg) by mouth daily 90 tablet 3     Multiple vitamin  s TABS Take 1 tablet by mouth daily.       rivaroxaban ANTICOAGULANT (XARELTO ANTICOAGULANT) 20 MG TABS tablet Take 1 tablet (20 mg) by mouth daily (with dinner) 90 tablet 3     triamcinolone (KENALOG) 0.1 % external cream Apply topically daily as needed for irritation (Feet.)       Allergies   Allergen Reactions     Cat Hair [Cats]      Pneumococcal Vaccine Rash     FAMILY HISTORY NOTED AND REVIEWED    REVIEW OF SYSTEMS: above    PHYSICAL EXAM    /67 (BP Location: Left arm, Patient Position: Chair, Cuff Size: Adult Large)   Pulse 64   Temp 97  F (36.1  C) (Oral)   Ht 1.753 m (5' 9\")   Wt 78.5 kg (173 lb)   SpO2 98%   BMI 25.55 kg/m      Patient appears non toxic  Mouth - tongue midline and within normal limits, mucous membranes and posterior pharynx within normal limits, no lesions seen.  Neck - no masses, lesions or tenderness  Nodes - no supraclavicular, cervical or axially adenopathy .  Lungs - clear, normal flow  Cardiovascular - regular rate and rhythm, no murmer, rub or gallop, no jvp, 1+ bilat lower leg/ankle/pedal edema, carotids within normal limits, no bruits.  Abdomen - normal active bowel sounds, soft, non tender, no masses, guarding or rebound, no hepatosplenomegaly      Labs " noted    ASSESSMENT:  1. Hypertension, controlled, more edema with higher dose norvasc but still some now, some due to vv also  2. Edema, suspect due to norvasc, also venous statis, doubt chf, hepatic, thyroid, renal  3. Cap, follow up urol  4. Dvt, on xarelto    PLAN:  Stop norvasc  Add hydrochlorothiazide 12.5mg  Monitor blood pressure  Follow up 4 months or prn    Hosea Lockett M.D.

## 2020-11-30 NOTE — PATIENT INSTRUCTIONS
1. Stop the amlodipine    2. Start the new medicine called hydrochlorothiazide.  This should help the blood pressure and also improve the swelling    3. Monitor your blood pressure and let me know if it is not staying under 140/90.  Be sure to sit for 5 minutes prior to checking it    4. See me in 4 to 6 months    Hosea Lockett M.D.

## 2021-01-12 ENCOUNTER — TRANSFERRED RECORDS (OUTPATIENT)
Dept: HEALTH INFORMATION MANAGEMENT | Facility: CLINIC | Age: 86
End: 2021-01-12

## 2021-01-15 ENCOUNTER — HEALTH MAINTENANCE LETTER (OUTPATIENT)
Age: 86
End: 2021-01-15

## 2021-02-11 ENCOUNTER — MYC MEDICAL ADVICE (OUTPATIENT)
Dept: FAMILY MEDICINE | Facility: CLINIC | Age: 86
End: 2021-02-11

## 2021-03-02 ENCOUNTER — IMMUNIZATION (OUTPATIENT)
Dept: PEDIATRICS | Facility: CLINIC | Age: 86
End: 2021-03-02
Payer: COMMERCIAL

## 2021-03-02 PROCEDURE — 0001A PR COVID VAC PFIZER DIL RECON 30 MCG/0.3 ML IM: CPT

## 2021-03-02 PROCEDURE — 91300 PR COVID VAC PFIZER DIL RECON 30 MCG/0.3 ML IM: CPT

## 2021-03-23 ENCOUNTER — IMMUNIZATION (OUTPATIENT)
Dept: PEDIATRICS | Facility: CLINIC | Age: 86
End: 2021-03-23
Attending: INTERNAL MEDICINE
Payer: COMMERCIAL

## 2021-03-23 PROCEDURE — 91300 PR COVID VAC PFIZER DIL RECON 30 MCG/0.3 ML IM: CPT

## 2021-03-23 PROCEDURE — 0002A PR COVID VAC PFIZER DIL RECON 30 MCG/0.3 ML IM: CPT

## 2021-04-12 ENCOUNTER — OFFICE VISIT (OUTPATIENT)
Dept: FAMILY MEDICINE | Facility: CLINIC | Age: 86
End: 2021-04-12
Payer: COMMERCIAL

## 2021-04-12 VITALS
OXYGEN SATURATION: 98 % | WEIGHT: 172 LBS | BODY MASS INDEX: 25.48 KG/M2 | HEART RATE: 72 BPM | DIASTOLIC BLOOD PRESSURE: 86 MMHG | SYSTOLIC BLOOD PRESSURE: 148 MMHG | TEMPERATURE: 97 F | HEIGHT: 69 IN

## 2021-04-12 DIAGNOSIS — C61 PROSTATE CANCER (H): ICD-10-CM

## 2021-04-12 DIAGNOSIS — J34.89 RHINORRHEA: ICD-10-CM

## 2021-04-12 DIAGNOSIS — I82.4Z1 DVT, LOWER EXTREMITY, DISTAL, ACUTE, RIGHT (H): ICD-10-CM

## 2021-04-12 DIAGNOSIS — R60.0 BILATERAL LEG EDEMA: ICD-10-CM

## 2021-04-12 DIAGNOSIS — I10 BENIGN ESSENTIAL HYPERTENSION: Primary | ICD-10-CM

## 2021-04-12 LAB
ALBUMIN UR-MCNC: NEGATIVE MG/DL
APPEARANCE UR: CLEAR
BILIRUB UR QL STRIP: NEGATIVE
COLOR UR AUTO: YELLOW
GLUCOSE UR STRIP-MCNC: NEGATIVE MG/DL
HGB UR QL STRIP: NEGATIVE
KETONES UR STRIP-MCNC: NEGATIVE MG/DL
LEUKOCYTE ESTERASE UR QL STRIP: NEGATIVE
NITRATE UR QL: NEGATIVE
PH UR STRIP: 6 PH (ref 5–7)
SOURCE: NORMAL
SP GR UR STRIP: 1.02 (ref 1–1.03)
UROBILINOGEN UR STRIP-ACNC: 0.2 EU/DL (ref 0.2–1)

## 2021-04-12 PROCEDURE — 99213 OFFICE O/P EST LOW 20 MIN: CPT | Performed by: INTERNAL MEDICINE

## 2021-04-12 PROCEDURE — 36415 COLL VENOUS BLD VENIPUNCTURE: CPT | Performed by: INTERNAL MEDICINE

## 2021-04-12 PROCEDURE — 81003 URINALYSIS AUTO W/O SCOPE: CPT | Performed by: INTERNAL MEDICINE

## 2021-04-12 PROCEDURE — 80048 BASIC METABOLIC PNL TOTAL CA: CPT | Performed by: INTERNAL MEDICINE

## 2021-04-12 RX ORDER — IPRATROPIUM BROMIDE 42 UG/1
2 SPRAY, METERED NASAL
Qty: 15 ML | Refills: 1 | Status: SHIPPED | OUTPATIENT
Start: 2021-04-12 | End: 2021-09-16

## 2021-04-12 ASSESSMENT — MIFFLIN-ST. JEOR: SCORE: 1455.57

## 2021-04-12 NOTE — PROGRESS NOTES
The patient presents for follow-up to his hypertension.  As noted, he was on Norvasc but in November I stopped it even though was low-dose because he was having significant lower leg edema.  At that time I added hydrochlorothiazide and since then he notes the edema is significantly better.  His wife checks his blood pressure and its been fine.  He feels quite well with no chest pain, headaches or dizziness or breathing issues.    He has had a clear runny nose off and on for quite some time.    He has a history of prostate cancer without evidence of recurrence.  He has had a prior DVT and remains on Xarelto for that.    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, hosp fsd     DVT, lower extremity, distal, acute, right (H) 02/2020, 1996    gastrocnemius vein in 2020, not sure which vein in 1996, factor 5 leiden neg 2020     Eczema      Gallstone 2010    seen on ct for renal stone     Generalized osteoarthrosis, unspecified site      HTN (hypertension) 06/2014    us done and no hydro but renal cyst, had edema with norvasc 5mg, added hctz and stopped norvasc 11/20     Hx of colonoscopy 2011    nl     Hydronephrosis of left kidney 2010    seen on above ct     Nephrolithiasis 2010    seen on ct, hydronephrosis seen as well     Prostate cancer (H) 2008    had surgery, Zohra Martinez     Skin cancer, basal cell 2002, 2008    had xrt 2002, mohs surgery 2008     Thrombophlebitis 1985     Past Surgical History:   Procedure Laterality Date     CIRCUMCISION  3/12/2013    Procedure: CIRCUMCISION;  CIRCUMCISION;  Surgeon: Stevenson Martinez MD;  Location: [facility]     COLONOSCOPY  2011; 2019    Dr. Wills; [facility]     HERNIA REPAIR  1961    Hernia     mohs surgery  2008, 2011    basal cell     TURP  2008    prostate cancer      VASCULAR SURGERY  1985    vein stripping both legs some     [facility] NONSPECIFIC PROCEDURE  14, 36    HERNIORRHAPHY     [facility] NONSPECIFIC PROCEDURE  1985    VEIN STRIPPING     Social History     Socioeconomic History     Marital status:      Spouse name: Not on file     Number of children: 3     Years of education: Not on file     Highest education level: Not on file   Occupational History     Occupation: [employer]     Employer: RETIRED   Social Needs     Financial resource strain: Not on file     Food insecurity     Worry: Not on file     Inability: Not on file     Transportation needs     Medical: Not on file     Non-medical: Not on file   Tobacco Use     Smoking status: Never Smoker     Smokeless tobacco: Never Used   Substance and Sexual Activity     Alcohol use: Yes     Alcohol/week: 0.0 standard drinks     Comment: occasional     Drug use: No     Sexual activity: Not Currently     Partners: Male   Lifestyle     Physical activity     Days per week: Not on file     Minutes per session: Not on file     Stress: Not on file   Relationships     Social connections     Talks on phone: Not on file     Gets together: Not on file     Attends Hoahaoism service: Not on file     Active member of club or organization: Not on file     Attends meetings of clubs or organizations: Not on file     Relationship status: Not on file     Intimate partner violence     Fear of current or ex partner: Not on file     Emotionally abused: Not on file     Physically abused: Not on file     Forced sexual activity: Not on file   Other Topics Concern     Parent/sibling w/ CABG, MI or angioplasty before 65F 55M? No   Social History Narrative     Not on file     Current Outpatient Medications   Medication Sig Dispense Refill     ascorbic acid 1000 MG TABS tablet Take 1,000 mg by mouth 2 times daily       Cholecalciferol (VITAMIN D PO) Take 2,500 Units by mouth daily 2300 IU daily       dorzolamide (TRUSOPT) 2 % ophthalmic solution Place  "1 drop into the right eye 3 times daily Prescribed by opthamologist       hydrochlorothiazide (HYDRODIURIL) 12.5 MG tablet Take 1 tablet (12.5 mg) by mouth daily 90 tablet 3     ipratropium (ATROVENT) 0.06 % nasal spray Spray 2 sprays into both nostrils 2 times daily 15 mL 1     latanoprost (XALATAN) 0.005 % ophthalmic solution Place 1 drop into both eyes daily Ordered by opthamology       losartan (COZAAR) 100 MG tablet Take 1 tablet (100 mg) by mouth daily 90 tablet 3     Multiple vitamin  s TABS Take 1 tablet by mouth daily.       rivaroxaban ANTICOAGULANT (XARELTO ANTICOAGULANT) 20 MG TABS tablet Take 1 tablet (20 mg) by mouth daily (with dinner) 90 tablet 3     triamcinolone (KENALOG) 0.1 % external cream Apply topically daily as needed for irritation (Feet.)       Allergies   Allergen Reactions     Cat Hair [Cats]      Pneumococcal Vaccine Rash     FAMILY HISTORY NOTED AND REVIEWED    REVIEW OF SYSTEMS: above    PHYSICAL EXAM    BP (!) 148/86   Pulse 72   Temp 97  F (36.1  C) (Oral)   Ht 1.753 m (5' 9\")   Wt 78 kg (172 lb)   SpO2 98%   BMI 25.40 kg/m      Patient appears non toxic  Legs with trace to 1+ edema bilat, right more thann left, some vv presents in foot and ankle    ASSESSMENT:  1. Hypertension, controlled  2. Edema, improved, some due to vv and prior clot  3. Prior dvt, on noac  4. Cap, concepción  5. Rhinorrhea, clear, try atrovent    PLAN:  Labs  atrovent  Monitor blood pressure and if up call    Hosea Lockett M.D.        "

## 2021-04-12 NOTE — PATIENT INSTRUCTIONS
Try the atrovent nasal spray and see if this helps the runny nose.    Monitor your blood pressure and let me know if it is not around 140/90    Hosea Lockett M.D.

## 2021-04-13 LAB
ANION GAP SERPL CALCULATED.3IONS-SCNC: 4 MMOL/L (ref 3–14)
BUN SERPL-MCNC: 31 MG/DL (ref 7–30)
CALCIUM SERPL-MCNC: 8.4 MG/DL (ref 8.5–10.1)
CHLORIDE SERPL-SCNC: 109 MMOL/L (ref 94–109)
CO2 SERPL-SCNC: 26 MMOL/L (ref 20–32)
CREAT SERPL-MCNC: 1.19 MG/DL (ref 0.66–1.25)
GFR SERPL CREATININE-BSD FRML MDRD: 55 ML/MIN/{1.73_M2}
GLUCOSE SERPL-MCNC: 136 MG/DL (ref 70–99)
POTASSIUM SERPL-SCNC: 4.4 MMOL/L (ref 3.4–5.3)
SODIUM SERPL-SCNC: 139 MMOL/L (ref 133–144)

## 2021-04-14 NOTE — RESULT ENCOUNTER NOTE
It was a please seeing you.  You should be able to view your labs.    Your labs look just fine, your urine, blood salts and kidney tests.  I am not worried about the sugar as it was not fasting.    Hosea Lockett M.D.

## 2021-06-12 ENCOUNTER — APPOINTMENT (OUTPATIENT)
Dept: CT IMAGING | Facility: CLINIC | Age: 86
DRG: 661 | End: 2021-06-12
Attending: EMERGENCY MEDICINE
Payer: COMMERCIAL

## 2021-06-12 ENCOUNTER — HOSPITAL ENCOUNTER (INPATIENT)
Facility: CLINIC | Age: 86
LOS: 1 days | Discharge: HOME | DRG: 661 | End: 2021-06-14
Attending: EMERGENCY MEDICINE | Admitting: INTERNAL MEDICINE
Payer: COMMERCIAL

## 2021-06-12 DIAGNOSIS — N10 ACUTE PYELONEPHRITIS: Primary | ICD-10-CM

## 2021-06-12 DIAGNOSIS — R79.89 ELEVATED SERUM CREATININE: ICD-10-CM

## 2021-06-12 DIAGNOSIS — R19.4 DECREASED STOOLING: ICD-10-CM

## 2021-06-12 DIAGNOSIS — N20.1 RIGHT URETERAL STONE: ICD-10-CM

## 2021-06-12 LAB
ALBUMIN SERPL-MCNC: 3.6 G/DL (ref 3.4–5)
ALP SERPL-CCNC: 64 U/L (ref 40–150)
ALT SERPL W P-5'-P-CCNC: 16 U/L (ref 0–70)
ANION GAP SERPL CALCULATED.3IONS-SCNC: 10 MMOL/L (ref 3–14)
AST SERPL W P-5'-P-CCNC: 9 U/L (ref 0–45)
BASOPHILS # BLD AUTO: 0 10E9/L (ref 0–0.2)
BASOPHILS NFR BLD AUTO: 0.3 %
BILIRUB SERPL-MCNC: 1.8 MG/DL (ref 0.2–1.3)
BUN SERPL-MCNC: 45 MG/DL (ref 7–30)
CALCIUM SERPL-MCNC: 9.6 MG/DL (ref 8.5–10.1)
CHLORIDE SERPL-SCNC: 105 MMOL/L (ref 94–109)
CO2 SERPL-SCNC: 21 MMOL/L (ref 20–32)
CREAT SERPL-MCNC: 2.85 MG/DL (ref 0.66–1.25)
DIFFERENTIAL METHOD BLD: ABNORMAL
EOSINOPHIL # BLD AUTO: 0.1 10E9/L (ref 0–0.7)
EOSINOPHIL NFR BLD AUTO: 0.4 %
ERYTHROCYTE [DISTWIDTH] IN BLOOD BY AUTOMATED COUNT: 12.4 % (ref 10–15)
GFR SERPL CREATININE-BSD FRML MDRD: 19 ML/MIN/{1.73_M2}
GLUCOSE SERPL-MCNC: 151 MG/DL (ref 70–99)
HCT VFR BLD AUTO: 43.2 % (ref 40–53)
HGB BLD-MCNC: 14.3 G/DL (ref 13.3–17.7)
IMM GRANULOCYTES # BLD: 0.1 10E9/L (ref 0–0.4)
IMM GRANULOCYTES NFR BLD: 0.6 %
LIPASE SERPL-CCNC: 92 U/L (ref 73–393)
LYMPHOCYTES # BLD AUTO: 1.1 10E9/L (ref 0.8–5.3)
LYMPHOCYTES NFR BLD AUTO: 8.1 %
MCH RBC QN AUTO: 32.1 PG (ref 26.5–33)
MCHC RBC AUTO-ENTMCNC: 33.1 G/DL (ref 31.5–36.5)
MCV RBC AUTO: 97 FL (ref 78–100)
MONOCYTES # BLD AUTO: 1.5 10E9/L (ref 0–1.3)
MONOCYTES NFR BLD AUTO: 10.9 %
NEUTROPHILS # BLD AUTO: 10.8 10E9/L (ref 1.6–8.3)
NEUTROPHILS NFR BLD AUTO: 79.7 %
NRBC # BLD AUTO: 0 10*3/UL
NRBC BLD AUTO-RTO: 0 /100
PLATELET # BLD AUTO: 234 10E9/L (ref 150–450)
POTASSIUM SERPL-SCNC: 3.9 MMOL/L (ref 3.4–5.3)
PROT SERPL-MCNC: 7.6 G/DL (ref 6.8–8.8)
RBC # BLD AUTO: 4.46 10E12/L (ref 4.4–5.9)
SODIUM SERPL-SCNC: 136 MMOL/L (ref 133–144)
WBC # BLD AUTO: 13.5 10E9/L (ref 4–11)

## 2021-06-12 PROCEDURE — 250N000009 HC RX 250: Performed by: EMERGENCY MEDICINE

## 2021-06-12 PROCEDURE — C9803 HOPD COVID-19 SPEC COLLECT: HCPCS

## 2021-06-12 PROCEDURE — 80053 COMPREHEN METABOLIC PANEL: CPT | Performed by: EMERGENCY MEDICINE

## 2021-06-12 PROCEDURE — 85025 COMPLETE CBC W/AUTO DIFF WBC: CPT | Performed by: EMERGENCY MEDICINE

## 2021-06-12 PROCEDURE — 81001 URINALYSIS AUTO W/SCOPE: CPT | Performed by: EMERGENCY MEDICINE

## 2021-06-12 PROCEDURE — 83690 ASSAY OF LIPASE: CPT | Performed by: EMERGENCY MEDICINE

## 2021-06-12 PROCEDURE — 74177 CT ABD & PELVIS W/CONTRAST: CPT

## 2021-06-12 PROCEDURE — 250N000011 HC RX IP 250 OP 636: Performed by: EMERGENCY MEDICINE

## 2021-06-12 PROCEDURE — 99285 EMERGENCY DEPT VISIT HI MDM: CPT | Mod: 25

## 2021-06-12 RX ORDER — IOPAMIDOL 755 MG/ML
86 INJECTION, SOLUTION INTRAVASCULAR ONCE
Status: COMPLETED | OUTPATIENT
Start: 2021-06-12 | End: 2021-06-12

## 2021-06-12 RX ADMIN — SODIUM CHLORIDE 65 ML: 9 INJECTION, SOLUTION INTRAVENOUS at 23:09

## 2021-06-12 RX ADMIN — IOPAMIDOL 86 ML: 755 INJECTION, SOLUTION INTRAVENOUS at 23:09

## 2021-06-12 ASSESSMENT — ENCOUNTER SYMPTOMS
FEVER: 0
NAUSEA: 0
CONSTIPATION: 1
VOMITING: 0
ABDOMINAL PAIN: 1
HEMATURIA: 0

## 2021-06-13 ENCOUNTER — ANESTHESIA (OUTPATIENT)
Dept: SURGERY | Facility: CLINIC | Age: 86
DRG: 661 | End: 2021-06-13
Payer: COMMERCIAL

## 2021-06-13 ENCOUNTER — APPOINTMENT (OUTPATIENT)
Dept: CT IMAGING | Facility: CLINIC | Age: 86
DRG: 661 | End: 2021-06-13
Attending: UROLOGY
Payer: COMMERCIAL

## 2021-06-13 ENCOUNTER — ANESTHESIA EVENT (OUTPATIENT)
Dept: SURGERY | Facility: CLINIC | Age: 86
DRG: 661 | End: 2021-06-13
Payer: COMMERCIAL

## 2021-06-13 ENCOUNTER — APPOINTMENT (OUTPATIENT)
Dept: GENERAL RADIOLOGY | Facility: CLINIC | Age: 86
DRG: 661 | End: 2021-06-13
Attending: INTERNAL MEDICINE
Payer: COMMERCIAL

## 2021-06-13 PROBLEM — R79.89 ELEVATED SERUM CREATININE: Status: ACTIVE | Noted: 2021-06-13

## 2021-06-13 PROBLEM — N20.1 RIGHT URETERAL STONE: Status: ACTIVE | Noted: 2021-06-13

## 2021-06-13 PROBLEM — R19.4 DECREASED STOOLING: Status: ACTIVE | Noted: 2021-06-13

## 2021-06-13 LAB
ALBUMIN UR-MCNC: 10 MG/DL
ALBUMIN UR-MCNC: NEGATIVE MG/DL
APPEARANCE UR: ABNORMAL
APPEARANCE UR: CLEAR
BACTERIA #/AREA URNS HPF: ABNORMAL /HPF
BILIRUB UR QL STRIP: NEGATIVE
BILIRUB UR QL STRIP: NEGATIVE
COLOR UR AUTO: ABNORMAL
COLOR UR AUTO: YELLOW
GLUCOSE UR STRIP-MCNC: NEGATIVE MG/DL
GLUCOSE UR STRIP-MCNC: NEGATIVE MG/DL
HGB UR QL STRIP: ABNORMAL
HGB UR QL STRIP: ABNORMAL
KETONES UR STRIP-MCNC: 20 MG/DL
KETONES UR STRIP-MCNC: NEGATIVE MG/DL
LABORATORY COMMENT REPORT: NORMAL
LACTATE BLD-SCNC: 1.1 MMOL/L (ref 0.7–2)
LEUKOCYTE ESTERASE UR QL STRIP: ABNORMAL
LEUKOCYTE ESTERASE UR QL STRIP: NEGATIVE
MUCOUS THREADS #/AREA URNS LPF: PRESENT /LPF
MUCOUS THREADS #/AREA URNS LPF: PRESENT /LPF
NITRATE UR QL: NEGATIVE
NITRATE UR QL: NEGATIVE
PH UR STRIP: 5 PH (ref 5–7)
PH UR STRIP: 5.5 PH (ref 5–7)
RBC #/AREA URNS AUTO: 17 /HPF (ref 0–2)
RBC #/AREA URNS AUTO: >182 /HPF (ref 0–2)
SARS-COV-2 RNA RESP QL NAA+PROBE: NEGATIVE
SOURCE: ABNORMAL
SOURCE: ABNORMAL
SP GR UR STRIP: 1 (ref 1–1.03)
SP GR UR STRIP: 1.03 (ref 1–1.03)
SPECIMEN SOURCE: NORMAL
SQUAMOUS #/AREA URNS AUTO: 0 /HPF (ref 0–1)
SQUAMOUS #/AREA URNS AUTO: <1 /HPF (ref 0–1)
UROBILINOGEN UR STRIP-MCNC: 0 MG/DL (ref 0–2)
UROBILINOGEN UR STRIP-MCNC: 0 MG/DL (ref 0–2)
WBC #/AREA URNS AUTO: 14 /HPF (ref 0–5)
WBC #/AREA URNS AUTO: 3 /HPF (ref 0–5)
YEAST #/AREA URNS HPF: ABNORMAL /HPF

## 2021-06-13 PROCEDURE — 250N000009 HC RX 250: Performed by: UROLOGY

## 2021-06-13 PROCEDURE — 72192 CT PELVIS W/O DYE: CPT

## 2021-06-13 PROCEDURE — 0T768DZ DILATION OF RIGHT URETER WITH INTRALUMINAL DEVICE, VIA NATURAL OR ARTIFICIAL OPENING ENDOSCOPIC: ICD-10-PCS | Performed by: UROLOGY

## 2021-06-13 PROCEDURE — 120N000001 HC R&B MED SURG/OB

## 2021-06-13 PROCEDURE — 96360 HYDRATION IV INFUSION INIT: CPT

## 2021-06-13 PROCEDURE — 250N000025 HC SEVOFLURANE, PER MIN: Performed by: UROLOGY

## 2021-06-13 PROCEDURE — 250N000013 HC RX MED GY IP 250 OP 250 PS 637: Performed by: UROLOGY

## 2021-06-13 PROCEDURE — 99207 PR NO CHARGE LOS: CPT | Performed by: INTERNAL MEDICINE

## 2021-06-13 PROCEDURE — 258N000003 HC RX IP 258 OP 636: Performed by: UROLOGY

## 2021-06-13 PROCEDURE — 370N000017 HC ANESTHESIA TECHNICAL FEE, PER MIN: Performed by: UROLOGY

## 2021-06-13 PROCEDURE — 87635 SARS-COV-2 COVID-19 AMP PRB: CPT | Performed by: INTERNAL MEDICINE

## 2021-06-13 PROCEDURE — 99223 1ST HOSP IP/OBS HIGH 75: CPT | Mod: AI | Performed by: INTERNAL MEDICINE

## 2021-06-13 PROCEDURE — 250N000009 HC RX 250: Performed by: NURSE ANESTHETIST, CERTIFIED REGISTERED

## 2021-06-13 PROCEDURE — 999N000179 XR SURGERY CARM FLUORO LESS THAN 5 MIN W STILLS

## 2021-06-13 PROCEDURE — C1894 INTRO/SHEATH, NON-LASER: HCPCS | Performed by: UROLOGY

## 2021-06-13 PROCEDURE — 74420 UROGRAPHY RTRGR +-KUB: CPT | Mod: 26 | Performed by: UROLOGY

## 2021-06-13 PROCEDURE — 999N000141 HC STATISTIC PRE-PROCEDURE NURSING ASSESSMENT: Performed by: UROLOGY

## 2021-06-13 PROCEDURE — 250N000013 HC RX MED GY IP 250 OP 250 PS 637: Performed by: INTERNAL MEDICINE

## 2021-06-13 PROCEDURE — 710N000009 HC RECOVERY PHASE 1, LEVEL 1, PER MIN: Performed by: UROLOGY

## 2021-06-13 PROCEDURE — 250N000011 HC RX IP 250 OP 636: Performed by: INTERNAL MEDICINE

## 2021-06-13 PROCEDURE — 272N000001 HC OR GENERAL SUPPLY STERILE: Performed by: UROLOGY

## 2021-06-13 PROCEDURE — 81001 URINALYSIS AUTO W/SCOPE: CPT | Performed by: UROLOGY

## 2021-06-13 PROCEDURE — 258N000003 HC RX IP 258 OP 636: Performed by: EMERGENCY MEDICINE

## 2021-06-13 PROCEDURE — C2617 STENT, NON-COR, TEM W/O DEL: HCPCS | Performed by: UROLOGY

## 2021-06-13 PROCEDURE — 83605 ASSAY OF LACTIC ACID: CPT | Performed by: INTERNAL MEDICINE

## 2021-06-13 PROCEDURE — 52332 CYSTOSCOPY AND TREATMENT: CPT | Mod: RT | Performed by: UROLOGY

## 2021-06-13 PROCEDURE — 87086 URINE CULTURE/COLONY COUNT: CPT | Performed by: UROLOGY

## 2021-06-13 PROCEDURE — 258N000003 HC RX IP 258 OP 636: Performed by: INTERNAL MEDICINE

## 2021-06-13 PROCEDURE — 36415 COLL VENOUS BLD VENIPUNCTURE: CPT | Performed by: INTERNAL MEDICINE

## 2021-06-13 PROCEDURE — 360N000076 HC SURGERY LEVEL 3, PER MIN: Performed by: UROLOGY

## 2021-06-13 PROCEDURE — 250N000011 HC RX IP 250 OP 636: Performed by: NURSE ANESTHETIST, CERTIFIED REGISTERED

## 2021-06-13 PROCEDURE — 258N000003 HC RX IP 258 OP 636: Performed by: NURSE ANESTHETIST, CERTIFIED REGISTERED

## 2021-06-13 DEVICE — URETERAL STENT
Type: IMPLANTABLE DEVICE | Site: URETER | Status: NON-FUNCTIONAL
Brand: POLARIS™ ULTRA
Removed: 2021-07-02

## 2021-06-13 RX ORDER — NALOXONE HYDROCHLORIDE 0.4 MG/ML
0.2 INJECTION, SOLUTION INTRAMUSCULAR; INTRAVENOUS; SUBCUTANEOUS
Status: DISCONTINUED | OUTPATIENT
Start: 2021-06-13 | End: 2021-06-14 | Stop reason: HOSPADM

## 2021-06-13 RX ORDER — PROPOFOL 10 MG/ML
INJECTION, EMULSION INTRAVENOUS PRN
Status: DISCONTINUED | OUTPATIENT
Start: 2021-06-13 | End: 2021-06-13

## 2021-06-13 RX ORDER — FENTANYL CITRATE 50 UG/ML
25-50 INJECTION, SOLUTION INTRAMUSCULAR; INTRAVENOUS
Status: DISCONTINUED | OUTPATIENT
Start: 2021-06-13 | End: 2021-06-13 | Stop reason: HOSPADM

## 2021-06-13 RX ORDER — TAMSULOSIN HYDROCHLORIDE 0.4 MG/1
0.4 CAPSULE ORAL DAILY
Status: DISCONTINUED | OUTPATIENT
Start: 2021-06-13 | End: 2021-06-14 | Stop reason: HOSPADM

## 2021-06-13 RX ORDER — PROCHLORPERAZINE 25 MG
12.5 SUPPOSITORY, RECTAL RECTAL EVERY 12 HOURS PRN
Status: DISCONTINUED | OUTPATIENT
Start: 2021-06-13 | End: 2021-06-14 | Stop reason: HOSPADM

## 2021-06-13 RX ORDER — CEFTRIAXONE 1 G/1
1 INJECTION, POWDER, FOR SOLUTION INTRAMUSCULAR; INTRAVENOUS EVERY 24 HOURS
Status: DISCONTINUED | OUTPATIENT
Start: 2021-06-13 | End: 2021-06-14

## 2021-06-13 RX ORDER — PROCHLORPERAZINE MALEATE 5 MG
5 TABLET ORAL EVERY 6 HOURS PRN
Status: DISCONTINUED | OUTPATIENT
Start: 2021-06-13 | End: 2021-06-14 | Stop reason: HOSPADM

## 2021-06-13 RX ORDER — ONDANSETRON 4 MG/1
4 TABLET, ORALLY DISINTEGRATING ORAL EVERY 6 HOURS PRN
Status: DISCONTINUED | OUTPATIENT
Start: 2021-06-13 | End: 2021-06-14 | Stop reason: HOSPADM

## 2021-06-13 RX ORDER — DORZOLAMIDE HYDROCHLORIDE AND TIMOLOL MALEATE 20; 5 MG/ML; MG/ML
1 SOLUTION/ DROPS OPHTHALMIC 2 TIMES DAILY
Status: DISCONTINUED | OUTPATIENT
Start: 2021-06-13 | End: 2021-06-14 | Stop reason: HOSPADM

## 2021-06-13 RX ORDER — CEFAZOLIN SODIUM 2 G/100ML
2 INJECTION, SOLUTION INTRAVENOUS SEE ADMIN INSTRUCTIONS
Status: DISCONTINUED | OUTPATIENT
Start: 2021-06-13 | End: 2021-06-13

## 2021-06-13 RX ORDER — OXYCODONE AND ACETAMINOPHEN 5; 325 MG/1; MG/1
1-2 TABLET ORAL EVERY 4 HOURS PRN
Status: DISCONTINUED | OUTPATIENT
Start: 2021-06-13 | End: 2021-06-14 | Stop reason: HOSPADM

## 2021-06-13 RX ORDER — HYDROMORPHONE HYDROCHLORIDE 1 MG/ML
.3-.5 INJECTION, SOLUTION INTRAMUSCULAR; INTRAVENOUS; SUBCUTANEOUS EVERY 5 MIN PRN
Status: DISCONTINUED | OUTPATIENT
Start: 2021-06-13 | End: 2021-06-13 | Stop reason: HOSPADM

## 2021-06-13 RX ORDER — ACETAMINOPHEN 650 MG/1
650 SUPPOSITORY RECTAL EVERY 4 HOURS PRN
Status: DISCONTINUED | OUTPATIENT
Start: 2021-06-13 | End: 2021-06-14 | Stop reason: HOSPADM

## 2021-06-13 RX ORDER — LIDOCAINE HYDROCHLORIDE 20 MG/ML
INJECTION, SOLUTION INFILTRATION; PERINEURAL PRN
Status: DISCONTINUED | OUTPATIENT
Start: 2021-06-13 | End: 2021-06-13

## 2021-06-13 RX ORDER — NALOXONE HYDROCHLORIDE 0.4 MG/ML
0.4 INJECTION, SOLUTION INTRAMUSCULAR; INTRAVENOUS; SUBCUTANEOUS
Status: DISCONTINUED | OUTPATIENT
Start: 2021-06-13 | End: 2021-06-14 | Stop reason: HOSPADM

## 2021-06-13 RX ORDER — HYDROMORPHONE HCL IN WATER/PF 6 MG/30 ML
0.2 PATIENT CONTROLLED ANALGESIA SYRINGE INTRAVENOUS
Status: DISCONTINUED | OUTPATIENT
Start: 2021-06-13 | End: 2021-06-14 | Stop reason: HOSPADM

## 2021-06-13 RX ORDER — ONDANSETRON 2 MG/ML
4 INJECTION INTRAMUSCULAR; INTRAVENOUS EVERY 30 MIN PRN
Status: DISCONTINUED | OUTPATIENT
Start: 2021-06-13 | End: 2021-06-13 | Stop reason: HOSPADM

## 2021-06-13 RX ORDER — AMOXICILLIN 250 MG
2 CAPSULE ORAL 2 TIMES DAILY PRN
Status: DISCONTINUED | OUTPATIENT
Start: 2021-06-13 | End: 2021-06-14 | Stop reason: HOSPADM

## 2021-06-13 RX ORDER — CIPROFLOXACIN 250 MG/1
250 TABLET, FILM COATED ORAL EVERY 24 HOURS
Status: DISCONTINUED | OUTPATIENT
Start: 2021-06-13 | End: 2021-06-14 | Stop reason: HOSPADM

## 2021-06-13 RX ORDER — SODIUM CHLORIDE 9 MG/ML
INJECTION, SOLUTION INTRAVENOUS CONTINUOUS
Status: DISCONTINUED | OUTPATIENT
Start: 2021-06-13 | End: 2021-06-14 | Stop reason: HOSPADM

## 2021-06-13 RX ORDER — ACETAMINOPHEN 325 MG/1
650 TABLET ORAL EVERY 4 HOURS PRN
Status: DISCONTINUED | OUTPATIENT
Start: 2021-06-13 | End: 2021-06-14 | Stop reason: HOSPADM

## 2021-06-13 RX ORDER — SODIUM CHLORIDE, SODIUM LACTATE, POTASSIUM CHLORIDE, CALCIUM CHLORIDE 600; 310; 30; 20 MG/100ML; MG/100ML; MG/100ML; MG/100ML
INJECTION, SOLUTION INTRAVENOUS CONTINUOUS
Status: DISCONTINUED | OUTPATIENT
Start: 2021-06-13 | End: 2021-06-13 | Stop reason: HOSPADM

## 2021-06-13 RX ORDER — HYDRALAZINE HYDROCHLORIDE 20 MG/ML
10 INJECTION INTRAMUSCULAR; INTRAVENOUS EVERY 6 HOURS PRN
Status: DISCONTINUED | OUTPATIENT
Start: 2021-06-13 | End: 2021-06-14 | Stop reason: HOSPADM

## 2021-06-13 RX ORDER — ONDANSETRON 2 MG/ML
4 INJECTION INTRAMUSCULAR; INTRAVENOUS EVERY 6 HOURS PRN
Status: DISCONTINUED | OUTPATIENT
Start: 2021-06-13 | End: 2021-06-14 | Stop reason: HOSPADM

## 2021-06-13 RX ORDER — ZINC GLUCONATE 50 MG
50 TABLET ORAL DAILY
COMMUNITY

## 2021-06-13 RX ORDER — FENTANYL CITRATE 50 UG/ML
INJECTION, SOLUTION INTRAMUSCULAR; INTRAVENOUS PRN
Status: DISCONTINUED | OUTPATIENT
Start: 2021-06-13 | End: 2021-06-13

## 2021-06-13 RX ORDER — SODIUM CHLORIDE, SODIUM LACTATE, POTASSIUM CHLORIDE, CALCIUM CHLORIDE 600; 310; 30; 20 MG/100ML; MG/100ML; MG/100ML; MG/100ML
INJECTION, SOLUTION INTRAVENOUS CONTINUOUS PRN
Status: DISCONTINUED | OUTPATIENT
Start: 2021-06-13 | End: 2021-06-13

## 2021-06-13 RX ORDER — LIDOCAINE 40 MG/G
CREAM TOPICAL
Status: DISCONTINUED | OUTPATIENT
Start: 2021-06-13 | End: 2021-06-14 | Stop reason: HOSPADM

## 2021-06-13 RX ORDER — EPHEDRINE SULFATE 50 MG/ML
INJECTION, SOLUTION INTRAMUSCULAR; INTRAVENOUS; SUBCUTANEOUS PRN
Status: DISCONTINUED | OUTPATIENT
Start: 2021-06-13 | End: 2021-06-13

## 2021-06-13 RX ORDER — ONDANSETRON 2 MG/ML
INJECTION INTRAMUSCULAR; INTRAVENOUS PRN
Status: DISCONTINUED | OUTPATIENT
Start: 2021-06-13 | End: 2021-06-13

## 2021-06-13 RX ORDER — ONDANSETRON 4 MG/1
4 TABLET, ORALLY DISINTEGRATING ORAL EVERY 30 MIN PRN
Status: DISCONTINUED | OUTPATIENT
Start: 2021-06-13 | End: 2021-06-13 | Stop reason: HOSPADM

## 2021-06-13 RX ORDER — BISACODYL 10 MG
10 SUPPOSITORY, RECTAL RECTAL DAILY PRN
Status: DISCONTINUED | OUTPATIENT
Start: 2021-06-13 | End: 2021-06-14 | Stop reason: HOSPADM

## 2021-06-13 RX ORDER — AMOXICILLIN 250 MG
1 CAPSULE ORAL 2 TIMES DAILY PRN
Status: DISCONTINUED | OUTPATIENT
Start: 2021-06-13 | End: 2021-06-14 | Stop reason: HOSPADM

## 2021-06-13 RX ORDER — DORZOLAMIDE HCL 20 MG/ML
1 SOLUTION/ DROPS OPHTHALMIC 3 TIMES DAILY
Status: DISCONTINUED | OUTPATIENT
Start: 2021-06-13 | End: 2021-06-13 | Stop reason: ALTCHOICE

## 2021-06-13 RX ORDER — PROPOFOL 10 MG/ML
INJECTION, EMULSION INTRAVENOUS CONTINUOUS PRN
Status: DISCONTINUED | OUTPATIENT
Start: 2021-06-13 | End: 2021-06-13

## 2021-06-13 RX ORDER — DEXAMETHASONE SODIUM PHOSPHATE 4 MG/ML
INJECTION, SOLUTION INTRA-ARTICULAR; INTRALESIONAL; INTRAMUSCULAR; INTRAVENOUS; SOFT TISSUE PRN
Status: DISCONTINUED | OUTPATIENT
Start: 2021-06-13 | End: 2021-06-13

## 2021-06-13 RX ORDER — CEFAZOLIN SODIUM 2 G/100ML
2 INJECTION, SOLUTION INTRAVENOUS
Status: DISCONTINUED | OUTPATIENT
Start: 2021-06-13 | End: 2021-06-13

## 2021-06-13 RX ORDER — LATANOPROST 50 UG/ML
1 SOLUTION/ DROPS OPHTHALMIC DAILY
Status: DISCONTINUED | OUTPATIENT
Start: 2021-06-13 | End: 2021-06-14 | Stop reason: HOSPADM

## 2021-06-13 RX ADMIN — PROPOFOL 100 MG: 10 INJECTION, EMULSION INTRAVENOUS at 11:01

## 2021-06-13 RX ADMIN — Medication 5 MG: at 11:07

## 2021-06-13 RX ADMIN — LATANOPROST 1 DROP: 50 SOLUTION/ DROPS OPHTHALMIC at 21:28

## 2021-06-13 RX ADMIN — PROPOFOL 25 MCG/KG/MIN: 10 INJECTION, EMULSION INTRAVENOUS at 11:02

## 2021-06-13 RX ADMIN — DEXAMETHASONE SODIUM PHOSPHATE 4 MG: 4 INJECTION, SOLUTION INTRA-ARTICULAR; INTRALESIONAL; INTRAMUSCULAR; INTRAVENOUS; SOFT TISSUE at 11:04

## 2021-06-13 RX ADMIN — CEFAZOLIN SODIUM 2 G: 2 INJECTION, SOLUTION INTRAVENOUS at 11:04

## 2021-06-13 RX ADMIN — DORZOLAMIDE HYDROCHLORIDE AND TIMOLOL MALEATE 1 DROP: 20; 5 SOLUTION/ DROPS OPHTHALMIC at 15:24

## 2021-06-13 RX ADMIN — SODIUM CHLORIDE 500 ML: 9 INJECTION, SOLUTION INTRAVENOUS at 00:37

## 2021-06-13 RX ADMIN — FENTANYL CITRATE 50 MCG: 50 INJECTION, SOLUTION INTRAMUSCULAR; INTRAVENOUS at 11:09

## 2021-06-13 RX ADMIN — CIPROFLOXACIN HYDROCHLORIDE 250 MG: 250 TABLET, FILM COATED ORAL at 21:27

## 2021-06-13 RX ADMIN — CEFTRIAXONE SODIUM 1 G: 1 INJECTION, POWDER, FOR SOLUTION INTRAMUSCULAR; INTRAVENOUS at 02:27

## 2021-06-13 RX ADMIN — TAMSULOSIN HYDROCHLORIDE 0.4 MG: 0.4 CAPSULE ORAL at 15:24

## 2021-06-13 RX ADMIN — PHENYLEPHRINE HYDROCHLORIDE 150 MCG: 10 INJECTION INTRAVENOUS at 11:22

## 2021-06-13 RX ADMIN — SODIUM CHLORIDE: 9 INJECTION, SOLUTION INTRAVENOUS at 17:37

## 2021-06-13 RX ADMIN — ONDANSETRON 4 MG: 2 INJECTION INTRAMUSCULAR; INTRAVENOUS at 11:05

## 2021-06-13 RX ADMIN — SENNOSIDES AND DOCUSATE SODIUM 2 TABLET: 8.6; 5 TABLET ORAL at 17:41

## 2021-06-13 RX ADMIN — DORZOLAMIDE HYDROCHLORIDE AND TIMOLOL MALEATE 1 DROP: 20; 5 SOLUTION/ DROPS OPHTHALMIC at 21:28

## 2021-06-13 RX ADMIN — FENTANYL CITRATE 50 MCG: 50 INJECTION, SOLUTION INTRAMUSCULAR; INTRAVENOUS at 11:01

## 2021-06-13 RX ADMIN — SODIUM CHLORIDE, POTASSIUM CHLORIDE, SODIUM LACTATE AND CALCIUM CHLORIDE: 600; 310; 30; 20 INJECTION, SOLUTION INTRAVENOUS at 10:56

## 2021-06-13 RX ADMIN — LIDOCAINE HYDROCHLORIDE 40 MG: 20 INJECTION, SOLUTION INFILTRATION; PERINEURAL at 11:01

## 2021-06-13 RX ADMIN — Medication 10 MG: at 11:10

## 2021-06-13 RX ADMIN — PHENYLEPHRINE HYDROCHLORIDE 100 MCG: 10 INJECTION INTRAVENOUS at 11:16

## 2021-06-13 RX ADMIN — SODIUM CHLORIDE: 9 INJECTION, SOLUTION INTRAVENOUS at 02:27

## 2021-06-13 ASSESSMENT — ENCOUNTER SYMPTOMS: SEIZURES: 0

## 2021-06-13 ASSESSMENT — ACTIVITIES OF DAILY LIVING (ADL)
ADLS_ACUITY_SCORE: 12

## 2021-06-13 ASSESSMENT — MIFFLIN-ST. JEOR: SCORE: 1432.43

## 2021-06-13 ASSESSMENT — LIFESTYLE VARIABLES: TOBACCO_USE: 0

## 2021-06-13 NOTE — H&P
Admitted: 06/12/2021    PRIMARY CARE PHYSICIAN: Hosea Lockett MD    CODE STATUS:  Full code.  Discussed with the patient and his wife.    CHIEF COMPLAINT:  Abdominal discomfort and weakness.    HISTORY OF PRESENT ILLNESS:  Mr. Ray is an 86-year-old male with a past medical history significant for hypertension, prostate cancer and right lower extremity DVT, maintained on Xarelto, who presents to the Emergency Department with the above concern.  History is obtained through discussion with the ED physician, the patient and his wife at bedside.  The patient states that for the past several days, he has been feeling just generally weak and tired and then started having some abdominal pain.  This is mostly in the center of his abdomen, both above and below his belt.  The patient has not had any fevers, but felt a little chilled at times.  He is drinking fine, but has not had much of an appetite.  He noted that about a week ago, he had some dark chocolate-colored urine, which has since resolved and not recurred.  He does not note any blood in his urine, nor does he have any dysuria.  He has been taking all of his medications regularly, but did not take his Xarelto this evening.  He denies any other complaints at this point.    In the Emergency Department, the patient had basic labs showing evidence of new acute kidney injury.  He had an abdominal CT showing evidence of a 5 mm right distal ureter stone with right sided hydronephrosis.  He has been given IV fluids.    PAST MEDICAL HISTORY:     1.  Hypertension.  2.  Prostate cancer.  3.  Diverticulosis.  4.  Eczema.  5.  Osteoarthritis.  6.  Right-sided DVT in 1996 and more recently the gastrocnemius vein in 2020.  7.  Basal cell skin cancer.  8.  Dyslipidemia.  9.  Glaucoma.    MEDICATIONS:    Prior to Admission medications    Medication Sig Last Dose Taking? Auth Provider   ascorbic acid 1000 MG TABS tablet Take 1,000 mg by mouth 2 times daily 6/12/2021 at AM Yes  Unknown, Entered By History   Cholecalciferol (VITAMIN D PO) Take 1,000 Units by mouth daily  6/12/2021 at AM Yes Reported, Patient   dorzolamide-timolol PF (COSOPT) 22.3-6.8 MG/ML opthalmic solution Place 1 drop into both eyes 2 times daily 6/12/2021 at AM Yes Unknown, Entered By History   hydrochlorothiazide (HYDRODIURIL) 12.5 MG tablet Take 1 tablet (12.5 mg) by mouth daily 6/12/2021 at AM Yes Hosea Lockett MD   ipratropium (ATROVENT) 0.06 % nasal spray Spray 2 sprays into both nostrils 2 times daily 6/12/2021 at Unknown time Yes Hosea Lockett MD   latanoprost (XALATAN) 0.005 % ophthalmic solution Place 1 drop into both eyes daily  6/11/2021 at HS Yes Hosea Lockett MD   losartan (COZAAR) 100 MG tablet Take 1 tablet (100 mg) by mouth daily 6/12/2021 at AM Yes Hosea Lockett MD   MULTIPLE VITAMIN PO Take 1 tablet by mouth every morning 6/12/2021 at AM Yes Unknown, Entered By History   rivaroxaban ANTICOAGULANT (XARELTO ANTICOAGULANT) 20 MG TABS tablet Take 1 tablet (20 mg) by mouth daily (with dinner) 6/11/2021 Yes Hosea Lockett MD   triamcinolone (KENALOG) 0.1 % external cream Apply topically daily as needed for irritation (Back)   at PRN Yes Unknown, Entered By History   vitamin B complex with vitamin C (VITAMIN  B COMPLEX) tablet Take 1 tablet by mouth daily 6/12/2021 at Unknown time Yes Unknown, Entered By History   zinc gluconate 50 MG tablet Take 50 mg by mouth daily 6/12/2021 at Unknown time Yes Unknown, Entered By History         SOCIAL HISTORY:  The patient has been a lifelong nonsmoker and drinks alcohol infrequently.    FAMILY HISTORY:  Mother had hypertension and DVT.  Father had lung cancer.    ALLERGIES:  No known drug allergies.  He is allergic to cat hair and the PNEUMOCOCCAL VACCINE.    REVIEW OF SYSTEMS:  The complete review of systems reviewed and negative save for the pertinent positives recorded in the HPI.    PHYSICAL EXAMINATION:    VITAL SIGNS:  Show  blood pressure of 137/99, heart rate 120s, respirations 18, satting 97% on room air, temperature 97.7 degrees Fahrenheit.  GENERAL:  Sitting in bed and appears comfortable.  HEENT:  Pupils equal, round, reactive to light, extraocular muscle function intact.  No scleral icterus.  Oropharynx is clear.  NECK:  No lymphadenopathy or thyromegaly.  CARDIOVASCULAR:  Heart is mildly tachycardic without any appreciable murmur, rub or gallop.  PULMONARY:  Lungs are clear to auscultation bilaterally without wheeze or rhonchi.  GASTROINTESTINAL:  Positive bowel sounds, Soft, nontender and nondistended.  SKIN:  No rashes or lesions.  LYMPHATICS: No peripheral edema.  PSYCHIATRIC:  Alert and oriented x3.  Normal affect.  NEUROLOGIC:  Cranial nerves II through XII are grossly intact.  No focal deficits.    LABORATORY DATA:  WBC count 13.5, hemoglobin 14.3, platelets of 234.  Does appear to be a left shift.  Sodium 136, potassium 3.9, chloride 105, CO2 of 21, BUN 45, creatinine 2.83 with unremarkable LFTs save for mildly elevated bilirubin at 1.8.  UA shows moderate blood, 17 RBCs, 3 WBCs and some mucus.    IMAGING:  Abdominal CT shows moderate right hydronephrosis caused by a 5 mm distal ureteral stone with a single 6 mm left intrarenal stone.  There is cholelithiasis and colonic diverticula, particularly without evidence of acute diverticulitis.  Some fluid stranding around the right kidney and ureter is noted.    IMPRESSION AND PLAN:  Mr. Ray is an 86-year-old male with a past medical history significant for hypertension, prostate cancer, right deep venous thrombosis, maintained on Xarelto, who presents to the Emergency Department with weakness and abdominal discomfort, found to have right hydronephrosis due to a distal ureter stone and acute kidney injury.  1.  Acute kidney injury due to right distal ureter stone and associated hydronephrosis:  The patient has been initiated on intravenous fluids, which will be continued.   He did have some chills, leukocytosis with left shift and some stranding around the kidney, so while his urinalysis looks pretty bland and I do not definitively have evidence of infection.  I am going to start ceftriaxone for now.  We will consult urology in the morning for consideration of stone extraction.  We will be holding losartan, hydrochlorothiazide given the acute kidney injury and Xarelto given the possibility of an upcoming procedure.  2.  Hypertension:  Blood pressure is currently adequately controlled.  We will be holding losartan, hydrochlorothiazide for now given the acute kidney injury.  3.  Right deep venous thrombosis:  He is maintained on Xarelto, but has not taken it this evening.  We will hold for now given the possibility of stone extraction in the coming 1-2 days.  4.  Glaucoma:  We will continue with eyedrops once verified.  5.  Deep venous thrombosis prophylaxis:  Will resume Xarelto after his upcoming procedure.  6.  Disposition:  Prior inpatient status, as I expect him to be here until Monday at least even if he gets surgery tomorrow to ensure resolution of his acute kidney injury or at least downtrending of it.    Michael Hinson DO        D: 2021   T: 2021   MT: [id number]    Name:     SHAN VALERA  MRN:      [id number]        Account:     [id number]   :      1935           Admitted:    2021       Document: [id number]    cc:  Hosea Lockett MD

## 2021-06-13 NOTE — PROGRESS NOTES
I was in contact with Dr Martinez today  His team will take over care from here and will see the patient tomorrow  I appreciate his help.

## 2021-06-13 NOTE — PHARMACY-ADMISSION MEDICATION HISTORY
Pharmacy Medication History  Admission medication history interview status for the 6/12/2021  admission is complete. See EPIC admission navigator for prior to admission medications     Location of Interview: Phone  Medication history sources: Patient's family/friend (Wife Usha) and [employer]    Significant changes made to the medication list:  Added B complex, zinc  Changed D3 dose    In the past week, patient estimated taking medication this percent of the time: greater than 90%    Additional medication history information:   None    Medication reconciliation completed by provider prior to medication history? No    Time spent in this activity: 15 minutes    Prior to Admission medications    Medication Sig Last Dose Taking? Auth Provider   ascorbic acid 1000 MG TABS tablet Take 1,000 mg by mouth 2 times daily 6/12/2021 at AM Yes Unknown, Entered By History   Cholecalciferol (VITAMIN D PO) Take 1,000 Units by mouth daily  6/12/2021 at AM Yes Reported, Patient   dorzolamide-timolol PF (COSOPT) 22.3-6.8 MG/ML opthalmic solution Place 1 drop into both eyes 2 times daily 6/12/2021 at AM Yes Unknown, Entered By History   hydrochlorothiazide (HYDRODIURIL) 12.5 MG tablet Take 1 tablet (12.5 mg) by mouth daily 6/12/2021 at AM Yes Hosea Lockett MD   ipratropium (ATROVENT) 0.06 % nasal spray Spray 2 sprays into both nostrils 2 times daily 6/12/2021 at Unknown time Yes Hosea Lockett MD   latanoprost (XALATAN) 0.005 % ophthalmic solution Place 1 drop into both eyes daily  6/11/2021 at HS Yes Hosea Lockett MD   losartan (COZAAR) 100 MG tablet Take 1 tablet (100 mg) by mouth daily 6/12/2021 at AM Yes Hosea Lockett MD   MULTIPLE VITAMIN PO Take 1 tablet by mouth every morning 6/12/2021 at AM Yes Unknown, Entered By History   rivaroxaban ANTICOAGULANT (XARELTO ANTICOAGULANT) 20 MG TABS tablet Take 1 tablet (20 mg) by mouth daily (with dinner) 6/11/2021 Yes Hosea Lockett MD   triamcinolone  (KENALOG) 0.1 % external cream Apply topically daily as needed for irritation (Back)   at PRN Yes Unknown, Entered By History   vitamin B complex with vitamin C (VITAMIN  B COMPLEX) tablet Take 1 tablet by mouth daily 6/12/2021 at Unknown time Yes Unknown, Entered By History   zinc gluconate 50 MG tablet Take 50 mg by mouth daily 6/12/2021 at Unknown time Yes Unknown, Entered By History       The information provided in this note is only as accurate as the sources available at the time of update(s)

## 2021-06-13 NOTE — ANESTHESIA CARE TRANSFER NOTE
Patient: Jose Ray    Procedure(s):  Cystoscopy, right ureter stent placement    Diagnosis: Right ureteral stone [N20.1]  Diagnosis Additional Information: No value filed.    Anesthesia Type:   General     Note:    Oropharynx: oropharynx clear of all foreign objects and spontaneously breathing  Level of Consciousness: drowsy  Oxygen Supplementation: face mask  Level of Supplemental Oxygen (L/min / FiO2): 6  Independent Airway: airway patency satisfactory and stable  Dentition: dentition unchanged  Vital Signs Stable: post-procedure vital signs reviewed and stable  Report to RN Given: handoff report given  Patient transferred to: PACU    Handoff Report: Identifed the Patient, Identified the Reponsible Provider, Reviewed the pertinent medical history, Discussed the surgical course, Reviewed Intra-OP anesthesia mangement and issues during anesthesia, Set expectations for post-procedure period and Allowed opportunity for questions and acknowledgement of understanding      Vitals: (Last set prior to Anesthesia Care Transfer)  CRNA VITALS  6/13/2021 1103 - 6/13/2021 1136      6/13/2021             Pulse:  86    SpO2:  98 %    Resp Rate (set):  10        Electronically Signed By: SMITHA Powell CRNA  June 13, 2021  11:36 AM

## 2021-06-13 NOTE — PROGRESS NOTES
[facility]  [facility] Pre-Op  PRE-SURGICAL VISIT    Pre-Surgical visit with Patient Salvador. He asked about [employer], saying they're long-time friends, but that [employer] didn't need to be called in specially. When I offered, Salvador agreed that I could simply let [employer] know he is here.    I offered spiritual and emotional support and said a blessing.    I rang [employer] and let him know Salvador is here in hospital.    SH continues to be available.      Kadie Fermin  Chaplain Resident  [phone] (pager)  [phone] (office)

## 2021-06-13 NOTE — ANESTHESIA PROCEDURE NOTES
Airway       Patient location during procedure: OR ([facility] - Operating Room or Procedural Area)       Procedure Start/Stop Times: 6/13/2021 11:03 AM  Staff -        Anesthesiologist:  Adalberto Villa MD       CRNA: Ángela Davis APRN CRNA       Performed By: CRNA  Consent for Airway        Urgency: elective  Indications and Patient Condition       Indications for airway management: maria antonia-procedural       Induction type:intravenous       Mask difficulty assessment: 0 - not attempted    Final Airway Details       Final airway type: supraglottic airway    Supraglottic Airway Details        Type: LMA       Brand: LMA Unique       LMA size: 5    Post intubation assessment        Number of attempts at approach: 1       Number of other approaches attempted: 0       Ease of procedure: easy       Dentition: Intact and Unchanged

## 2021-06-13 NOTE — CONSULTS
Consult Date: 06/13/2021    UROLOGY INPATIENT CONSULTATION    REASON FOR CONSULTATION:  Right ureteral stone.    HISTORY OF PRESENT ILLNESS:  Salvador Ray is an 86-year-old gentleman who reports about 1 week of intermittent right sided flank pain.  He came to the Emergency Department with the above complaints and had a CT scan that showed a 5 mm stone in the distal right ureter.  It appears that there were some concerns in the Emergency Department yesterday about his creatinine began elevated, so he was admitted.  He has a contralateral kidney that is normal.  There is another nonobstructing stone in the contralateral kidney.  He has had no pain since arriving at the Emergency Department.  He has felt well.  He has no symptoms at this time.    PAST MEDICAL HISTORY:  He reports no prior stone history.  He has hypertension.  He has a history of prostate cancer.    PAST SURGICAL HISTORY:  He has had a prior prostatectomy, hernia repair.    FAMILY HISTORY:  No family history of urologic malignancy or stones.    SOCIAL HISTORY:  Does not smoke.    HOME MEDICATIONS:  Hydrochlorothiazide, ipratropium, losartan.    ALLERGIES:  PNEUMOCOCCAL VACCINE.    REVIEW OF SYSTEMS:  Negative.  He feels well.    PHYSICAL EXAMINATION:  He is currently afebrile.  Vital signs are stable.  When I walked in the room, he was asleep and comfortable.  He is normocephalic, atraumatic.  Normal nonlabored breathing.  His abdomen is soft, nontender, nondistended.    IMAGING STUDIES:  I reviewed his CT scan images, and there was a 5 mm stone in the distal ureter.  There was a similar size stone in the left kidney that was nonobstructing.    LABORATORY DATA:  Urinalysis shows 17 red blood cells and only 3 white blood cells.  It is nitrite negative.  It is leukocyte esterase negative.  Serum white blood count of 13.5.  Creatinine is 2.85.    IMPRESSION AND PLAN:  This is an 86-year-old gentleman with a distal right ureteral stone.  I discussed trial  passage versus treating the stone.  The stone is in a size, we would expect it to be able to pass.  It was also close to passing.  However, the patient says that he has had intermittent pain for about a week, and even though he is feeling comfortable right now, he wants to have the stone removed.  I discussed cystoscopy with right sided ureteroscopy, holmium laser lithotripsy, stone basketing, and stent placement.  He wished to proceed.  We will proceed to the operating room today.  He can likely be discharged home later today postoperatively.    Rishi Vincent MD    ADDENDUM: After seeing the patient and dictating this note, I looked further back into the patient's chart in [facility] and found that he was a patient of Dr Martinez' and had his prostatectomy with Dr Martinez. I did not find this until the patient was in the operating room already. His PSA is detectable now as well.    I spoke with the patient's wife after surgery and she confirmed this and was aware of detectable PSA, he continues to follow with Dr Martinez for this. I will contact Dr Martinez and likely have his team take over care after surgery today.      D: 2021   T: 2021   MT: [facility]    Name:     SHAN VALERA  MRN:      [id number]        Account:      [id number]   :      1935           Consult Date: 2021     Document: [id number]

## 2021-06-13 NOTE — PROGRESS NOTES
[facility]    Internal Medicine Hospitalist Progress Note  06/13/2021  I evaluated patient on the above date.    Wilfrido Orellana Jr., MD  [phone] (p)  Text Page        Assessment & Plan New actions/orders today (06/13/2021) are underlined.    Mr. Ray is an 86-year-old male with a past medical history significant for hypertension, DVT on rivaroxaban, nephrolithiasis and h/o prostate cancer, who presented 6/12/2021 with abdominal pain and found with have Laura with imaging revealing right hydronephrosis due to a distal ureter stone.      Right obstructive distal ureter stone with moderate right hydronephrosis with LAURA.  Possible complicated UTI or pyelonephritis due to above.  Pt with h/o nephrolithiasis. Presented 6/12 with abdominal pain. On initial evaluation 6/12, was afebrile, tachycardic; WBC 13.5, cr 2.85, UA with only 3 WBC, 17 RBC. CT abdomen 6/12 showed moderate right hydronephrosis caused by a 5 mm distal ureteral stone; fluid stranding about the right kidney and ureter noted; single 6 mm left intrarenal stone; cholelithiasis; colonic diverticula without acute diverticulitis; no bowel obstruction or inflammation. Started on IVF's and antibiotics on admit.  - Continue IV fluids.  - Strain urine.  - Start tamsulosin 0.4 mg daily.  - Urology consulted, appreciate help.  - Continue ceftriaxone (started 6/13).  - Continue PRN acetaminophen, PRN oxycodone-acetaminophen, PRN IV hydromorphone; minimize opioids as able.  - Monitor cultures.  - Monitor BMP.  - Avoid nephrotoxic medications.    LAURA due to obstructive ureteral stone.  Cr 2.85 on admit 6/12. Found with obstructive right ureteral stone with moderate right hydronephrosis as above. Started on IVF's on admit. PTA HCTZ and losartan held on admit.  Recent Labs   Lab 06/12/21 2227   CR 2.85*   - Treat obstructive ureteral stone as above.  - Continue to hold HCTZ and losartan.  - Continue IVF's.   - Monitor BMP.  - Avoid  "nephrotoxic medications.    Hypertension (benign essential).  [PTA: HCTZ 12.5 mg daily; losartan 100 mg daily.]  HCTZ and losartan held on admit as above.  - Continue to hold HCTZ and losartan due to NORBERT.  - Add PRN IV hydralazine, though BP's acceptable now.    Right deep venous thrombosis.  * H/o right lower extremity DVT 2/2020. Maintained on rivaroxaban.  * Did not take evening 6/12 rivaroxaban dose. Rivaroxaban held on admit due to above.  - Continue to hold rivaroxaban.    COVID-19 testing.  COVID-19 PCR Results    COVID-19 PCR Results 6/13/21   SARS-CoV-2 Virus Specimen Source Nasopharyngeal   SARS-CoV-2 PCR Result NEGATIVE      Comments are available for some flowsheets but are not being displayed.         COVID-19 Antibody Results, Testing for Immunity    COVID-19 Antibody Results, Testing for Immunity   No data to display.             Diet: NPO per Anesthesia Guidelines for Procedure/Surgery Except for: Meds    Prophylaxis: PCD's, ambulation.   [place] Catheter: not present  Code Status: Full Code    Disposition Plan   Expected discharge: 1-2d, recommended to prior living arrangement pending above.  Entered: Wilfrido Orellana MD 06/13/2021, 8:10 AM         Interval History   Doing better.  Pain better.  No chest pain or dyspnea.    -Data reviewed today: I reviewed all new labs and imaging over the last 24 hours. I personally reviewed no images or EKG's today.    Physical Exam    , Blood pressure 99/65, pulse 124, temperature 100  F (37.8  C), temperature source Oral, resp. rate 18, height 1.753 m (5' 9\"), weight 75.7 kg (166 lb 14.4 oz), SpO2 92 %.  Vitals:    06/13/21 0153   Weight: 75.7 kg (166 lb 14.4 oz)     Vital Signs with Ranges  Temp:  [96.7  F (35.9  C)-100  F (37.8  C)] 100  F (37.8  C)  Pulse:  [] 124  Resp:  [18] 18  BP: ()/() 99/65  SpO2:  [92 %-97 %] 92 %  Patient Vitals for the past 24 hrs:   BP Temp Temp src Pulse Resp SpO2 Height Weight   06/13/21 0803 99/65 100  F (37.8 " " C) Oral 124 18 92 % -- --   06/13/21 0153 -- -- -- -- -- -- 1.753 m (5' 9\") 75.7 kg (166 lb 14.4 oz)   06/13/21 0146 138/55 96.7  F (35.9  C) Oral 74 18 97 % -- --   06/13/21 0115 (!) 137/99 -- -- 124 -- 97 % -- --   06/13/21 0100 (!) 149/97 -- -- 92 -- 93 % -- --   06/13/21 0045 (!) 161/111 -- -- 124 -- 96 % -- --   06/13/21 0030 (!) 155/96 -- -- -- -- -- -- --   06/12/21 2150 103/75 97.7  F (36.5  C) Temporal 130 18 97 % -- --     I/O's Last 24 hours  No intake/output data recorded.    Constitutional: Awake, alert, pleasant.  Respiratory: Diminished in bases. No crackles or wheezes.  Cardiovascular: RRR, no m/r/g.  GI: Soft, tender mid to right abdomen, no r/g, +BS, nd.  Skin/Integumen: No bilateral lower extremity edema.  Other:        Data   Recent Labs   Lab 06/12/21 2227   WBC 13.5*   HGB 14.3   MCV 97         POTASSIUM 3.9   CHLORIDE 105   CO2 21   BUN 45*   CR 2.85*   ANIONGAP 10   REY 9.6   *   ALBUMIN 3.6   PROTTOTAL 7.6   BILITOTAL 1.8*   ALKPHOS 64   ALT 16   AST 9   LIPASE 92     Recent Labs   Lab Test 06/12/21  2227 04/12/21  1717 07/31/20  1505 02/22/20 2033 04/08/19  1130   * 136* 101* 98 96     No results for input(s): CRP, DD, LDH, FIBR, ROSIE, IL6B in the last 168 hours.      Recent Results (from the past 24 hour(s))   CT Abdomen Pelvis w Contrast    Narrative    EXAM: CT ABDOMEN PELVIS W CONTRAST  LOCATION: [facility]  DATE/TIME: 6/12/2021 11:03 PM    INDICATION: Abdominal distension.  COMPARISON: 10/14/2010.  TECHNIQUE: CT scan of the abdomen and pelvis was performed following injection of IV contrast. Multiplanar reformats were obtained. Dose reduction techniques were used.  CONTRAST: 86 mL Isovue-370.    FINDINGS:   LOWER CHEST: Dependent atelectasis at the lung bases.    HEPATOBILIARY: Stones in the gallbladder.    PANCREAS: Normal.    SPLEEN: Normal.    ADRENAL GLANDS: Normal.    KIDNEYS/BLADDER: There is moderate dilatation of the right renal " collecting system and ureter into the pelvis where there is a 5 mm distal ureteral stone immediately above the ureterovesical junction. There is delayed nephrogram on the right consistent   with obstruction. Fluid stranding about the right kidney and ureter. Small cortical cyst at the posterior aspect of the right kidney. Minimally complex cyst at the anterior aspect of the left kidney. There is a 6 mm stone in the upper pole of the left   kidney.    BOWEL: There are colonic diverticula without acute diverticulitis. No bowel obstruction or inflammation.    LYMPH NODES: Normal.    VASCULATURE: Atherosclerotic calcification of the aorta and its branches. No aneurysm.    PELVIC ORGANS: Normal.    MUSCULOSKELETAL: Degenerative disease and scoliosis in the spine.      Impression    IMPRESSION:   1.  Moderate right hydronephrosis caused by a 5 mm distal ureteral stone.  2.  Single 6 mm left intrarenal stone.  3.  Cholelithiasis.  4.  Colonic diverticula without acute diverticulitis. No bowel obstruction or inflammation.       Medications   All medications were reviewed.    sodium chloride 100 mL/hr at 06/13/21 0227       cefTRIAXone  1 g Intravenous Q24H     sodium chloride (PF)  3 mL Intracatheter Q8H     acetaminophen, acetaminophen, bisacodyl, HYDROmorphone, lidocaine 4%, lidocaine (buffered or not buffered), magnesium hydroxide, melatonin, naloxone **OR** naloxone **OR** naloxone **OR** naloxone, ondansetron **OR** ondansetron, oxyCODONE-acetaminophen, prochlorperazine **OR** prochlorperazine **OR** prochlorperazine, senna-docusate **OR** senna-docusate, sodium chloride (PF)

## 2021-06-13 NOTE — ED NOTES
[facility]  ED Nurse Handoff Report    ED Chief complaint: Abdominal Pain and Constipation      ED Diagnosis:   Final diagnoses:   Right ureteral stone   Elevated serum creatinine   Decreased stooling       Code Status: Full Code    Allergies:   Allergies   Allergen Reactions     Cat Hair [Cats]      Pneumococcal Vaccine Rash       Patient Story: abd pain  Focused Assessment:  same    Treatments and/or interventions provided: fluids, scan  Patient's response to treatments and/or interventions: same    To be done/followed up on inpatient unit:  na    Does this patient have any cognitive concerns?: na    Activity level - Baseline/Home:  Independent  Activity Level - Current:   Independent    Patient's Preferred language: English   Needed?: No    Isolation: None  Infection: Not Applicable  Patient tested for COVID 19 prior to admission: YES  Bariatric?: No    Vital Signs:   Vitals:    06/12/21 2150   BP: 103/75   Pulse: 130   Resp: 18   Temp: 97.7  F (36.5  C)   TempSrc: Temporal   SpO2: 97%       Cardiac Rhythm:     Was the PSS-3 completed:   Yes  What interventions are required if any?               Family Comments: wife is a nurse  OBS brochure/video discussed/provided to patient/family: na              Name of person given brochure if not patient:               Relationship to patient:     For the majority of the shift this patient's behavior was Green.   Behavioral interventions performed were .    ED NURSE PHONE NUMBER: [id number]

## 2021-06-13 NOTE — PROGRESS NOTES
Urine found to be infected in OR so stent placed only    Re-admit to floor for observation  I started oral cipro as we await urine culture results  Lunsford catheter can be removed tomorrow AM

## 2021-06-13 NOTE — UTILIZATION REVIEW
Admission Status; Secondary Review Determination       Under the authority of the Utilization Management Committee, the utilization review process indicated a secondary review on the above patient. The review outcome is based on review of the medical records, discussions with staff, and applying clinical experience noted on the date of the review.     (x) Inpatient Status Appropriate - This patient's medical care is consistent with medical management for inpatient care and reasonable inpatient medical practice.     RATIONALE FOR DETERMINATION   86-year-old male with a past medical history significant for hypertension, prostate cancer, right deep venous thrombosis, maintained on Xarelto, who presents to the Emergency Department with weakness and abdominal discomfort, found to have right hydronephrosis due to a distal ureter stone and acute kidney injury.  In OR urine was found to be infected so a stent was placed, ongoing hospital care indicated  The expected length of stay at the time of admission was more than 2 nights because of the severity of illness, intensity of service provided, and risk for adverse outcome. Inpatient admission is appropriate.     This document was produced using voice recognition software       The information on this document is developed by the utilization review team in order for the business office to ensure compliance. This only denotes the appropriateness of proper admission status and does not reflect the quality of care rendered.   The definitions of Inpatient Status and Observation Status used in making the determination above are those provided in the CMS Coverage Manual, Chapter 1 and Chapter 6, section 70.4.   Sincerely,   OCTAVIO LR MD   System Medical Director   Utilization Management   [facility], [place].

## 2021-06-13 NOTE — ANESTHESIA POSTPROCEDURE EVALUATION
Patient: Jose Ray    Procedure(s):  Cystoscopy, right ureter stent placement    Diagnosis:Right ureteral stone [N20.1]  Diagnosis Additional Information: No value filed.    Anesthesia Type:  General    Note:  Disposition: Inpatient   Postop Pain Control: Uneventful            Sign Out: Well controlled pain   PONV: No   Neuro/Psych: Uneventful            Sign Out: Acceptable/Baseline neuro status   Airway/Respiratory: Uneventful            Sign Out: Acceptable/Baseline resp. status   CV/Hemodynamics: Uneventful            Sign Out: Acceptable CV status; No obvious hypovolemia; No obvious fluid overload   Other NRE: NONE   DID A NON-ROUTINE EVENT OCCUR? No           Last vitals:  Vitals:    06/13/21 1150 06/13/21 1200 06/13/21 1233   BP: 131/83 125/81 105/73   Pulse: 85 80 77   Resp: 10 17 20   Temp: 37.3  C (99.2  F)  36.1  C (96.9  F)   SpO2: 95% 95% 93%       Last vitals prior to Anesthesia Care Transfer:  CRNA VITALS  6/13/2021 1103 - 6/13/2021 1203      6/13/2021             Pulse:  86    SpO2:  98 %    Resp Rate (set):  10          Electronically Signed By: Adalberto Villa MD  June 13, 2021  2:35 PM

## 2021-06-13 NOTE — PROGRESS NOTES
RECEIVING UNIT ED HANDOFF REVIEW    ED Nurse Handoff Report was reviewed by: Shavonne Herring RN on June 13, 2021 at 1:21 AM

## 2021-06-13 NOTE — ANESTHESIA PREPROCEDURE EVALUATION
Anesthesia Pre-Procedure Evaluation    Patient: Jose Ray   MRN: [id number] : 1935        Preoperative Diagnosis: Right ureteral stone [N20.1]   Procedure : Procedure(s):  Cystoscopy, right ureteroscopy with holmium laser lithotripsy, right ureteral stent placement     Past Medical History:   Diagnosis Date     Adenomatous colon polyp 2019     Complex renal cyst 2014    seen on us done for htn, fu 6 months, fu done 8/15 and stable; fu  simple cyst, a bit larger     Diverticular hemorrhage 2019    colonic, hosp fsd     DVT, lower extremity, distal, acute, right (H) 2020,     gastrocnemius vein in , not sure which vein in , factor 5 leiden neg      Eczema      Gallstone     seen on ct for renal stone     Generalized osteoarthrosis, unspecified site      HTN (hypertension) 2014    us done and no hydro but renal cyst, had edema with norvasc 5mg, added hctz and stopped norvasc      Hx of colonoscopy     nl     Hydronephrosis of left kidney     seen on above ct     Nephrolithiasis 2010    seen on ct, hydronephrosis seen as well     Prostate cancer (H)     had surgery, D.r Michelle     Skin cancer, basal cell ,     had xrt , mohs surgery      Thrombophlebitis       Past Surgical History:   Procedure Laterality Date     CIRCUMCISION  3/12/2013    Procedure: CIRCUMCISION;  CIRCUMCISION;  Surgeon: Stevenson Martinez MD;  Location: [facility]     COLONOSCOPY  ;     Dr. Wills; [facility]     HERNIA REPAIR      Hernia     mohs surgery  ,     basal cell     TURP  2008    prostate cancer     VASCULAR SURGERY  1985    vein stripping both legs some     [facility] NONSPECIFIC PROCEDURE  13, 36    HERNIORRHAPHY     [facility] NONSPECIFIC PROCEDURE  1985    VEIN STRIPPING      Allergies   Allergen Reactions     Cat Hair [Cats]      Pneumococcal Vaccine Rash      Social History     Tobacco Use     Smoking status: Never Smoker     Smokeless tobacco:  Never Used   Substance Use Topics     Alcohol use: Yes     Alcohol/week: 0.0 standard drinks     Comment: occasional      Wt Readings from Last 1 Encounters:   06/13/21 75.7 kg (166 lb 14.4 oz)        Anesthesia Evaluation   Pt has had prior anesthetic.     No history of anesthetic complications       ROS/MED HX  ENT/Pulmonary:    (-) tobacco use and sleep apnea   Neurologic:    (-) no seizures and no CVA   Cardiovascular:     (+) Dyslipidemia hypertension-----    METS/Exercise Tolerance:     Hematologic:     (+) History of blood clots, pt is anticoagulated,     Musculoskeletal:       GI/Hepatic:    (-) GERD and liver disease   Renal/Genitourinary:     (+) renal disease, type: ARF, Nephrolithiasis ,     Endo:       Psychiatric/Substance Use:       Infectious Disease:       Malignancy:   (+) Malignancy, History of Prostate and Skin.    Other:            Physical Exam    Airway        Mallampati: II   TM distance: > 3 FB   Neck ROM: full   Mouth opening: > 3 cm    Respiratory Devices and Support         Dental  no notable dental history         Cardiovascular   cardiovascular exam normal          Pulmonary   pulmonary exam normal                OUTSIDE LABS:  CBC:   Lab Results   Component Value Date    WBC 13.5 (H) 06/12/2021    WBC 6.4 07/31/2020    HGB 14.3 06/12/2021    HGB 14.2 07/31/2020    HCT 43.2 06/12/2021    HCT 41.9 07/31/2020     06/12/2021     07/31/2020     BMP:   Lab Results   Component Value Date     06/12/2021     04/12/2021    POTASSIUM 3.9 06/12/2021    POTASSIUM 4.4 04/12/2021    CHLORIDE 105 06/12/2021    CHLORIDE 109 04/12/2021    CO2 21 06/12/2021    CO2 26 04/12/2021    BUN 45 (H) 06/12/2021    BUN 31 (H) 04/12/2021    CR 2.85 (H) 06/12/2021    CR 1.19 04/12/2021     (H) 06/12/2021     (H) 04/12/2021     COAGS:   Lab Results   Component Value Date    INR 1.37 (H) 02/25/2020     POC: No results found for: BGM, HCG, HCGS  HEPATIC:   Lab Results   Component  Value Date    ALBUMIN 3.6 06/12/2021    PROTTOTAL 7.6 06/12/2021    ALT 16 06/12/2021    AST 9 06/12/2021    ALKPHOS 64 06/12/2021    BILITOTAL 1.8 (H) 06/12/2021     OTHER:   Lab Results   Component Value Date    LACT 0.6 (L) 01/02/2019    REY 9.6 06/12/2021    LIPASE 92 06/12/2021    TSH 0.78 07/31/2020    CRP 17.7 (H) 02/22/2020    SED 29 (H) 02/22/2020       Anesthesia Plan    ASA Status:  2   NPO Status:  NPO Appropriate    Anesthesia Type: General.     - Airway: LMA   Induction: Intravenous.   Maintenance: Balanced.        Consents    Anesthesia Plan(s) and associated risks, benefits, and realistic alternatives discussed. Questions answered and patient/representative(s) expressed understanding.     - Discussed with:  Patient         Postoperative Care    Pain management: Multi-modal analgesia.   PONV prophylaxis: Ondansetron (or other 5HT-3), Dexamethasone or Solumedrol, Background Propofol Infusion     Comments:                Adalberto Villa MD

## 2021-06-14 VITALS
HEART RATE: 66 BPM | RESPIRATION RATE: 24 BRPM | OXYGEN SATURATION: 98 % | HEIGHT: 69 IN | DIASTOLIC BLOOD PRESSURE: 47 MMHG | WEIGHT: 166.9 LBS | TEMPERATURE: 95.6 F | BODY MASS INDEX: 24.72 KG/M2 | SYSTOLIC BLOOD PRESSURE: 101 MMHG

## 2021-06-14 LAB
ALBUMIN SERPL-MCNC: 2.3 G/DL (ref 3.4–5)
ALP SERPL-CCNC: 44 U/L (ref 40–150)
ALT SERPL W P-5'-P-CCNC: 9 U/L (ref 0–70)
ANION GAP SERPL CALCULATED.3IONS-SCNC: 3 MMOL/L (ref 3–14)
AST SERPL W P-5'-P-CCNC: 9 U/L (ref 0–45)
BACTERIA SPEC CULT: NO GROWTH
BILIRUB DIRECT SERPL-MCNC: 0.1 MG/DL (ref 0–0.2)
BILIRUB SERPL-MCNC: 0.4 MG/DL (ref 0.2–1.3)
BUN SERPL-MCNC: 52 MG/DL (ref 7–30)
CALCIUM SERPL-MCNC: 7.8 MG/DL (ref 8.5–10.1)
CHLORIDE SERPL-SCNC: 112 MMOL/L (ref 94–109)
CO2 SERPL-SCNC: 25 MMOL/L (ref 20–32)
CREAT SERPL-MCNC: 2.07 MG/DL (ref 0.66–1.25)
ERYTHROCYTE [DISTWIDTH] IN BLOOD BY AUTOMATED COUNT: 12.6 % (ref 10–15)
GFR SERPL CREATININE-BSD FRML MDRD: 28 ML/MIN/{1.73_M2}
GLUCOSE SERPL-MCNC: 117 MG/DL (ref 70–99)
HCT VFR BLD AUTO: 32.8 % (ref 40–53)
HGB BLD-MCNC: 10.8 G/DL (ref 13.3–17.7)
LACTATE BLD-SCNC: 1.1 MMOL/L (ref 0.7–2)
Lab: NORMAL
MCH RBC QN AUTO: 32 PG (ref 26.5–33)
MCHC RBC AUTO-ENTMCNC: 32.9 G/DL (ref 31.5–36.5)
MCV RBC AUTO: 97 FL (ref 78–100)
PLATELET # BLD AUTO: 199 10E9/L (ref 150–450)
POTASSIUM SERPL-SCNC: 4.4 MMOL/L (ref 3.4–5.3)
PROT SERPL-MCNC: 5.5 G/DL (ref 6.8–8.8)
RBC # BLD AUTO: 3.37 10E12/L (ref 4.4–5.9)
SODIUM SERPL-SCNC: 140 MMOL/L (ref 133–144)
SPECIMEN SOURCE: NORMAL
WBC # BLD AUTO: 12.3 10E9/L (ref 4–11)

## 2021-06-14 PROCEDURE — 80048 BASIC METABOLIC PNL TOTAL CA: CPT | Performed by: UROLOGY

## 2021-06-14 PROCEDURE — 250N000013 HC RX MED GY IP 250 OP 250 PS 637: Performed by: UROLOGY

## 2021-06-14 PROCEDURE — 83605 ASSAY OF LACTIC ACID: CPT | Performed by: INTERNAL MEDICINE

## 2021-06-14 PROCEDURE — 85027 COMPLETE CBC AUTOMATED: CPT | Performed by: UROLOGY

## 2021-06-14 PROCEDURE — 258N000003 HC RX IP 258 OP 636: Performed by: UROLOGY

## 2021-06-14 PROCEDURE — 250N000011 HC RX IP 250 OP 636: Performed by: UROLOGY

## 2021-06-14 PROCEDURE — 99239 HOSP IP/OBS DSCHRG MGMT >30: CPT | Performed by: INTERNAL MEDICINE

## 2021-06-14 PROCEDURE — 80076 HEPATIC FUNCTION PANEL: CPT | Performed by: UROLOGY

## 2021-06-14 PROCEDURE — 36415 COLL VENOUS BLD VENIPUNCTURE: CPT | Performed by: INTERNAL MEDICINE

## 2021-06-14 PROCEDURE — 36415 COLL VENOUS BLD VENIPUNCTURE: CPT | Performed by: UROLOGY

## 2021-06-14 RX ORDER — CIPROFLOXACIN 250 MG/1
250 TABLET, FILM COATED ORAL EVERY 24 HOURS
Qty: 10 TABLET | Refills: 0 | Status: SHIPPED | OUTPATIENT
Start: 2021-06-14 | End: 2021-06-14

## 2021-06-14 RX ORDER — TAMSULOSIN HYDROCHLORIDE 0.4 MG/1
0.4 CAPSULE ORAL DAILY
Qty: 30 CAPSULE | Refills: 0 | Status: SHIPPED | OUTPATIENT
Start: 2021-06-15 | End: 2022-10-10

## 2021-06-14 RX ORDER — CIPROFLOXACIN 250 MG/1
250 TABLET, FILM COATED ORAL EVERY 24 HOURS
Qty: 9 TABLET | Refills: 0 | Status: SHIPPED | OUTPATIENT
Start: 2021-06-14 | End: 2021-06-23

## 2021-06-14 RX ORDER — CEFUROXIME AXETIL 500 MG/1
500 TABLET ORAL 2 TIMES DAILY
Qty: 20 TABLET | Refills: 0 | Status: SHIPPED | OUTPATIENT
Start: 2021-06-14 | End: 2021-06-14

## 2021-06-14 RX ADMIN — TAMSULOSIN HYDROCHLORIDE 0.4 MG: 0.4 CAPSULE ORAL at 09:27

## 2021-06-14 RX ADMIN — CEFTRIAXONE SODIUM 1 G: 1 INJECTION, POWDER, FOR SOLUTION INTRAMUSCULAR; INTRAVENOUS at 01:52

## 2021-06-14 RX ADMIN — DORZOLAMIDE HYDROCHLORIDE AND TIMOLOL MALEATE 1 DROP: 20; 5 SOLUTION/ DROPS OPHTHALMIC at 09:27

## 2021-06-14 RX ADMIN — SODIUM CHLORIDE: 9 INJECTION, SOLUTION INTRAVENOUS at 01:53

## 2021-06-14 RX ADMIN — SENNOSIDES AND DOCUSATE SODIUM 2 TABLET: 8.6; 5 TABLET ORAL at 11:19

## 2021-06-14 ASSESSMENT — ACTIVITIES OF DAILY LIVING (ADL)
ADLS_ACUITY_SCORE: 12

## 2021-06-14 NOTE — DISCHARGE SUMMARY
[facility]  Discharge Summary        Jose Ray MRN# [id number]   YOB: 1935 Age: 86 year old     Date of Admission: 6/12/2021  Date of Discharge: 6/14/2021  Admitting Physician: Michael Hinson DO  Discharge Physician: Wilfrido Orellana MD     Primary Provider: Hosea Lockett  Primary Care Physician Phone Number: [phone]         Discharge Diagnoses:   1. Right obstructive distal ureter stone with moderate right hydronephrosis with NORBERT - s/p right ureteral stent placement 6/13/2021.  2. Possible complicated UTI or pyelonephritis due to above.  3. NORBERT due to obstructive ureteral stone.        Other Chronic Medical Problems:      1. Hypertension (benign essential).  2. Right deep venous thrombosis history.       Allergies:         Allergies   Allergen Reactions     Cat Hair [Cats]      Pneumococcal Vaccine Rash           Discharge Medications:        Current Discharge Medication List      START taking these medications    Details   ciprofloxacin (CIPRO) 250 MG tablet Take 1 tablet (250 mg) by mouth every 24 hours for 9 days  Qty: 9 tablet, Refills: 0    Associated Diagnoses: Right ureteral stone; Acute pyelonephritis      tamsulosin (FLOMAX) 0.4 MG capsule Take 1 capsule (0.4 mg) by mouth daily  Qty: 30 capsule, Refills: 0    Associated Diagnoses: Right ureteral stone         CONTINUE these medications which have NOT CHANGED    Details   ascorbic acid 1000 MG TABS tablet Take 1,000 mg by mouth 2 times daily      Cholecalciferol (VITAMIN D PO) Take 1,000 Units by mouth daily       dorzolamide-timolol PF (COSOPT) 22.3-6.8 MG/ML opthalmic solution Place 1 drop into both eyes 2 times daily      ipratropium (ATROVENT) 0.06 % nasal spray Spray 2 sprays into both nostrils 2 times daily  Qty: 15 mL, Refills: 1    Associated Diagnoses: Rhinorrhea      latanoprost (XALATAN) 0.005 % ophthalmic solution Place 1 drop into both eyes daily   Qty:      Associated Diagnoses:  Examination of eyes and vision      MULTIPLE VITAMIN PO Take 1 tablet by mouth every morning      rivaroxaban ANTICOAGULANT (XARELTO ANTICOAGULANT) 20 MG TABS tablet Take 1 tablet (20 mg) by mouth daily (with dinner)  Qty: 90 tablet, Refills: 3    Associated Diagnoses: DVT, lower extremity, distal, acute, right (H)      triamcinolone (KENALOG) 0.1 % external cream Apply topically daily as needed for irritation (Back)       vitamin B complex with vitamin C (VITAMIN  B COMPLEX) tablet Take 1 tablet by mouth daily      zinc gluconate 50 MG tablet Take 50 mg by mouth daily         STOP taking these medications       hydrochlorothiazide (HYDRODIURIL) 12.5 MG tablet Comments:   Reason for Stopping:         losartan (COZAAR) 100 MG tablet Comments:   Reason for Stopping:                   Discharge Instructions and Follow-Up:      Discharge Orders      Follow-up and recommended labs and tests     You will have a follow up appointment with Dr. Martinez in 2 weeks for surgery to remove your kidney stone. Our office will call you specific day and time.    [facility], a division of [facility]  [address] [place] [address]. [id number]  You may call [phone] with any questions or concerns.   Central Appointment #: [phone]     Tubes and drains    You have a stent in your ureter. This stent is not permanent. It is very important that you return to your urologist to have your stent removed.     While the stent is in place you may expect some urinary urgency, frequency, and blood in your urine. You may also have some pain in your back or side while urinating. Avoid activities that make these symptoms worse. If these symptoms become severe or unbearable please contact our office.     The plan regarding your stent is to remove it 1-2 weeks after your kidney stone is removed     Reason for your hospital stay    Obstructive right ureteral stone.  Acute kidney injury.  Possible UTI or pyelonephritis.     Follow-up and  recommended labs and tests    Follow-up with primary care provider, Hosea Lockett, within 7 days for hospital follow-up.  The following labs/tests are recommended: CBC, BMP.     Activity    Your activity upon discharge: activity as tolerated     Diet    Follow this diet upon discharge: Orders Placed This Encounter      Regular Diet Adult             Consultations This Hospital Stay:      Urology ([facility]).        Admission History:      Please see the H&P by Michael Hinson DO on 6/12/2021 for complete details. Briefly, Mr. Ray is an 86-year-old male with a past medical history significant for hypertension, DVT on rivaroxaban, nephrolithiasis and h/o prostate cancer, who presented 6/12/2021 with abdominal pain and found with have Laura with imaging revealing right hydronephrosis due to a distal ureter stone.        Problem Oriented Hospital Course:        Right obstructive distal ureter stone with moderate right hydronephrosis with LAURA - s/p right ureteral stent placement 6/13/2021.  Possible complicated UTI or pyelonephritis due to above.  Pt with h/o nephrolithiasis. Presented 6/12 with abdominal pain. On initial evaluation 6/12, was afebrile, tachycardic; WBC 13.5, cr 2.85, UA with only 3 WBC, 17 RBC. CT abdomen 6/12 showed moderate right hydronephrosis caused by a 5 mm distal ureteral stone; fluid stranding about the right kidney and ureter noted; single 6 mm left intrarenal stone; cholelithiasis; colonic diverticula without acute diverticulitis; no bowel obstruction or inflammation. Started on IVF's and antibiotics on admit. Urology consulted and pt underwent procedure as above 6/13. Antibiotics changed to ciprofloxacin 6/13.  - Continue tamsulosin 0.4 mg daily.  - Continue ciprofloxacin (started 6/13) - stop after 6/22.  - Continue PRN acetaminophen.  - Follow-up with Dr. Martinez in 2 weeks for right URS with HLL; will need to establish plan for detectible PSA following prostatectomy at follow up with  Dr. Martinez.    NORBERT due to obstructive ureteral stone.  Cr 2.85 on admit 6/12. Found with obstructive right ureteral stone with moderate right hydronephrosis as above. Started on IVF's on admit. PTA HCTZ and losartan held on admit.  Recent Labs   Lab 06/14/21  0653 06/12/21  2227   CR 2.07* 2.85*   - Continue to hold HCTZ and losartan.   - Monitor BMP outpatient.  - Avoid nephrotoxic medications.    Hypertension (benign essential).  [PTA: HCTZ 12.5 mg daily; losartan 100 mg daily.]  HCTZ and losartan held on admit as above. BP's stable off meds on 6/14.  - Continue to hold HCTZ and losartan due to NORBERT.  - Follow-up renal function before restarting.    Right deep venous thrombosis.  * H/o right lower extremity DVT 2/2020. Maintained on rivaroxaban.  * Did not take evening 6/12 rivaroxaban dose. Rivaroxaban held on admit due to above.  - Resume rivaroxaban at discharge.    COVID-19 testing.  COVID-19 PCR Results    COVID-19 PCR Results 6/13/21   SARS-CoV-2 Virus Specimen Source Nasopharyngeal   SARS-CoV-2 PCR Result NEGATIVE      Comments are available for some flowsheets but are not being displayed.         COVID-19 Antibody Results, Testing for Immunity    COVID-19 Antibody Results, Testing for Immunity   No data to display.                   Pending Results:        Unresulted Labs Ordered in the Past 30 Days of this Admission     Date and Time Order Name Status Description    6/13/2021 1122 Urine Culture Aerobic Bacterial Preliminary                 Discharge Disposition:      Discharged to home.        Discharge Time:      Approximately 35 minutes.        Key Imaging Studies, Lab Findings and Procedures/Surgeries:        Results for orders placed or performed during the hospital encounter of 06/12/21   CT Abdomen Pelvis w Contrast    Narrative    EXAM: CT ABDOMEN PELVIS W CONTRAST  LOCATION: [facility]  DATE/TIME: 6/12/2021 11:03 PM    INDICATION: Abdominal distension.  COMPARISON:  10/14/2010.  TECHNIQUE: CT scan of the abdomen and pelvis was performed following injection of IV contrast. Multiplanar reformats were obtained. Dose reduction techniques were used.  CONTRAST: 86 mL Isovue-370.    FINDINGS:   LOWER CHEST: Dependent atelectasis at the lung bases.    HEPATOBILIARY: Stones in the gallbladder.    PANCREAS: Normal.    SPLEEN: Normal.    ADRENAL GLANDS: Normal.    KIDNEYS/BLADDER: There is moderate dilatation of the right renal collecting system and ureter into the pelvis where there is a 5 mm distal ureteral stone immediately above the ureterovesical junction. There is delayed nephrogram on the right consistent   with obstruction. Fluid stranding about the right kidney and ureter. Small cortical cyst at the posterior aspect of the right kidney. Minimally complex cyst at the anterior aspect of the left kidney. There is a 6 mm stone in the upper pole of the left   kidney.    BOWEL: There are colonic diverticula without acute diverticulitis. No bowel obstruction or inflammation.    LYMPH NODES: Normal.    VASCULATURE: Atherosclerotic calcification of the aorta and its branches. No aneurysm.    PELVIC ORGANS: Normal.    MUSCULOSKELETAL: Degenerative disease and scoliosis in the spine.      Impression    IMPRESSION:   1.  Moderate right hydronephrosis caused by a 5 mm distal ureteral stone.  2.  Single 6 mm left intrarenal stone.  3.  Cholelithiasis.  4.  Colonic diverticula without acute diverticulitis. No bowel obstruction or inflammation.   CT Pelvis Bone wo Contrast    Narrative    CT PELVIS BONE WITHOUT CONTRAST June 13, 2021 10:58 AM    CLINICAL HISTORY: Urinary tract stone, uncomplicated. Right distal  ureteral stone.    TECHNIQUE: CT scan of the pelvis was performed without IV contrast.  Multiplanar reformats were obtained. Dose reduction techniques were  used.  CONTRAST: No IV contrast administered during this exam. There is  residual contrast from the CT from  6/12/2021.    COMPARISON: CT abdomen and pelvis 6/12/2021.    FINDINGS:   PELVIC ORGANS: Dense contrast material within the bladder lumen, and  visualized right collecting system. Again noted is moderate right  hydronephrosis and hydroureter. This is associated with a stable  positioned 0.5 cm distal right ureter stone just proximal to the right  ureterovesical junction series 3 image 115. Left intrarenal stone  again noted that is stable on series 3 image 1. Remainder of the  pelvis shows no acute abnormality.    OTHER: A few colonic diverticula.    MUSCULOSKELETAL: No acute abnormality. Mild spine degenerative change.      Impression    IMPRESSION:   1.  No change in position of a 0.5 cm distal right ureter stone just  proximal to the right ureterovesical junction. No change of moderate  right hydronephrosis and hydroureter.  2.  Left intrarenal stone again noted.    DESTINI SWANN MD   XR Surgery MOHSEN Fluoro L/T 5 Min w Stills    Narrative    This exam was marked as non-reportable because it will not be read by a   radiologist or a [place] non-radiologist provider.           SURGERY 6/13/2021:  PROCEDURE PERFORMED:  Cystoscopy, right retrograde pyelogram, interpretation of fluoroscopic images, placement of 6 x 28 double-J ureteral stent.

## 2021-06-14 NOTE — PROGRESS NOTES
[facility]    Internal Medicine Hospitalist Progress Note  06/14/2021  I evaluated patient on the above date.    Wilfrido Orellana Jr., MD  [phone] (p)  Text Page        Assessment & Plan New actions/orders today (06/14/2021) are underlined.    Mr. Ray is an 86-year-old male with a past medical history significant for hypertension, DVT on rivaroxaban, nephrolithiasis and h/o prostate cancer, who presented 6/12/2021 with abdominal pain and found with have Laura with imaging revealing right hydronephrosis due to a distal ureter stone.      Right obstructive distal ureter stone with moderate right hydronephrosis with LAURA - s/p right ureteral stent placement 6/13/2021.  Possible complicated UTI or pyelonephritis due to above.  Pt with h/o nephrolithiasis. Presented 6/12 with abdominal pain. On initial evaluation 6/12, was afebrile, tachycardic; WBC 13.5, cr 2.85, UA with only 3 WBC, 17 RBC. CT abdomen 6/12 showed moderate right hydronephrosis caused by a 5 mm distal ureteral stone; fluid stranding about the right kidney and ureter noted; single 6 mm left intrarenal stone; cholelithiasis; colonic diverticula without acute diverticulitis; no bowel obstruction or inflammation. Started on IVF's and antibiotics on admit. Urology consulted and pt underwent procedure as above 6/13. Ciprofloxacin started 6/13.  - Discontinue ceftriaxone.  - Continue ciprofloxacin (started 6/13) - stop after 6/22  - Continue tamsulosin 0.4 mg daily.  - Continue PRN acetaminophen, PRN oxycodone-acetaminophen, PRN IV hydromorphone; minimize opioids as able.  - Monitor cultures.  - Follow-up with Dr. Martinez in 2 weeks for right URS with HLL; will need to establish plan for detectible PSA following prostatectomy at follow up with Dr. Martinez.    LAURA due to obstructive ureteral stone.  Cr 2.85 on admit 6/12. Found with obstructive right ureteral stone with moderate right hydronephrosis as above. Started on IVF's on admit. PTA  "HCTZ and losartan held on admit.  Recent Labs   Lab 06/14/21  0653 06/12/21  2227   CR 2.07* 2.85*   - Treat obstructive ureteral stone as above.  - Continue to hold HCTZ and losartan.  - Monitor BMP.  - Avoid nephrotoxic medications.    Hypertension (benign essential).  [PTA: HCTZ 12.5 mg daily; losartan 100 mg daily.]  HCTZ and losartan held on admit as above. BP's stable off meds on 6/14.  - Continue to hold HCTZ and losartan due to NORBERT.  - Continue PRN IV hydralazine.    Right deep venous thrombosis.  * H/o right lower extremity DVT 2/2020. Maintained on rivaroxaban.  * Did not take evening 6/12 rivaroxaban dose. Rivaroxaban held on admit due to above.  - Continue to hold rivaroxaban.    COVID-19 testing.  COVID-19 PCR Results    COVID-19 PCR Results 6/13/21   SARS-CoV-2 Virus Specimen Source Nasopharyngeal   SARS-CoV-2 PCR Result NEGATIVE      Comments are available for some flowsheets but are not being displayed.         COVID-19 Antibody Results, Testing for Immunity    COVID-19 Antibody Results, Testing for Immunity   No data to display.             Diet: Regular Diet Adult    Prophylaxis: PCD's, ambulation.   Lunsford Catheter: not present  Code Status: Full Code    Disposition Plan   Expected discharge: Today, recommended to prior living arrangement.  Entered: Wilfrido Orellana MD 06/14/2021, 2:11 PM         Interval History   Doing well.  No abdominal/flank pain.  Does note some constipation.    -Data reviewed today: I reviewed all new labs and imaging over the last 24 hours. I personally reviewed no images or EKG's today.    Physical Exam    , Blood pressure 91/46, pulse 65, temperature 96.7  F (35.9  C), temperature source Oral, resp. rate 16, height 1.753 m (5' 9\"), weight 75.7 kg (166 lb 14.4 oz), SpO2 95 %.  Vitals:    06/13/21 0153   Weight: 75.7 kg (166 lb 14.4 oz)     Vital Signs with Ranges  Temp:  [96.4  F (35.8  C)-96.7  F (35.9  C)] 96.7  F (35.9  C)  Pulse:  [65-73] 65  Resp:  [16-18] 16  BP: " ()/(46-59) 91/46  SpO2:  [94 %-98 %] 95 %  Patient Vitals for the past 24 hrs:   BP Temp Temp src Pulse Resp SpO2   06/14/21 0900 -- -- -- -- 16 --   06/14/21 0720 91/46 96.7  F (35.9  C) Oral 65 16 95 %   06/14/21 0048 101/52 96.4  F (35.8  C) Oral 72 18 98 %   06/13/21 1601 110/59 96.6  F (35.9  C) Oral 73 -- 94 %     I/O's Last 24 hours  I/O last 3 completed shifts:  In: 2860 [I.V.:2860]  Out: 1153 [Urine:1150; Blood:3]    Constitutional: Awake, alert, pleasant.  Respiratory: Diminished in bases. No crackles or wheezes.  Cardiovascular: RRR, no m/r/g.  GI: Soft, nt, nd, +BS.  Skin/Integumen:   Other:        Data   Recent Labs   Lab 06/14/21  0653 06/12/21 2227   WBC 12.3* 13.5*   HGB 10.8* 14.3   MCV 97 97    234    136   POTASSIUM 4.4 3.9   CHLORIDE 112* 105   CO2 25 21   BUN 52* 45*   CR 2.07* 2.85*   ANIONGAP 3 10   REY 7.8* 9.6   * 151*   ALBUMIN 2.3* 3.6   PROTTOTAL 5.5* 7.6   BILITOTAL 0.4 1.8*   ALKPHOS 44 64   ALT 9 16   AST 9 9   LIPASE  --  92     Recent Labs   Lab Test 06/14/21  0653 06/12/21  2227 04/12/21  1717 07/31/20  1505 02/22/20  2033   * 151* 136* 101* 98     No results for input(s): CRP, DD, LDH, FIBR, ROSIE, IL6B in the last 168 hours.      No results found for this or any previous visit (from the past 24 hour(s)).    Medications   All medications were reviewed.    sodium chloride 100 mL/hr at 06/14/21 0153       cefTRIAXone  1 g Intravenous Q24H     ciprofloxacin  250 mg Oral Q24H     dorzolamide-timolol  1 drop Both Eyes BID     latanoprost  1 drop Both Eyes Daily     sodium chloride (PF)  3 mL Intracatheter Q8H     tamsulosin  0.4 mg Oral Daily     acetaminophen, acetaminophen, bisacodyl, hydrALAZINE, HYDROmorphone, lidocaine 4%, lidocaine (buffered or not buffered), magnesium hydroxide, melatonin, naloxone **OR** naloxone **OR** naloxone **OR** naloxone, ondansetron **OR** ondansetron, oxyCODONE-acetaminophen, prochlorperazine **OR** prochlorperazine  **OR** prochlorperazine, senna-docusate **OR** senna-docusate, sodium chloride (PF)

## 2021-06-14 NOTE — PROVIDER NOTIFICATION
MD Notification    Notified Person: MD    Notified Person Name: Reyna    Notification Date/Time: 0745    Notification Interaction: Paged    Purpose of Notification: FYI lab called to report significant drop in Hgb over multiple days, if you would like a re-draw please order, thanks    Orders Received:    Comments:

## 2021-06-14 NOTE — PROVIDER NOTIFICATION
MD Notification    Notified Person: MD    Notified Person Name: Reyna    Notification Date/Time: 1410    Notification Interaction: Paged    Purpose of Notification: Lunsford removed this AM per urology, pt voiding with little post void residual, urology has signed off, would you like to discharge pt today? Thanks, Nancy RN *[phone]    Orders Received:    Comments:

## 2021-06-14 NOTE — PROGRESS NOTES
[facility]    Urology Progress Note     Assessment & Plan  Jose Ray is a 86 year old male POD#1 right ureteral stent placement for 5mm obstructing right UVJ stone and UTI. Afebrile for almost 24 hrs now. Ucx pending. On ceftriaxone and cipro.    Hx of prostatectomy and recently detectible PSA    Plan:    OK to remove pierce. BUS for PVR. If <350 can go without catheter    Culture specific Abx per IM Should have a 7-10 day course    Cont right ureteral stent    Follow up with Dr. Martinez in 2 weeks for right URS with HLL (avs updated)    Will need to establish plan for detectible PSA following prostatectomy at follow up with Dr. Martinez    Urology will sign off for now. Please re-consult if we can help in any way.     Discussed with Dr Michelle Clark PA-C 2021 9:57 AM  [facility]  Mon-Fri, 7am - 4pm  Office: [phone]      Interval History   Slept well overnight. No pain or fevers. No catheter complaints.    Physical Exam   Temp: 96.7  F (35.9  C) Temp src: Oral BP: 91/46 Pulse: 65   Resp: 16 SpO2: 95 % O2 Device: None (Room air) Oxygen Delivery: 2 LPM  Vitals:    21 0153   Weight: 75.7 kg (166 lb 14.4 oz)     Vital Signs with Ranges  Temp:  [96.4  F (35.8  C)-100.1  F (37.8  C)] 96.7  F (35.9  C)  Pulse:  [65-87] 65  Resp:  [10-24] 16  BP: ()/(46-83) 91/46  SpO2:  [93 %-98 %] 95 %  I/O last 3 completed shifts:  In: 2860 [I.V.:2860]  Out: 1153 [Urine:1150; Blood:3]    Temp (24hrs), Av.7  F (36.5  C), Min:96.4  F (35.8  C), Max:100.1  F (37.8  C)    GENERAL: Awake, alert, NAD. Lying in bed  NEURO: No facial asymmetry.  EYES: No icterus  HEAD, EARS, NOSE, MOUTH, AND THROAT: Atraumatic, normocephalic  NECK: Symmetric  CARDIAC: Skin well perfused  RESPIRATORY: Breathing unlabored  ABDOMEN: Non-distended  : Pierce in place draining clear bereket urine.   SKIN/HAIR/NAILS: No visible rashes  PSYCHIATRIC: Speech: normal Mood: normal Affect:  normal        Medications     sodium chloride 100 mL/hr at 06/14/21 0153       cefTRIAXone  1 g Intravenous Q24H     ciprofloxacin  250 mg Oral Q24H     dorzolamide-timolol  1 drop Both Eyes BID     latanoprost  1 drop Both Eyes Daily     sodium chloride (PF)  3 mL Intracatheter Q8H     tamsulosin  0.4 mg Oral Daily       Data   Results for orders placed or performed during the hospital encounter of 06/12/21 (from the past 24 hour(s))   CT Pelvis Bone wo Contrast    Narrative    CT PELVIS BONE WITHOUT CONTRAST June 13, 2021 10:58 AM    CLINICAL HISTORY: Urinary tract stone, uncomplicated. Right distal  ureteral stone.    TECHNIQUE: CT scan of the pelvis was performed without IV contrast.  Multiplanar reformats were obtained. Dose reduction techniques were  used.  CONTRAST: No IV contrast administered during this exam. There is  residual contrast from the CT from 6/12/2021.    COMPARISON: CT abdomen and pelvis 6/12/2021.    FINDINGS:   PELVIC ORGANS: Dense contrast material within the bladder lumen, and  visualized right collecting system. Again noted is moderate right  hydronephrosis and hydroureter. This is associated with a stable  positioned 0.5 cm distal right ureter stone just proximal to the right  ureterovesical junction series 3 image 115. Left intrarenal stone  again noted that is stable on series 3 image 1. Remainder of the  pelvis shows no acute abnormality.    OTHER: A few colonic diverticula.    MUSCULOSKELETAL: No acute abnormality. Mild spine degenerative change.      Impression    IMPRESSION:   1.  No change in position of a 0.5 cm distal right ureter stone just  proximal to the right ureterovesical junction. No change of moderate  right hydronephrosis and hydroureter.  2.  Left intrarenal stone again noted.    DESTINI SWANN MD   XR Surgery MOHSEN Fluoro L/T 5 Min w Stills    Narrative    This exam was marked as non-reportable because it will not be read by a   radiologist or a [place] non-radiologist  provider.         Urine Culture Aerobic Bacterial    Specimen: Urine catheter   Result Value Ref Range    Specimen Description Catheterized Urine     Special Requests Specimen received in preservative     Culture Micro PENDING    UA with Microscopic   Result Value Ref Range    Color Urine Straw     Appearance Urine Slightly Cloudy     Glucose Urine Negative NEG^Negative mg/dL    Bilirubin Urine Negative NEG^Negative    Ketones Urine Negative NEG^Negative mg/dL    Specific Gravity Urine 1.004 1.003 - 1.035    Blood Urine Large (A) NEG^Negative    pH Urine 5.0 5.0 - 7.0 pH    Protein Albumin Urine Negative NEG^Negative mg/dL    Urobilinogen mg/dL 0.0 0.0 - 2.0 mg/dL    Nitrite Urine Negative NEG^Negative    Leukocyte Esterase Urine Moderate (A) NEG^Negative    Source Catheterized Urine     WBC Urine 14 (H) 0 - 5 /HPF    RBC Urine >182 (H) 0 - 2 /HPF    Bacteria Urine Few (A) NEG^Negative /HPF    Yeast Urine Few (A) NEG^Negative /HPF    Squamous Epithelial /HPF Urine 0 0 - 1 /HPF    Mucous Urine Present (A) NEG^Negative /LPF   Basic metabolic panel   Result Value Ref Range    Sodium 140 133 - 144 mmol/L    Potassium 4.4 3.4 - 5.3 mmol/L    Chloride 112 (H) 94 - 109 mmol/L    Carbon Dioxide 25 20 - 32 mmol/L    Anion Gap 3 3 - 14 mmol/L    Glucose 117 (H) 70 - 99 mg/dL    Urea Nitrogen 52 (H) 7 - 30 mg/dL    Creatinine 2.07 (H) 0.66 - 1.25 mg/dL    GFR Estimate 28 (L) >60 mL/min/[1.73_m2]    GFR Estimate If Black 33 (L) >60 mL/min/[1.73_m2]    Calcium 7.8 (L) 8.5 - 10.1 mg/dL   CBC with platelets   Result Value Ref Range    WBC 12.3 (H) 4.0 - 11.0 10e9/L    RBC Count 3.37 (L) 4.4 - 5.9 10e12/L    Hemoglobin 10.8 (L) 13.3 - 17.7 g/dL    Hematocrit 32.8 (L) 40.0 - 53.0 %    MCV 97 78 - 100 fl    MCH 32.0 26.5 - 33.0 pg    MCHC 32.9 31.5 - 36.5 g/dL    RDW 12.6 10.0 - 15.0 %    Platelet Count 199 150 - 450 10e9/L   Hepatic panel   Result Value Ref Range    Bilirubin Direct 0.1 0.0 - 0.2 mg/dL    Bilirubin Total 0.4 0.2 -  1.3 mg/dL    Albumin 2.3 (L) 3.4 - 5.0 g/dL    Protein Total 5.5 (L) 6.8 - 8.8 g/dL    Alkaline Phosphatase 44 40 - 150 U/L    ALT 9 0 - 70 U/L    AST 9 0 - 45 U/L

## 2021-06-14 NOTE — CONSULTS
Care Management Initial Consult    General Information  Assessment completed with: Patient, (Salvador)  Type of CM/SW Visit: Initial Assessment    Primary Care Provider verified and updated as needed: Yes   Readmission within the last 30 days: no previous admission in last 30 days      Reason for Consult: discharge planning  Advance Care Planning:            Communication Assessment  Patient's communication style: spoken language (English or Bilingual)    Hearing Difficulty or Deaf: no   Wear Glasses or Blind: no    Cognitive  Cognitive/Neuro/Behavioral: WDL  Level of Consciousness: alert  Arousal Level: opens eyes spontaneously  Orientation: oriented x 4     Best Language: 0 - No aphasia  Speech: spontaneous, clear, logical    Living Environment:   People in home: spouse  (Usha)  Current living Arrangements: house      Able to return to prior arrangements: yes       Family/Social Support:  Care provided by: self  Provides care for: no one  Marital Status:   Wife  (Usha)       Description of Support System: Supportive         Current Resources:   Patient receiving home care services: No     Community Resources: None  Equipment currently used at home: none  Supplies currently used at home: None    Employment/Financial:  Employment Status: retired        Financial Concerns:             Lifestyle & Psychosocial Needs:        Socioeconomic History     Marital status:      Spouse name: Not on file     Number of children: 3     Years of education: Not on file     Highest education level: Not on file   Occupational History     Occupation: [employer]     Employer: RETIRED     Tobacco Use     Smoking status: Never Smoker     Smokeless tobacco: Never Used   Substance and Sexual Activity     Alcohol use: Yes     Alcohol/week: 0.0 standard drinks     Comment: occasional     Drug use: No     Sexual activity: Not Currently     Partners: Male       Functional Status:  Prior to admission patient needed  assistance:              Mental Health Status:          Chemical Dependency Status:                Values/Beliefs:  Spiritual, Cultural Beliefs, Faith Practices, Values that affect care:                 Additional Information:  Per consult for elevated risk for readmission, met with patient to discuss discharge plans.  Per pt, he has been independent with all ADLs and still drives to his medical appointments.  Patient shared he has no discharge needs.  Discussed PCP follow-up and pt ok with Virtual appt being scheduled for him.  Discussed Urology follow-up and that this clinic will call pt with his scheduled appt date/time.  Scheduled PCP follow-up and appt date/time on AVS.  Care Management Discharge Note    Discharge Date: 06/14/21(home)       Discharge Disposition: Home    Discharge Services: None    Discharge DME: None    Discharge Transportation: family or friend will provide    Private pay costs discussed: Not applicable    PAS Confirmation Code:    Patient/family educated on Medicare website which has current facility and service quality ratings: (N/A)    Education Provided on the Discharge Plan:    Persons Notified of Discharge Plans: bedside RN  Patient/Family in Agreement with the Plan: yes    Handoff Referral Completed: Yes    Additional Information:  Patient ready for discharge home.   Patient's spouse will be able to transport patient home.    NIKITA Martinez RN, BSN, OCN   Inpatient Care Coordination [address]  Office: [phone]

## 2021-06-14 NOTE — OP NOTE
Procedure Date: 06/13/2021    SURGEON:  Rishi Vincent MD    PREOPERATIVE DIAGNOSIS:  Right distal ureteral stones.    POSTOPERATIVE DIAGNOSIS:    1.  Right distal ureteral stone.  2.  Urinary tract infection.    PROCEDURE PERFORMED:  Cystoscopy, right retrograde pyelogram, interpretation of fluoroscopic images, placement of 6 x 28 double-J ureteral stent.    ANESTHESIA:  general     COMPLICATIONS:  None.    INDICATIONS FOR PROCEDURE:  Salvador Ray is an 86-year-old gentleman with a distal right ureteral stone.  He wishes to undergo stone removal.  His urinalysis appears normal, and he has been afebrile.  He now presents for a planned ureteroscopy in order to extract the stone along with a stent placement.    FINDINGS:  The urine was found to be infected.  I placed a stent only.    DESCRIPTION OF PROCEDURE:  Risks and benefits were explained in detail to the patient, and informed consent was obtained.  The patient was brought to the operating room and placed supine on the table under general anesthetic.  He was then moved down to the dorsal lithotomy position.  He was prepped and draped in sterile fashion.    I began the procedure by introducing a 22-Slovak cystoscope through the urethra and bladder and performed a cystoscopy.  The prostate was absent.  The bladder was normal throughout.  I cannulated the right ureteral catheter and performed retrograde pyelogram.  There was actually still contrast in the right kidney from his previous CT scan, and there was hydronephrosis, hydroureter down to the distal ureter.  I passed a Glidewire into the right kidney and backed off the ureteral catheter.  Just as I passed the Glidewire into the distal ureter, a great deal of dark, infected-appearing urine came protruding out of the right ureteral orifice with a fairly strong jet.  Due to this infected-appearing urine, I decided to simply place a stent.  I then passed a 6 x 28 double-J ureteral stent over the wire.  The  wire was pulled back, and a good curl could be seen in the renal pelvis under fluoroscopy.  A good curl was seen in the bladder under direct visualization.  I visualized the stent and the more infected urine jetting out through the stent.  I collected a urine sample from the bladder through the scope and sent it for culture.  Due to the infection, I decided to place a Lunsford catheter overnight.  I removed the scope and drained the bladder with a 16-Guinean Lunsford catheter.  The procedure was concluded at this point.    The patient tolerated the procedure well without complications.  He went to post-anesthetic care unit in good condition.  He will go to the hospital for further monitoring and antibiotics from there.  His Lunsford catheter can be removed tomorrow.  He will require a followup procedure in order to remove his distal ureteral stone as well as a stent once h has been treated for his urinary tract infection.    Rishi Vincent MD        D: 2021   T: 2021   MT: TRENA/DCQA    Name:     SHAN VALERA  MRN:      [id number]        Account:        [id number]   :      1935           Procedure Date: 2021     Document: [id number]

## 2021-06-15 ENCOUNTER — TELEPHONE (OUTPATIENT)
Dept: FAMILY MEDICINE | Facility: CLINIC | Age: 86
End: 2021-06-15

## 2021-06-17 NOTE — TELEPHONE ENCOUNTER
"PCP: SADE patient has appt scheduled with you tomorrow (6/18/21) for hospital follow up , patient had question about his xarelto, states he has not been on xaretlo for 5 days,clarify if should start, per discharge note says to continue medication. When should patient restart xarelto? Going to have stent removal in first week of July (not scheduled).     ED/Discharge Protocol    \"Hi, my name is Ayush Jenkins RN, a registered nurse, and I am calling on behalf of Dr. Torres's office at [place].  I am calling to follow up and see how things are going for you after your recent visit.\"    \"I see that you were in the (ER/UC/IP).    How are you doing now that you are home?\"     Is patient experiencing symptoms that may require a hospital visit?      \"dragging along\", \"doing fine\"    Discharge Instructions    \"Let's review your discharge instructions.  What is/are the follow-up recommendations?  You will have a follow up appointment with Dr. Martinez in 2 weeks for surgery to remove your kidney stone. Our office will call you specific day and time.- July 1 or after    Preop appt scheduled with marquise on 6/21/21    Follow-up with primary care provider, Hosea Lockett, within 7 days for hospital follow-up.  The following labs/tests are recommended: CBC, BMP.- has atp 6/18/21         \"Were you instructed to make a follow-up appointment?\"  Pt. Response: Yes.  Has appointment been made?   Yes      \"When you see the provider, I would recommend that you bring your discharge instructions with you.    Medications    \"How many new medications are you on since your hospitalization/ED visit?\"    0-1 - flomax  \"How many of your current medicines changed (dose, timing, name, etc.) while you were in the hospital/ED visit?\"   2 or more - [facility] MTM referral needed  START taking these medications     Details   ciprofloxacin (CIPRO) 250 MG tablet Take 1 tablet (250 mg) by mouth every 24 hours for 9 days  Qty: 9 tablet, Refills: 0     Associated " "Diagnoses: Right ureteral stone; Acute pyelonephritis       tamsulosin (FLOMAX) 0.4 MG capsule Take 1 capsule (0.4 mg) by mouth daily  Qty: 30 capsule, Refills: 0     Associated Diagnoses: Right ureteral stone     \"Do you have questions about your medications?\"   No- just questions about ressuming xarelto, hydrochlorothiazide and losartan              STOP taking these medications         hydrochlorothiazide (HYDRODIURIL) 12.5 MG tablet Comments:   Reason for Stopping:            losartan (COZAAR) 100 MG tablet Comments:   Reason for Stopping:                \"Were you newly diagnosed with heart failure, COPD, diabetes or did you have a heart attack?\"   No  For patients on insulin: \"Did you start on insulin in the hospital or did you have your insulin dose changed?\"   No  Post Discharge Medication Reconciliation Status: discharge medications reconciled and changed, per note/orders.    Was MTM referral placed (*Make sure to put transitions as reason for referral)?   No    Call Summary    \"Do you have any questions or concerns about your condition or care plan at the moment?\"    No  \"If you have questions or things don't continue to improve, we encourage you contact us through the main clinic number,  ([phone])  .  Even if the clinic is not open, triage nurses are available 24/7 to help you.     We would like you to know that our clinic has extended hours (provide information).  We also have urgent care (provide details on closest location and hours/contact info)\"      \"Thank you for your time and take care!\"      Ayush Jenkins RN  [facility]          "

## 2021-06-18 ENCOUNTER — VIRTUAL VISIT (OUTPATIENT)
Dept: FAMILY MEDICINE | Facility: CLINIC | Age: 86
End: 2021-06-18
Payer: COMMERCIAL

## 2021-06-18 DIAGNOSIS — N17.0 ACUTE RENAL FAILURE WITH TUBULAR NECROSIS (H): ICD-10-CM

## 2021-06-18 DIAGNOSIS — N20.1 RIGHT URETERAL STONE: Primary | ICD-10-CM

## 2021-06-18 DIAGNOSIS — I10 BENIGN ESSENTIAL HYPERTENSION: ICD-10-CM

## 2021-06-18 DIAGNOSIS — I82.4Z1 DVT, LOWER EXTREMITY, DISTAL, ACUTE, RIGHT (H): ICD-10-CM

## 2021-06-18 DIAGNOSIS — Z11.59 ENCOUNTER FOR SCREENING FOR OTHER VIRAL DISEASES: ICD-10-CM

## 2021-06-18 PROCEDURE — 99495 TRANSJ CARE MGMT MOD F2F 14D: CPT | Performed by: INTERNAL MEDICINE

## 2021-06-18 NOTE — PROGRESS NOTES
Salvador is a 86 year old who is being evaluated via a billable video visit.      How would you like to obtain your AVS? MyChart  If the video visit is dropped, the invitation should be resent by: Text to cell phone: [phone]  Will anyone else be joining your video visit? No      Video Start Time: 9:58        Subjective   Salvador is a 86 year old who presents for the following health issues  accompanied by his spouse:    [facility]       Hospital Follow-up Visit:    Hospital/Nursing Home/IP Rehab Facility: [facility]  Date of Admission: 06/12/2021  Date of Discharge: 06/14/2021  Reason(s) for Admission:     1. Right obstructive distal ureter stone with moderate right hydronephrosis with NORBERT - s/p right ureteral stent placement 6/13/2021.  2. Possible complicated UTI or pyelonephritis due to above.  3. NORBERT due to obstructive ureteral stone.      Was your hospitalization related to COVID-19? No   Problems taking medications regularly:  None  Medication changes since discharge: None  Problems adhering to non-medication therapy:  None    Summary of hospitalization:  [facility] discharge summary reviewed  Diagnostic Tests/Treatments reviewed.  Follow up needed: labs and pre op Monday    Other Healthcare Providers Involved in Patient s Care:         Specialist appointment - dr garcia  Update since discharge: improved.       Post Discharge Medication Reconciliation: discharge medications reconciled and changed, per note/orders.  Plan of care communicated with patient and family              This is a hospital follow-up done via video with the wife present.  I did review the hospital notes as well as imaging and labs.    As noted, the patient presented with abdominal pain, acute kidney injury, right hydronephrosis due to a stone.  He was given IV fluids and antibiotics and a stent was put in place.  He was seen by urology obviously for this and will be following up with them for further procedures in the  near future.  On discharge his creatinine had improved but is not back to baseline.    Because of the acute kidney injury his hydrochlorothiazide and losartan were placed on hold.  His blood pressure was adequate.    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  This was also put on hold and he has not resumed it.    He is doing much better at this time without abdominal pain or nausea.  No fevers.  His bowels are fine.  He is somewhat weak but overall is improved.  He wonders about what to do with his medications.    Past Medical History:   Diagnosis Date     Acute renal failure with tubular necrosis (H) 06/2021    due to renal stone and hydro     Adenomatous colon polyp 01/2019     Complex renal cyst 06/2014    seen on us done for htn, fu 6 months, fu done 8/15 and stable; fu 6/18 simple cyst, a bit larger     Diverticular hemorrhage 01/2019    colonic, hosp fsd     DVT, lower extremity, distal, acute, right (H) 02/2020, 1996    gastrocnemius vein in 2020, not sure which vein in 1996, factor 5 leiden neg 2020     Eczema      Gallstone 2010    seen on ct for renal stone     Generalized osteoarthrosis, unspecified site      HTN (hypertension) 06/2014    us done and no hydro but renal cyst, had edema with norvasc 5mg, added hctz and stopped norvasc 11/20     Hx of colonoscopy 2011    nl     Hydronephrosis of left kidney 2010    seen on above ct     Nephrolithiasis 2010    seen on ct, hydronephrosis seen as well; had richy and hydro 6/21, stenting done.     Prostate cancer (H) 2008    had surgery, Zohra Martinez     Skin cancer, basal cell 2002, 2008    had xrt 2002, mohs surgery 2008     Thrombophlebitis 1985     Past Surgical History:   Procedure Laterality Date     CIRCUMCISION  3/12/2013    Procedure: CIRCUMCISION;  CIRCUMCISION;  Surgeon: Stevenson Martinez MD;  Location: [facility]     COLONOSCOPY  2011; 2019    [facility]     HERNIA REPAIR  1961    Hernia     LASER HOLMIUM  LITHOTRIPSY URETER(S), INSERT STENT, COMBINED Right 6/13/2021    Procedure: Cystoscopy, right ureter stent placement;  Surgeon: Rishi Vincent MD;  Location:  OR     mohs surgery  2008, 2011    basal cell     TURP  2008    prostate cancer     VASCULAR SURGERY  1985    vein stripping both legs some     ZZC NONSPECIFIC PROCEDURE  14, 36    HERNIORRHAPHY     ZZC NONSPECIFIC PROCEDURE  1985    VEIN STRIPPING     Social History     Socioeconomic History     Marital status:      Spouse name: Not on file     Number of children: 3     Years of education: Not on file     Highest education level: Not on file   Occupational History     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   Social Needs     Financial resource strain: Not on file     Food insecurity     Worry: Not on file     Inability: Not on file     Transportation needs     Medical: Not on file     Non-medical: Not on file   Tobacco Use     Smoking status: Never Smoker     Smokeless tobacco: Never Used   Substance and Sexual Activity     Alcohol use: Yes     Alcohol/week: 0.0 standard drinks     Comment: occasional     Drug use: No     Sexual activity: Not Currently     Partners: Male   Lifestyle     Physical activity     Days per week: Not on file     Minutes per session: Not on file     Stress: Not on file   Relationships     Social connections     Talks on phone: Not on file     Gets together: Not on file     Attends Zoroastrianism service: Not on file     Active member of club or organization: Not on file     Attends meetings of clubs or organizations: Not on file     Relationship status: Not on file     Intimate partner violence     Fear of current or ex partner: Not on file     Emotionally abused: Not on file     Physically abused: Not on file     Forced sexual activity: Not on file   Other Topics Concern     Parent/sibling w/ CABG, MI or angioplasty before 65F 55M? No   Social History Narrative     Not on file     Current Outpatient  Medications   Medication Sig Dispense Refill     ascorbic acid 1000 MG TABS tablet Take 1,000 mg by mouth 2 times daily       Cholecalciferol (VITAMIN D PO) Take 1,000 Units by mouth daily        ciprofloxacin (CIPRO) 250 MG tablet Take 1 tablet (250 mg) by mouth every 24 hours for 9 days 9 tablet 0     dorzolamide-timolol PF (COSOPT) 22.3-6.8 MG/ML opthalmic solution Place 1 drop into both eyes 2 times daily       ipratropium (ATROVENT) 0.06 % nasal spray Spray 2 sprays into both nostrils 2 times daily 15 mL 1     latanoprost (XALATAN) 0.005 % ophthalmic solution Place 1 drop into both eyes daily        MULTIPLE VITAMIN PO Take 1 tablet by mouth every morning       rivaroxaban ANTICOAGULANT (XARELTO ANTICOAGULANT) 20 MG TABS tablet Take 1 tablet (20 mg) by mouth daily (with dinner) 90 tablet 3     tamsulosin (FLOMAX) 0.4 MG capsule Take 1 capsule (0.4 mg) by mouth daily 30 capsule 0     triamcinolone (KENALOG) 0.1 % external cream Apply topically daily as needed for irritation (Back)        vitamin B complex with vitamin C (VITAMIN  B COMPLEX) tablet Take 1 tablet by mouth daily       zinc gluconate 50 MG tablet Take 50 mg by mouth daily       Allergies   Allergen Reactions     Cat Hair [Cats]      Pneumococcal Vaccine Rash     FAMILY HISTORY NOTED AND REVIEWED    REVIEW OF SYSTEMS: above    PHYSICAL EXAM    There were no vitals taken for this visit.    Patient appears non toxic, normal affect  Normal respir rate  Skin appears normal  Eyes appear normal    ASSESSMENT:  1. Arf, due to obstruction  2. Acute hydro, due to renal stone  3. Renal stone  4. Hypertension, off blood pressure meds for now  5. Recurrent dvt, needs to resume xarelto, hold 3 days prior to upcoming procedure    PLAN:  Follow up Monday and labs then  Hold hydrochlorothiazide and losartan for now  Resume xarelto, stop 3 days prior to procedure  If ill over weekend to emergency room    Hosea Lockett M.D.    Video end 10:10  He is at  home  doximity    Hosea Lockett M.D.

## 2021-06-21 ENCOUNTER — OFFICE VISIT (OUTPATIENT)
Dept: FAMILY MEDICINE | Facility: CLINIC | Age: 86
End: 2021-06-21
Payer: COMMERCIAL

## 2021-06-21 VITALS
HEIGHT: 69 IN | BODY MASS INDEX: 25.56 KG/M2 | WEIGHT: 172.6 LBS | DIASTOLIC BLOOD PRESSURE: 86 MMHG | SYSTOLIC BLOOD PRESSURE: 126 MMHG | TEMPERATURE: 97.9 F | HEART RATE: 58 BPM | OXYGEN SATURATION: 100 %

## 2021-06-21 DIAGNOSIS — N20.1 RIGHT URETERAL STONE: ICD-10-CM

## 2021-06-21 DIAGNOSIS — Z01.818 PREOP GENERAL PHYSICAL EXAM: Primary | ICD-10-CM

## 2021-06-21 LAB
ERYTHROCYTE [DISTWIDTH] IN BLOOD BY AUTOMATED COUNT: 12.6 % (ref 10–15)
HCT VFR BLD AUTO: 41.9 % (ref 40–53)
HGB BLD-MCNC: 14.1 G/DL (ref 13.3–17.7)
IRON STUDY JIC TUBE: ABNORMAL
MCH RBC QN AUTO: 33 PG (ref 26.5–33)
MCHC RBC AUTO-ENTMCNC: 33.7 G/DL (ref 31.5–36.5)
MCV RBC AUTO: 98 FL (ref 78–100)
PLATELET # BLD AUTO: 378 10E9/L (ref 150–450)
RBC # BLD AUTO: 4.27 10E12/L (ref 4.4–5.9)
WBC # BLD AUTO: 8.1 10E9/L (ref 4–11)

## 2021-06-21 PROCEDURE — 99214 OFFICE O/P EST MOD 30 MIN: CPT | Performed by: NURSE PRACTITIONER

## 2021-06-21 PROCEDURE — 85027 COMPLETE CBC AUTOMATED: CPT | Performed by: NURSE PRACTITIONER

## 2021-06-21 PROCEDURE — 80048 BASIC METABOLIC PNL TOTAL CA: CPT | Performed by: NURSE PRACTITIONER

## 2021-06-21 PROCEDURE — 36415 COLL VENOUS BLD VENIPUNCTURE: CPT | Performed by: NURSE PRACTITIONER

## 2021-06-21 ASSESSMENT — MIFFLIN-ST. JEOR: SCORE: 1458.29

## 2021-06-21 NOTE — PATIENT INSTRUCTIONS
Preparing for Your Surgery  Getting started  A nurse will call you to review your health history and instructions. They will give you an arrival time based on your scheduled surgery time.  Please be ready to share the following:    Your doctor's clinic name and phone number    Your medical, surgical and anesthesia history    A list of allergies and sensitivities    A list of medicines, including herbal treatments and over-the-counter drugs    Whether the patient has a legal guardian (ask how to send us the papers in advance)  If you have a child who's having surgery, please ask for a copy of Preparing for Your Child's Surgery.    Preparing for surgery    Within 30 days of surgery: Have a pre-op exam (sometimes called an H&P, or History and Physical). This can be done at a clinic or pre-operative center.  ? If you're having a , you may not need this exam. Talk to your care team    At your pre-op exam, talk to your care team about all medicines you take. If you need to stop any medicines before surgery, ask when to start taking them again.  ? We do this for your safety. Many medicines can make you bleed too much during surgery. Some change how well surgery (anesthesia) drugs work.    Call your insurance company to let them know you're having surgery. (If you don't have insurance, call [phone].)    Call your clinic if there's any change in your health. This includes signs of a cold or flu (sore throat, runny nose, cough, rash, fever). It also includes a scrape or scratch near the surgery site.    If you have questions on the day of surgery, call your hospital or surgery center.  Eating and drinking guidelines  For your safety: Unless your surgeon tells you otherwise, follow the guidelines below.    Eat and drink as usual until 8 hours before surgery. After that, no food or milk.    Drink clear liquids until 2 hours before surgery. These are liquids you can see through, like water, Gatorade and Propel  Water. You may also have black coffee and tea (no cream or milk).    Nothing by mouth within 2 hours of surgery. This includes gum, candy and breath mints.    If you drink, stop drinking alcohol the night before surgery.    If your care team tells you to take medicine on the morning of surgery, it's okay to take it with a sip of water.  Preventing infection    Shower or bathe the night before and morning of your surgery. Follow the instructions your clinic gave you. (If no instructions, use regular soap.)    Don't shave or clip hair near your surgery site. We'll remove the hair if needed.    Don't smoke or vape the morning of surgery. You may chew nicotine gum up to 2 hours before surgery. A nicotine patch is okay.  ? Note: Some surgeries require you to completely quit smoking and nicotine. Check with your surgeon.    Your care team will make every effort to keep you safe from infection. We will:  ? Clean our hands often with soap and water (or an alcohol-based hand rub).  ? Clean the skin at your surgery site with a special soap that kills germs.  ? Give you a special gown to keep you warm. (Cold raises the risk of infection.)  ? Wear special hair covers, masks, gowns and gloves during surgery.  ? Give antibiotic medicine, if prescribed. Not all surgeries need antibiotics.  What to bring on the day of surgery    Photo ID and insurance card    Copy of your health care directive, if you have one    Glasses and hearing aides (bring cases)  ? You can't wear contacts during surgery    Inhaler and eye drops, if you use them (tell us about these when you arrive)    CPAP machine or breathing device, if you use them    A few personal items, if spending the night    If you have . . .  ? A pacemaker or ICD (cardiac defibrillator): Bring the ID card.  ? An implanted stimulator: Bring the remote control.  ? A legal guardian: Bring a copy of the certified (court-stamped) guardianship papers.  Please remove any jewelry, including  body piercings. Leave jewelry and other valuables at home.  If you're going home the day of surgery  Important: If you don't follow the rules below, we must cancel your surgery.     Arrange for someone to drive you home after surgery. You may not drive, take a taxi or take public transportation by yourself (unless you'll have local anesthesia only).    Arrange for a responsible adult to stay with you overnight. If you don't, we may keep you in the hospital overnight, and you may need to pay the costs yourself.  Questions?   If you have any questions for your care team, list them here: _________________________________________________________________________________________________________________________________________________________________________________________________________________________________________________________________________________________________________________________  For informational purposes only. Not to replace the advice of your health care provider. Copyright   2003, 2019 [employer]. All rights reserved. Clinically reviewed by Aletha Jones MD. [employer] 967669 - REV 4/20.    Before Your Procedure or Hospital Admission  Testing for COVID-19 (Coronavirus)  Thank you for choosing [facility] for your health care needs. This is a very challenging time for everyone. The World Health Organization and the [facility] have declared the COVID-19 virus a pandemic.   Our goal is to keep you and our team here at [facility] safe and healthy. We've taken several steps to make this happen. For example:    We screen our staff, care teams and patients for COVID-19.    Everyone at [facility] must wear a mask and stay 6 feet apart.    We are limiting hospital and clinic visitors.  Before you come in  All patients must get tested for COVID-19. Your test needs to happen 2 to 4 days before you check in to the hospital or surgery site.   A clinic scheduler will call  "about a week in advance to set up a testing time at one of our labs where we'll take a swab of your nose or throat.  Note: If you go to a clinic or pharmacy like [facility] or [email or web address] for your test, make sure it's a \"RT-PCR\" test, not a \"rapid\" COVID-19 test. (See Questions and Answers below.)  After the test, please stay at home and stay out of contact with other people. It will be harder for you to recover if you get COVID-19 before your treatment.  Please follow all current safety guidelines, including:    Limit trips outside your home.    Limit the number of people you see.    Always wear a mask outside your home.    Use social distancing. (Stay 6 feet away from others whenever you can.)    Wash your hands often.  If your test shows you have COVID-19  If your test is positive, we'll let you know. A positive test means that you have the virus.   We'll probably have to postpone your admission, surgery or procedure. Your doctor will discuss this with you. After that, we'll let you know what to do and when you can reschedule.   We may need to cancel your treatment on short notice for other reasons, too.  If your test shows you DON'T have COVID-19  Even if your test is negative, you may still get COVID-19. It's rare but, sometimes, the test result is wrong. You could also catch the virus after taking the test.   There's a very small chance that you could catch COVID-19 in the hospital or surgery center. [facility] has taken many steps to prevent this from happening.   Day of your surgery or procedure    Please come wearing a mask or something else that covers both your nose and mouth.    When you arrive, we'll ask you some questions to find out if you have any signs or symptoms of COVID-19.    Ask your care team if you can have visitors. All visitors must wear masks and will be screened for signs of COVID-19.  ? Even if no visitors are allowed, you can still have with you:    Your legal guardian or legal decision " "maker    A parent and one other visitor, if you are younger than 18 years old    A partner and a , if you are in labor  ? We might need to teach you about taking care of yourself after surgery. If so, a visitor can come into the hospital to learn about it, too.  ? The rules for visitors change often, depending on how much the virus is spreading. To learn more, see Visiting a Loved One in the Hospital during the COVID-19 Outbreak.  Please call your care team, hospital or surgery center if you have any questions. We thank you for your understanding and for choosing [facility] for your care.   Questions and Answers  Does it matter where I get tested for COVID-19?  Yes. We urge you to get tested at one of our [facility] COVID-19 testing sites. We process these tests in our lab and can get the results quickly. Your [facility] care team needs to get your results before you check in.  What should I do if I can't get tested at [facility]?  You can get tested somewhere else, but you'll need to take these extra steps:  1. Contact your family doctor or clinic to arrange your test.  2. Take the test within 4 days of your surgery or procedure. We can't accept tests older than 4 days.  3. Make sure your doctor or clinic faxes your results to [facility] at [phone].  If we don't get your results in time, we may have to postpone or cancel your treatment.  Ask if you're getting a \"RT-PCR\" COVID-19 test. It should NOT be a \"rapid\" COVID-19 test. Many drug stores use \"rapid\" tests, but they may not be as accurate. We don't accept the results of \"rapid\" tests.  For informational purposes only. Not to replace the advice of your health care provider. Copyright   2020 [facility]. All rights reserved. Clinically reviewed by Infection Prevention and the [facility] COVID-19 Clinical Team. [employer] 550983 - REV 10/20.      HOLD xarelto 2 days before surgery. Last dose will be " Tuesday 6/29    Hold vitamins morning of surgery

## 2021-06-21 NOTE — PROGRESS NOTES
[address], [address]  [facility] [address]  Phone: [phone]  Primary Provider: Hosea Lockett  Pre-op Performing Provider: SUDHA SANDOVAL      PREOPERATIVE EVALUATION:  Today's date: 6/21/2021    Jose Ray is a 86 year old male who presents for a preoperative evaluation.    Surgical Information:  Surgery/Procedure: CYSTOSCOPY, RIGHT URETEROSCOPY, HOLMIUM LASER LITHOTRIPSY, AND POSSIBLE URETERAL STENT EXCHANGE VERSUS REMOVAL  Surgery Location:  OR  Surgeon: Stevenson Martinez MD  Surgery Date: 7-2-2021  Time of Surgery: tbd  Where patient plans to recover: At home with family  Fax number for surgical facility: Note does not need to be faxed, will be available electronically in Epic.    Type of Anesthesia Anticipated: General    Assessment & Plan     The proposed surgical procedure is considered INTERMEDIATE risk.    Problem List Items Addressed This Visit     Right ureteral stone      Other Visit Diagnoses     Preop general physical exam    -  Primary    Relevant Orders    Basic Metabolic Panel (Completed)    CBC w/ Reflex to Iron Studies (Completed)               Risks and Recommendations:  The patient has the following additional risks and recommendations for perioperative complications:   - No identified additional risk factors other than previously addressed    Medication Instructions:  Patient is to take all scheduled medications on the day of surgery EXCEPT for modifications listed below:   - apixaban (Eliquis), edoxaban (Savaysa), rivaroxaban (Xarelto): CrCl <50 mL/min. HOLD 48 hours before procedure  Hold Vitamins morning of surgery       RECOMMENDATION:  APPROVAL GIVEN to proceed with proposed procedure, without further diagnostic evaluation.        Subjective     HPI related to upcoming procedure: recent obstructive stone with stent placement on 6/13. Now is going in for replacement or removal   Has been feeling generally well   Had covid  vaccine        Preop Questions 6/21/2021   1. Have you ever had a heart attack or stroke? No   2. Have you ever had surgery on your heart or blood vessels, such as a stent placement, a coronary artery bypass, or surgery on an artery in your head, neck, heart, or legs? YES - vein stripping about 30 years ago    3. Do you have chest pain with activity? No   4. Do you have a history of  heart failure? No   5. Do you currently have a cold, bronchitis or symptoms of other infection? No   6. Do you have a cough, shortness of breath, or wheezing? No   7. Do you or anyone in your family have previous history of blood clots? YES - DVT 2/2020   8. Do you or does anyone in your family have a serious bleeding problem such as prolonged bleeding following surgeries or cuts? No   9. Have you ever had problems with anemia or been told to take iron pills? No   10. Have you had any abnormal blood loss such as black, tarry or bloody stools? YES - blood in stool in the past that resulted in workup   11. Have you ever had a blood transfusion? No   12. Are you willing to have a blood transfusion if it is medically needed before, during, or after your surgery? Yes   13. Have you or any of your relatives ever had problems with anesthesia? No   14. Do you have sleep apnea, excessive snoring or daytime drowsiness? No   15. Do you have any artifical heart valves or other implanted medical devices like a pacemaker, defibrillator, or continuous glucose monitor? No   16. Do you have artificial joints? No   17. Are you allergic to latex? No       Health Care Directive:  Patient has a Health Care Directive on file      Preoperative Review of :   reviewed - no record of controlled substances prescribed.      Status of Chronic Conditions:  See problem list for active medical problems.  Problems all longstanding and stable, except as noted/documented.  See ROS for pertinent symptoms related to these conditions.      Review of  Systems  Constitutional, neuro, ENT, endocrine, pulmonary, cardiac, gastrointestinal, genitourinary, musculoskeletal, integument and psychiatric systems are negative, except as otherwise noted.    Patient Active Problem List    Diagnosis Date Noted     Elevated serum creatinine 06/13/2021     Priority: Medium     Right ureteral stone 06/13/2021     Priority: Medium     Decreased stooling 06/13/2021     Priority: Medium     Acute renal failure with tubular necrosis (H) 06/2021     Priority: Medium     due to renal stone and hydro       Bilateral leg edema 11/30/2020     Priority: Medium     DVT, lower extremity, distal, acute, right (H)      Priority: Medium     Adenomatous colon polyp 01/01/2019     Priority: Medium     Benign essential hypertension      Priority: Medium     us done and no hydro but renal cyst       Eczema      Priority: Medium     HYPERLIPIDEMIA LDL GOAL <160 10/31/2010     Priority: Medium     MALIG NEOPLASM  /SKIN NECK-rx irradiation 01/28/2008     Priority: Medium     IMO update changed this record. 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, hosp fsd     DVT, lower extremity, distal, acute, right (H) 02/2020, 1996    gastrocnemius vein in 2020, not sure which vein in 1996, factor 5 leiden neg 2020     Eczema      Gallstone 2010    seen on ct for renal stone     Generalized osteoarthrosis, unspecified site      HTN (hypertension) 06/2014    us done and no hydro but renal cyst, had edema with norvasc 5mg, added hctz and stopped norvasc 11/20     Hx of colonoscopy 2011    nl      Hydronephrosis of left kidney 2010    seen on above ct     Nephrolithiasis 2010    seen on ct, hydronephrosis seen as well; had richy and hydro 6/21, stenting done.     Prostate cancer (H) 2008    had surgery, D.r Michelle     Skin cancer, basal cell 2002, 2008    had xrt 2002, mohs surgery 2008     Thrombophlebitis 1985     Past Surgical History:   Procedure Laterality Date     CIRCUMCISION  3/12/2013    Procedure: CIRCUMCISION;  CIRCUMCISION;  Surgeon: Stevenson Martinez MD;  Location: [facility]     COLONOSCOPY  2011; 2019    Dr. Wills; [facility]     HERNIA REPAIR  1961    Hernia     LASER HOLMIUM LITHOTRIPSY URETER(S), INSERT STENT, COMBINED Right 6/13/2021    Procedure: Cystoscopy, right ureter stent placement;  Surgeon: Rishi Vincent MD;  Location:  OR     mohs surgery  2008, 2011    basal cell     TURP  2008    prostate cancer     VASCULAR SURGERY  1985    vein stripping both legs some     [facility] NONSPECIFIC PROCEDURE  13, 35    HERNIORRHAPHY     [facility] NONSPECIFIC PROCEDURE  1985    VEIN STRIPPING     Current Outpatient Medications   Medication Sig Dispense Refill     ascorbic acid 1000 MG TABS tablet Take 1,000 mg by mouth 2 times daily       Cholecalciferol (VITAMIN D PO) Take 1,000 Units by mouth daily        ciprofloxacin (CIPRO) 250 MG tablet Take 1 tablet (250 mg) by mouth every 24 hours for 9 days 9 tablet 0     dorzolamide-timolol PF (COSOPT) 22.3-6.8 MG/ML opthalmic solution Place 1 drop into both eyes 2 times daily       ipratropium (ATROVENT) 0.06 % nasal spray Spray 2 sprays into both nostrils 2 times daily 15 mL 1     latanoprost (XALATAN) 0.005 % ophthalmic solution Place 1 drop into both eyes daily        MULTIPLE VITAMIN PO Take 1 tablet by mouth every morning       rivaroxaban ANTICOAGULANT (XARELTO ANTICOAGULANT) 20 MG TABS tablet Take 1 tablet (20 mg) by mouth daily (with dinner) 90 tablet 3     tamsulosin (FLOMAX) 0.4 MG capsule Take 1 capsule (0.4 mg) by mouth daily 30 capsule 0      "triamcinolone (KENALOG) 0.1 % external cream Apply topically daily as needed for irritation (Back)        vitamin B complex with vitamin C (VITAMIN  B COMPLEX) tablet Take 1 tablet by mouth daily       zinc gluconate 50 MG tablet Take 50 mg by mouth daily         Allergies   Allergen Reactions     Cat Hair [Cats]      Pneumococcal Vaccine Rash        Social History     Tobacco Use     Smoking status: Never Smoker     Smokeless tobacco: Never Used   Substance Use Topics     Alcohol use: Yes     Alcohol/week: 0.0 standard drinks     Comment: occasional     Family History   Problem Relation Age of Onset     Hypertension Mother         dec.     Arthritis Mother      Gastrointestinal Disease Mother      Deep Vein Thrombosis Mother      Cancer Father         Lung     Other Cancer Father         Lung     Asthma Father      C.A.D. Paternal Grandmother         ?     Heart Disease Maternal Grandmother         ?     Hypertension Maternal Grandmother         dec.     History   Drug Use No         Objective     /86 (BP Location: Left arm, Cuff Size: Adult Large)   Pulse 58   Temp 97.9  F (36.6  C) (Tympanic)   Ht 1.753 m (5' 9\")   Wt 78.3 kg (172 lb 9.6 oz)   SpO2 100%   BMI 25.49 kg/m      Physical Exam    GENERAL APPEARANCE: healthy, alert and no distress     EYES: EOMI     RESP: lungs clear to auscultation - no rales, rhonchi or wheezes     CV: regular rates and rhythm, normal S1 S2, no S3 or S4 and no murmur, click or rub     MS: extremities normal- no gross deformities noted, no evidence of inflammation in joints, FROM in all extremities.     SKIN: no suspicious lesions or rashes     NEURO: Normal strength and tone, sensory exam grossly normal, mentation intact and speech normal     PSYCH: mentation appears normal. and affect normal/bright    Recent Labs   Lab Test 06/14/21  0653 06/12/21  2227 02/25/20  1517 02/25/20  1517 02/22/20  2033   HGB 10.8* 14.3   < > 13.5 13.0*    234   < > 281 241   INR  --   " --   --  1.37* 1.00    136   < >  --  140   POTASSIUM 4.4 3.9   < >  --  4.0   CR 2.07* 2.85*   < >  --  1.01    < > = values in this interval not displayed.        Diagnostics:  Recent Results (from the past 168 hour(s))   Basic Metabolic Panel    Collection Time: 06/21/21  1:40 PM   Result Value Ref Range    Sodium 139 133 - 144 mmol/L    Potassium 5.4 (H) 3.4 - 5.3 mmol/L    Chloride 110 (H) 94 - 109 mmol/L    Carbon Dioxide 25 20 - 32 mmol/L    Anion Gap 4 3 - 14 mmol/L    Glucose 92 70 - 99 mg/dL    Urea Nitrogen 31 (H) 7 - 30 mg/dL    Creatinine 1.34 (H) 0.66 - 1.25 mg/dL    GFR Estimate 48 (L) >60 mL/min/[1.73_m2]    GFR Estimate If Black 55 (L) >60 mL/min/[1.73_m2]    Calcium 9.2 8.5 - 10.1 mg/dL   CBC w/ Reflex to Iron Studies    Collection Time: 06/21/21  1:40 PM   Result Value Ref Range    WBC 8.1 4.0 - 11.0 10e9/L    RBC Count 4.27 (L) 4.4 - 5.9 10e12/L    Hemoglobin 14.1 13.3 - 17.7 g/dL    Hematocrit 41.9 40.0 - 53.0 %    MCV 98 78 - 100 fl    MCH 33.0 26.5 - 33.0 pg    MCHC 33.7 31.5 - 36.5 g/dL    RDW 12.6 10.0 - 15.0 %    Platelet Count 378 150 - 450 10e9/L    Iron Study JIC Tube Done       No EKG required, no history of coronary heart disease, significant arrhythmia, peripheral arterial disease or other structural heart disease.    Revised Cardiac Risk Index (RCRI):  The patient has the following serious cardiovascular risks for perioperative complications:   - Serum Creatinine >2.0 mg/dl = 1 point     RCRI Interpretation: 1 point: Class II (low risk - 0.9% complication rate)           Signed Electronically by: SMITHA Amezcua CNP  Copy of this evaluation report is provided to requesting physician.

## 2021-06-21 NOTE — H&P (VIEW-ONLY)
[address], [address]  [facility] [address]  Phone: [phone]  Primary Provider: Hosea Lockett  Pre-op Performing Provider: SUDHA SANDOVAL      PREOPERATIVE EVALUATION:  Today's date: 6/21/2021    Jose Ray is a 86 year old male who presents for a preoperative evaluation.    Surgical Information:  Surgery/Procedure: CYSTOSCOPY, RIGHT URETEROSCOPY, HOLMIUM LASER LITHOTRIPSY, AND POSSIBLE URETERAL STENT EXCHANGE VERSUS REMOVAL  Surgery Location:  OR  Surgeon: Stevenson Martinez MD  Surgery Date: 7-2-2021  Time of Surgery: tbd  Where patient plans to recover: At home with family  Fax number for surgical facility: Note does not need to be faxed, will be available electronically in Epic.    Type of Anesthesia Anticipated: General    Assessment & Plan     The proposed surgical procedure is considered INTERMEDIATE risk.    Problem List Items Addressed This Visit     Right ureteral stone      Other Visit Diagnoses     Preop general physical exam    -  Primary    Relevant Orders    Basic Metabolic Panel (Completed)    CBC w/ Reflex to Iron Studies (Completed)               Risks and Recommendations:  The patient has the following additional risks and recommendations for perioperative complications:   - No identified additional risk factors other than previously addressed    Medication Instructions:  Patient is to take all scheduled medications on the day of surgery EXCEPT for modifications listed below:   - apixaban (Eliquis), edoxaban (Savaysa), rivaroxaban (Xarelto): CrCl <50 mL/min. HOLD 48 hours before procedure  Hold Vitamins morning of surgery       RECOMMENDATION:  APPROVAL GIVEN to proceed with proposed procedure, without further diagnostic evaluation.        Subjective     HPI related to upcoming procedure: recent obstructive stone with stent placement on 6/13. Now is going in for replacement or removal   Has been feeling generally well   Had covid  vaccine        Preop Questions 6/21/2021   1. Have you ever had a heart attack or stroke? No   2. Have you ever had surgery on your heart or blood vessels, such as a stent placement, a coronary artery bypass, or surgery on an artery in your head, neck, heart, or legs? YES - vein stripping about 30 years ago    3. Do you have chest pain with activity? No   4. Do you have a history of  heart failure? No   5. Do you currently have a cold, bronchitis or symptoms of other infection? No   6. Do you have a cough, shortness of breath, or wheezing? No   7. Do you or anyone in your family have previous history of blood clots? YES - DVT 2/2020   8. Do you or does anyone in your family have a serious bleeding problem such as prolonged bleeding following surgeries or cuts? No   9. Have you ever had problems with anemia or been told to take iron pills? No   10. Have you had any abnormal blood loss such as black, tarry or bloody stools? YES - blood in stool in the past that resulted in workup   11. Have you ever had a blood transfusion? No   12. Are you willing to have a blood transfusion if it is medically needed before, during, or after your surgery? Yes   13. Have you or any of your relatives ever had problems with anesthesia? No   14. Do you have sleep apnea, excessive snoring or daytime drowsiness? No   15. Do you have any artifical heart valves or other implanted medical devices like a pacemaker, defibrillator, or continuous glucose monitor? No   16. Do you have artificial joints? No   17. Are you allergic to latex? No       Health Care Directive:  Patient has a Health Care Directive on file      Preoperative Review of :   reviewed - no record of controlled substances prescribed.      Status of Chronic Conditions:  See problem list for active medical problems.  Problems all longstanding and stable, except as noted/documented.  See ROS for pertinent symptoms related to these conditions.      Review of  Systems  Constitutional, neuro, ENT, endocrine, pulmonary, cardiac, gastrointestinal, genitourinary, musculoskeletal, integument and psychiatric systems are negative, except as otherwise noted.    Patient Active Problem List    Diagnosis Date Noted     Elevated serum creatinine 06/13/2021     Priority: Medium     Right ureteral stone 06/13/2021     Priority: Medium     Decreased stooling 06/13/2021     Priority: Medium     Acute renal failure with tubular necrosis (H) 06/2021     Priority: Medium     due to renal stone and hydro       Bilateral leg edema 11/30/2020     Priority: Medium     DVT, lower extremity, distal, acute, right (H)      Priority: Medium     Adenomatous colon polyp 01/01/2019     Priority: Medium     Benign essential hypertension      Priority: Medium     us done and no hydro but renal cyst       Eczema      Priority: Medium     HYPERLIPIDEMIA LDL GOAL <160 10/31/2010     Priority: Medium     MALIG NEOPLASM  /SKIN NECK-rx irradiation 01/28/2008     Priority: Medium     IMO update changed this record. 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, hosp fsd     DVT, lower extremity, distal, acute, right (H) 02/2020, 1996    gastrocnemius vein in 2020, not sure which vein in 1996, factor 5 leiden neg 2020     Eczema      Gallstone 2010    seen on ct for renal stone     Generalized osteoarthrosis, unspecified site      HTN (hypertension) 06/2014    us done and no hydro but renal cyst, had edema with norvasc 5mg, added hctz and stopped norvasc 11/20     Hx of colonoscopy 2011    nl      Hydronephrosis of left kidney 2010    seen on above ct     Nephrolithiasis 2010    seen on ct, hydronephrosis seen as well; had richy and hydro 6/21, stenting done.     Prostate cancer (H) 2008    had surgery, D.r Michelle     Skin cancer, basal cell 2002, 2008    had xrt 2002, mohs surgery 2008     Thrombophlebitis 1985     Past Surgical History:   Procedure Laterality Date     CIRCUMCISION  3/12/2013    Procedure: CIRCUMCISION;  CIRCUMCISION;  Surgeon: Stevenson Martinez MD;  Location: [facility]     COLONOSCOPY  2011; 2019    Dr. Wills; [facility]     HERNIA REPAIR  1961    Hernia     LASER HOLMIUM LITHOTRIPSY URETER(S), INSERT STENT, COMBINED Right 6/13/2021    Procedure: Cystoscopy, right ureter stent placement;  Surgeon: Rishi Vincent MD;  Location:  OR     mohs surgery  2008, 2011    basal cell     TURP  2008    prostate cancer     VASCULAR SURGERY  1985    vein stripping both legs some     [facility] NONSPECIFIC PROCEDURE  13, 35    HERNIORRHAPHY     [facility] NONSPECIFIC PROCEDURE  1985    VEIN STRIPPING     Current Outpatient Medications   Medication Sig Dispense Refill     ascorbic acid 1000 MG TABS tablet Take 1,000 mg by mouth 2 times daily       Cholecalciferol (VITAMIN D PO) Take 1,000 Units by mouth daily        ciprofloxacin (CIPRO) 250 MG tablet Take 1 tablet (250 mg) by mouth every 24 hours for 9 days 9 tablet 0     dorzolamide-timolol PF (COSOPT) 22.3-6.8 MG/ML opthalmic solution Place 1 drop into both eyes 2 times daily       ipratropium (ATROVENT) 0.06 % nasal spray Spray 2 sprays into both nostrils 2 times daily 15 mL 1     latanoprost (XALATAN) 0.005 % ophthalmic solution Place 1 drop into both eyes daily        MULTIPLE VITAMIN PO Take 1 tablet by mouth every morning       rivaroxaban ANTICOAGULANT (XARELTO ANTICOAGULANT) 20 MG TABS tablet Take 1 tablet (20 mg) by mouth daily (with dinner) 90 tablet 3     tamsulosin (FLOMAX) 0.4 MG capsule Take 1 capsule (0.4 mg) by mouth daily 30 capsule 0      "triamcinolone (KENALOG) 0.1 % external cream Apply topically daily as needed for irritation (Back)        vitamin B complex with vitamin C (VITAMIN  B COMPLEX) tablet Take 1 tablet by mouth daily       zinc gluconate 50 MG tablet Take 50 mg by mouth daily         Allergies   Allergen Reactions     Cat Hair [Cats]      Pneumococcal Vaccine Rash        Social History     Tobacco Use     Smoking status: Never Smoker     Smokeless tobacco: Never Used   Substance Use Topics     Alcohol use: Yes     Alcohol/week: 0.0 standard drinks     Comment: occasional     Family History   Problem Relation Age of Onset     Hypertension Mother         dec.     Arthritis Mother      Gastrointestinal Disease Mother      Deep Vein Thrombosis Mother      Cancer Father         Lung     Other Cancer Father         Lung     Asthma Father      C.A.D. Paternal Grandmother         ?     Heart Disease Maternal Grandmother         ?     Hypertension Maternal Grandmother         dec.     History   Drug Use No         Objective     /86 (BP Location: Left arm, Cuff Size: Adult Large)   Pulse 58   Temp 97.9  F (36.6  C) (Tympanic)   Ht 1.753 m (5' 9\")   Wt 78.3 kg (172 lb 9.6 oz)   SpO2 100%   BMI 25.49 kg/m      Physical Exam    GENERAL APPEARANCE: healthy, alert and no distress     EYES: EOMI     RESP: lungs clear to auscultation - no rales, rhonchi or wheezes     CV: regular rates and rhythm, normal S1 S2, no S3 or S4 and no murmur, click or rub     MS: extremities normal- no gross deformities noted, no evidence of inflammation in joints, FROM in all extremities.     SKIN: no suspicious lesions or rashes     NEURO: Normal strength and tone, sensory exam grossly normal, mentation intact and speech normal     PSYCH: mentation appears normal. and affect normal/bright    Recent Labs   Lab Test 06/14/21  0653 06/12/21  2227 02/25/20  1517 02/25/20  1517 02/22/20  2033   HGB 10.8* 14.3   < > 13.5 13.0*    234   < > 281 241   INR  --   " --   --  1.37* 1.00    136   < >  --  140   POTASSIUM 4.4 3.9   < >  --  4.0   CR 2.07* 2.85*   < >  --  1.01    < > = values in this interval not displayed.        Diagnostics:  Recent Results (from the past 168 hour(s))   Basic Metabolic Panel    Collection Time: 06/21/21  1:40 PM   Result Value Ref Range    Sodium 139 133 - 144 mmol/L    Potassium 5.4 (H) 3.4 - 5.3 mmol/L    Chloride 110 (H) 94 - 109 mmol/L    Carbon Dioxide 25 20 - 32 mmol/L    Anion Gap 4 3 - 14 mmol/L    Glucose 92 70 - 99 mg/dL    Urea Nitrogen 31 (H) 7 - 30 mg/dL    Creatinine 1.34 (H) 0.66 - 1.25 mg/dL    GFR Estimate 48 (L) >60 mL/min/[1.73_m2]    GFR Estimate If Black 55 (L) >60 mL/min/[1.73_m2]    Calcium 9.2 8.5 - 10.1 mg/dL   CBC w/ Reflex to Iron Studies    Collection Time: 06/21/21  1:40 PM   Result Value Ref Range    WBC 8.1 4.0 - 11.0 10e9/L    RBC Count 4.27 (L) 4.4 - 5.9 10e12/L    Hemoglobin 14.1 13.3 - 17.7 g/dL    Hematocrit 41.9 40.0 - 53.0 %    MCV 98 78 - 100 fl    MCH 33.0 26.5 - 33.0 pg    MCHC 33.7 31.5 - 36.5 g/dL    RDW 12.6 10.0 - 15.0 %    Platelet Count 378 150 - 450 10e9/L    Iron Study JIC Tube Done       No EKG required, no history of coronary heart disease, significant arrhythmia, peripheral arterial disease or other structural heart disease.    Revised Cardiac Risk Index (RCRI):  The patient has the following serious cardiovascular risks for perioperative complications:   - Serum Creatinine >2.0 mg/dl = 1 point     RCRI Interpretation: 1 point: Class II (low risk - 0.9% complication rate)           Signed Electronically by: SMITHA Amezcua CNP  Copy of this evaluation report is provided to requesting physician.

## 2021-06-22 ENCOUNTER — TELEPHONE (OUTPATIENT)
Dept: NURSING | Facility: CLINIC | Age: 86
End: 2021-06-22

## 2021-06-22 LAB
ANION GAP SERPL CALCULATED.3IONS-SCNC: 4 MMOL/L (ref 3–14)
BUN SERPL-MCNC: 31 MG/DL (ref 7–30)
CALCIUM SERPL-MCNC: 9.2 MG/DL (ref 8.5–10.1)
CHLORIDE SERPL-SCNC: 110 MMOL/L (ref 94–109)
CO2 SERPL-SCNC: 25 MMOL/L (ref 20–32)
CREAT SERPL-MCNC: 1.34 MG/DL (ref 0.66–1.25)
GFR SERPL CREATININE-BSD FRML MDRD: 48 ML/MIN/{1.73_M2}
GLUCOSE SERPL-MCNC: 92 MG/DL (ref 70–99)
POTASSIUM SERPL-SCNC: 5.4 MMOL/L (ref 3.4–5.3)
SODIUM SERPL-SCNC: 139 MMOL/L (ref 133–144)

## 2021-06-22 NOTE — TELEPHONE ENCOUNTER
Patient calling regarding the time for his surgery on 7/2. Advised surgery is scheduled for noon, no further questions at this time.     Nikole Win RN 06/22/21 3:38 PM    Health Triage Nurse Advisor

## 2021-06-24 NOTE — RESULT ENCOUNTER NOTE
Salvador, hope you were able to see that your kidney function has improved nicely which is great. Your potassium is just above normal. This can be rechecked at your next appt or the morning of your procedure. Nothing we need to do about it at this point. You can try to avoid some high potassium foods at this point. There's a long list online but a few are bananas, cantaloupe, oranges, potatoes, cooked broccoli and spinach.  Let me know if you have questions  Baltazar

## 2021-06-29 DIAGNOSIS — Z11.59 ENCOUNTER FOR SCREENING FOR OTHER VIRAL DISEASES: ICD-10-CM

## 2021-06-29 LAB
LABORATORY COMMENT REPORT: NORMAL
SARS-COV-2 RNA RESP QL NAA+PROBE: NEGATIVE
SARS-COV-2 RNA RESP QL NAA+PROBE: NORMAL
SPECIMEN SOURCE: NORMAL
SPECIMEN SOURCE: NORMAL

## 2021-06-29 PROCEDURE — U0003 INFECTIOUS AGENT DETECTION BY NUCLEIC ACID (DNA OR RNA); SEVERE ACUTE RESPIRATORY SYNDROME CORONAVIRUS 2 (SARS-COV-2) (CORONAVIRUS DISEASE [COVID-19]), AMPLIFIED PROBE TECHNIQUE, MAKING USE OF HIGH THROUGHPUT TECHNOLOGIES AS DESCRIBED BY CMS-2020-01-R: HCPCS | Performed by: UROLOGY

## 2021-06-29 PROCEDURE — U0005 INFEC AGEN DETEC AMPLI PROBE: HCPCS | Performed by: UROLOGY

## 2021-07-02 ENCOUNTER — ANESTHESIA EVENT (OUTPATIENT)
Dept: SURGERY | Facility: CLINIC | Age: 86
End: 2021-07-02
Payer: COMMERCIAL

## 2021-07-02 ENCOUNTER — HOSPITAL ENCOUNTER (OUTPATIENT)
Facility: CLINIC | Age: 86
Discharge: HOME | End: 2021-07-02
Attending: UROLOGY | Admitting: UROLOGY
Payer: COMMERCIAL

## 2021-07-02 ENCOUNTER — APPOINTMENT (OUTPATIENT)
Dept: GENERAL RADIOLOGY | Facility: CLINIC | Age: 86
End: 2021-07-02
Attending: UROLOGY
Payer: COMMERCIAL

## 2021-07-02 ENCOUNTER — ANESTHESIA (OUTPATIENT)
Dept: SURGERY | Facility: CLINIC | Age: 86
End: 2021-07-02
Payer: COMMERCIAL

## 2021-07-02 VITALS
SYSTOLIC BLOOD PRESSURE: 155 MMHG | BODY MASS INDEX: 25.31 KG/M2 | DIASTOLIC BLOOD PRESSURE: 86 MMHG | HEIGHT: 69 IN | HEART RATE: 70 BPM | OXYGEN SATURATION: 97 % | TEMPERATURE: 96.2 F | WEIGHT: 170.9 LBS | RESPIRATION RATE: 18 BRPM

## 2021-07-02 DIAGNOSIS — N20.1 RIGHT URETERAL STONE: Primary | ICD-10-CM

## 2021-07-02 LAB
ANION GAP SERPL CALCULATED.3IONS-SCNC: 5 MMOL/L (ref 3–14)
BUN SERPL-MCNC: 29 MG/DL (ref 7–30)
CALCIUM SERPL-MCNC: 8.6 MG/DL (ref 8.5–10.1)
CHLORIDE SERPL-SCNC: 110 MMOL/L (ref 94–109)
CO2 SERPL-SCNC: 25 MMOL/L (ref 20–32)
COPATH REPORT: NORMAL
CREAT SERPL-MCNC: 1.35 MG/DL (ref 0.66–1.25)
GFR SERPL CREATININE-BSD FRML MDRD: 47 ML/MIN/{1.73_M2}
GLUCOSE SERPL-MCNC: 102 MG/DL (ref 70–99)
POTASSIUM SERPL-SCNC: 4 MMOL/L (ref 3.4–5.3)
SODIUM SERPL-SCNC: 140 MMOL/L (ref 133–144)

## 2021-07-02 PROCEDURE — 80048 BASIC METABOLIC PNL TOTAL CA: CPT | Performed by: ANESTHESIOLOGY

## 2021-07-02 PROCEDURE — 88300 SURGICAL PATH GROSS: CPT | Mod: TC | Performed by: UROLOGY

## 2021-07-02 PROCEDURE — C1781 MESH (IMPLANTABLE): HCPCS | Performed by: UROLOGY

## 2021-07-02 PROCEDURE — 999N000179 XR SURGERY CARM FLUORO LESS THAN 5 MIN W STILLS

## 2021-07-02 PROCEDURE — 710N000009 HC RECOVERY PHASE 1, LEVEL 1, PER MIN: Performed by: UROLOGY

## 2021-07-02 PROCEDURE — 999N000054 HC STATISTIC EKG NON-CHARGEABLE

## 2021-07-02 PROCEDURE — 258N000001 HC RX 258: Performed by: UROLOGY

## 2021-07-02 PROCEDURE — 250N000025 HC SEVOFLURANE, PER MIN: Performed by: UROLOGY

## 2021-07-02 PROCEDURE — 272N000001 HC OR GENERAL SUPPLY STERILE: Performed by: UROLOGY

## 2021-07-02 PROCEDURE — 250N000011 HC RX IP 250 OP 636: Performed by: PHYSICIAN ASSISTANT

## 2021-07-02 PROCEDURE — 36415 COLL VENOUS BLD VENIPUNCTURE: CPT | Performed by: ANESTHESIOLOGY

## 2021-07-02 PROCEDURE — 710N000012 HC RECOVERY PHASE 2, PER MINUTE: Performed by: UROLOGY

## 2021-07-02 PROCEDURE — 999N000141 HC STATISTIC PRE-PROCEDURE NURSING ASSESSMENT: Performed by: UROLOGY

## 2021-07-02 PROCEDURE — 88300 SURGICAL PATH GROSS: CPT | Mod: 26 | Performed by: PATHOLOGY

## 2021-07-02 PROCEDURE — 250N000011 HC RX IP 250 OP 636: Performed by: NURSE ANESTHETIST, CERTIFIED REGISTERED

## 2021-07-02 PROCEDURE — C1769 GUIDE WIRE: HCPCS | Performed by: UROLOGY

## 2021-07-02 PROCEDURE — 82365 CALCULUS SPECTROSCOPY: CPT | Performed by: UROLOGY

## 2021-07-02 PROCEDURE — 360N000076 HC SURGERY LEVEL 3, PER MIN: Performed by: UROLOGY

## 2021-07-02 PROCEDURE — 93005 ELECTROCARDIOGRAM TRACING: CPT

## 2021-07-02 PROCEDURE — C1758 CATHETER, URETERAL: HCPCS | Performed by: UROLOGY

## 2021-07-02 PROCEDURE — 258N000003 HC RX IP 258 OP 636: Performed by: NURSE ANESTHETIST, CERTIFIED REGISTERED

## 2021-07-02 PROCEDURE — 250N000009 HC RX 250: Performed by: NURSE ANESTHETIST, CERTIFIED REGISTERED

## 2021-07-02 PROCEDURE — 370N000017 HC ANESTHESIA TECHNICAL FEE, PER MIN: Performed by: UROLOGY

## 2021-07-02 PROCEDURE — 250N000009 HC RX 250: Performed by: UROLOGY

## 2021-07-02 DEVICE — URETERAL STENT
Type: IMPLANTABLE DEVICE | Site: URETER | Status: FUNCTIONAL
Brand: POLARIS™ ULTRA

## 2021-07-02 RX ORDER — ACETAMINOPHEN 325 MG/1
650 TABLET ORAL ONCE
Status: DISCONTINUED | OUTPATIENT
Start: 2021-07-02 | End: 2021-07-02 | Stop reason: HOSPADM

## 2021-07-02 RX ORDER — HYDROMORPHONE HYDROCHLORIDE 1 MG/ML
.3-.5 INJECTION, SOLUTION INTRAMUSCULAR; INTRAVENOUS; SUBCUTANEOUS EVERY 5 MIN PRN
Status: DISCONTINUED | OUTPATIENT
Start: 2021-07-02 | End: 2021-07-02 | Stop reason: HOSPADM

## 2021-07-02 RX ORDER — ONDANSETRON 4 MG/1
4 TABLET, ORALLY DISINTEGRATING ORAL EVERY 30 MIN PRN
Status: DISCONTINUED | OUTPATIENT
Start: 2021-07-02 | End: 2021-07-02 | Stop reason: HOSPADM

## 2021-07-02 RX ORDER — NALOXONE HYDROCHLORIDE 0.4 MG/ML
0.4 INJECTION, SOLUTION INTRAMUSCULAR; INTRAVENOUS; SUBCUTANEOUS
Status: DISCONTINUED | OUTPATIENT
Start: 2021-07-02 | End: 2021-07-02 | Stop reason: HOSPADM

## 2021-07-02 RX ORDER — FENTANYL CITRATE 50 UG/ML
25-50 INJECTION, SOLUTION INTRAMUSCULAR; INTRAVENOUS
Status: DISCONTINUED | OUTPATIENT
Start: 2021-07-02 | End: 2021-07-02 | Stop reason: HOSPADM

## 2021-07-02 RX ORDER — SULFAMETHOXAZOLE/TRIMETHOPRIM 800-160 MG
1 TABLET ORAL 2 TIMES DAILY
Qty: 4 TABLET | Refills: 0 | Status: SHIPPED | OUTPATIENT
Start: 2021-07-02 | End: 2021-07-04

## 2021-07-02 RX ORDER — PROPOFOL 10 MG/ML
INJECTION, EMULSION INTRAVENOUS PRN
Status: DISCONTINUED | OUTPATIENT
Start: 2021-07-02 | End: 2021-07-02

## 2021-07-02 RX ORDER — NALOXONE HYDROCHLORIDE 0.4 MG/ML
0.2 INJECTION, SOLUTION INTRAMUSCULAR; INTRAVENOUS; SUBCUTANEOUS
Status: DISCONTINUED | OUTPATIENT
Start: 2021-07-02 | End: 2021-07-02 | Stop reason: HOSPADM

## 2021-07-02 RX ORDER — CEFAZOLIN SODIUM 2 G/100ML
2 INJECTION, SOLUTION INTRAVENOUS SEE ADMIN INSTRUCTIONS
Status: DISCONTINUED | OUTPATIENT
Start: 2021-07-02 | End: 2021-07-02 | Stop reason: HOSPADM

## 2021-07-02 RX ORDER — SODIUM CHLORIDE, SODIUM LACTATE, POTASSIUM CHLORIDE, CALCIUM CHLORIDE 600; 310; 30; 20 MG/100ML; MG/100ML; MG/100ML; MG/100ML
INJECTION, SOLUTION INTRAVENOUS CONTINUOUS PRN
Status: DISCONTINUED | OUTPATIENT
Start: 2021-07-02 | End: 2021-07-02

## 2021-07-02 RX ORDER — FUROSEMIDE 10 MG/ML
INJECTION INTRAMUSCULAR; INTRAVENOUS PRN
Status: DISCONTINUED | OUTPATIENT
Start: 2021-07-02 | End: 2021-07-02

## 2021-07-02 RX ORDER — CEFAZOLIN SODIUM 2 G/100ML
2 INJECTION, SOLUTION INTRAVENOUS
Status: DISCONTINUED | OUTPATIENT
Start: 2021-07-02 | End: 2021-07-02 | Stop reason: HOSPADM

## 2021-07-02 RX ORDER — DEXAMETHASONE SODIUM PHOSPHATE 4 MG/ML
INJECTION, SOLUTION INTRA-ARTICULAR; INTRALESIONAL; INTRAMUSCULAR; INTRAVENOUS; SOFT TISSUE PRN
Status: DISCONTINUED | OUTPATIENT
Start: 2021-07-02 | End: 2021-07-02

## 2021-07-02 RX ORDER — EPHEDRINE SULFATE 50 MG/ML
INJECTION, SOLUTION INTRAMUSCULAR; INTRAVENOUS; SUBCUTANEOUS PRN
Status: DISCONTINUED | OUTPATIENT
Start: 2021-07-02 | End: 2021-07-02

## 2021-07-02 RX ORDER — ATROPA BELLADONNA AND OPIUM 16.2; 3 MG/1; MG/1
SUPPOSITORY RECTAL PRN
Status: DISCONTINUED | OUTPATIENT
Start: 2021-07-02 | End: 2021-07-02 | Stop reason: HOSPADM

## 2021-07-02 RX ORDER — ONDANSETRON 2 MG/ML
4 INJECTION INTRAMUSCULAR; INTRAVENOUS EVERY 30 MIN PRN
Status: DISCONTINUED | OUTPATIENT
Start: 2021-07-02 | End: 2021-07-02 | Stop reason: HOSPADM

## 2021-07-02 RX ORDER — LIDOCAINE HYDROCHLORIDE 20 MG/ML
INJECTION, SOLUTION INFILTRATION; PERINEURAL PRN
Status: DISCONTINUED | OUTPATIENT
Start: 2021-07-02 | End: 2021-07-02

## 2021-07-02 RX ORDER — ONDANSETRON 2 MG/ML
INJECTION INTRAMUSCULAR; INTRAVENOUS PRN
Status: DISCONTINUED | OUTPATIENT
Start: 2021-07-02 | End: 2021-07-02

## 2021-07-02 RX ORDER — HYDROCODONE BITARTRATE AND ACETAMINOPHEN 5; 325 MG/1; MG/1
1 TABLET ORAL ONCE
Status: DISCONTINUED | OUTPATIENT
Start: 2021-07-02 | End: 2021-07-02 | Stop reason: HOSPADM

## 2021-07-02 RX ORDER — SODIUM CHLORIDE, SODIUM LACTATE, POTASSIUM CHLORIDE, CALCIUM CHLORIDE 600; 310; 30; 20 MG/100ML; MG/100ML; MG/100ML; MG/100ML
INJECTION, SOLUTION INTRAVENOUS CONTINUOUS
Status: DISCONTINUED | OUTPATIENT
Start: 2021-07-02 | End: 2021-07-02 | Stop reason: HOSPADM

## 2021-07-02 RX ORDER — FENTANYL CITRATE 50 UG/ML
INJECTION, SOLUTION INTRAMUSCULAR; INTRAVENOUS PRN
Status: DISCONTINUED | OUTPATIENT
Start: 2021-07-02 | End: 2021-07-02

## 2021-07-02 RX ADMIN — Medication 5 MG: at 12:51

## 2021-07-02 RX ADMIN — ONDANSETRON 4 MG: 2 INJECTION INTRAMUSCULAR; INTRAVENOUS at 13:17

## 2021-07-02 RX ADMIN — DEXAMETHASONE SODIUM PHOSPHATE 4 MG: 4 INJECTION, SOLUTION INTRA-ARTICULAR; INTRALESIONAL; INTRAMUSCULAR; INTRAVENOUS; SOFT TISSUE at 12:41

## 2021-07-02 RX ADMIN — SODIUM CHLORIDE, POTASSIUM CHLORIDE, SODIUM LACTATE AND CALCIUM CHLORIDE: 600; 310; 30; 20 INJECTION, SOLUTION INTRAVENOUS at 12:28

## 2021-07-02 RX ADMIN — LIDOCAINE HYDROCHLORIDE 100 MG: 20 INJECTION, SOLUTION INFILTRATION; PERINEURAL at 12:32

## 2021-07-02 RX ADMIN — FUROSEMIDE 10 MG: 10 INJECTION, SOLUTION INTRAVENOUS at 12:36

## 2021-07-02 RX ADMIN — CEFAZOLIN SODIUM 2 G: 2 INJECTION, SOLUTION INTRAVENOUS at 12:35

## 2021-07-02 RX ADMIN — PROPOFOL 200 MG: 10 INJECTION, EMULSION INTRAVENOUS at 12:32

## 2021-07-02 RX ADMIN — FENTANYL CITRATE 25 MCG: 50 INJECTION, SOLUTION INTRAMUSCULAR; INTRAVENOUS at 12:46

## 2021-07-02 ASSESSMENT — ENCOUNTER SYMPTOMS: SEIZURES: 0

## 2021-07-02 ASSESSMENT — LIFESTYLE VARIABLES: TOBACCO_USE: 0

## 2021-07-02 ASSESSMENT — MIFFLIN-ST. JEOR: SCORE: 1450.58

## 2021-07-02 ASSESSMENT — COPD QUESTIONNAIRES: COPD: 0

## 2021-07-02 NOTE — ANESTHESIA PREPROCEDURE EVALUATION
Anesthesia Pre-Procedure Evaluation    Patient: Jose Ray   MRN: [id number] : 1935        Preoperative Diagnosis: Kidney stone [N20.0]   Procedure : Procedure(s):  CYSTOSCOPY, RIGHT URETEROSCOPY, HOLMIUM LASER LITHOTRIPSY, AND POSSIBLE URETERAL STENT EXCHANGE VERSUS REMOVAL     Past Medical History:   Diagnosis Date     Acute renal failure with tubular necrosis (H) 2021    due to renal stone and hydro     Adenomatous colon polyp 2019     Complex renal cyst 2014    seen on us done for htn, fu 6 months, fu done 8/15 and stable; fu  simple cyst, a bit larger     Diverticular hemorrhage 2019    colonic, hosp fsd     DVT, lower extremity, distal, acute, right (H) 2020,     gastrocnemius vein in , not sure which vein in , factor 5 leiden neg      Eczema      Gallstone     seen on ct for renal stone     Generalized osteoarthrosis, unspecified site      HTN (hypertension) 2014    us done and no hydro but renal cyst, had edema with norvasc 5mg, added hctz and stopped norvasc      Hx of colonoscopy     nl     Hydronephrosis of left kidney     seen on above ct     Nephrolithiasis     seen on ct, hydronephrosis seen as well; had richy and hydro , stenting done.     Prostate cancer (H)     had surgery, Zohra Martinez     Skin cancer, basal cell ,     had xrt , mohs surgery      Thrombophlebitis       Past Surgical History:   Procedure Laterality Date     CIRCUMCISION  3/12/2013    Procedure: CIRCUMCISION;  CIRCUMCISION;  Surgeon: Stevenson Martinez MD;  Location: [facility]     COLONOSCOPY  ;     [facility]     GENITOURINARY SURGERY      prostatectomy     HERNIA REPAIR      Hernia     LASER HOLMIUM LITHOTRIPSY URETER(S), INSERT STENT, COMBINED Right 2021    Procedure: Cystoscopy, right ureter stent placement;  Surgeon: Rishi Vincent MD;  Location:  OR     mohs surgery  ,     basal cell      VASCULAR SURGERY  1985    vein stripping both legs some     [facility] NONSPECIFIC PROCEDURE  13, 36    HERNIORRHAPHY     [facility] NONSPECIFIC PROCEDURE  1985    VEIN STRIPPING      Allergies   Allergen Reactions     Cat Hair [Cats]      Pneumococcal Vaccine Rash      Social History     Tobacco Use     Smoking status: Never Smoker     Smokeless tobacco: Never Used   Substance Use Topics     Alcohol use: Yes     Alcohol/week: 0.0 standard drinks     Comment: occasional      Wt Readings from Last 1 Encounters:   07/02/21 77.5 kg (170 lb 14.4 oz)        Anesthesia Evaluation   Pt has had prior anesthetic.     No history of anesthetic complications       ROS/MED HX  ENT/Pulmonary:    (-) tobacco use, asthma and COPD   Neurologic:    (-) no seizures and no CVA   Cardiovascular:     (+) Dyslipidemia hypertension-----    METS/Exercise Tolerance: 4 - Raking leaves, gardening    Hematologic:     (+) History of blood clots, pt is not anticoagulated,     Musculoskeletal:       GI/Hepatic:    (-) GERD   Renal/Genitourinary:     (+) renal disease, type: ARF, Pt does not require dialysis, Nephrolithiasis ,     Endo:    (-) Type I DM, Type II DM and obesity   Psychiatric/Substance Use:    (-) psychiatric history   Infectious Disease:       Malignancy:   (+) Malignancy, History of Prostate and Skin.Prostate CA Remission status post Surgery.  Skin CA Remission status post Surgery.        Other:            Physical Exam    Airway        Mallampati: III   TM distance: > 3 FB   Neck ROM: full   Mouth opening: > 3 cm    Respiratory Devices and Support         Dental  no notable dental history         Cardiovascular   cardiovascular exam normal       Rhythm and rate: regular     Pulmonary   pulmonary exam normal                OUTSIDE LABS:  CBC:   Lab Results   Component Value Date    WBC 8.1 06/21/2021    WBC 12.3 (H) 06/14/2021    HGB 14.1 06/21/2021    HGB 10.8 (L) 06/14/2021    HCT 41.9 06/21/2021    HCT 32.8 (L) 06/14/2021      06/21/2021     06/14/2021     BMP:   Lab Results   Component Value Date     06/21/2021     06/14/2021    POTASSIUM 5.4 (H) 06/21/2021    POTASSIUM 4.4 06/14/2021    CHLORIDE 110 (H) 06/21/2021    CHLORIDE 112 (H) 06/14/2021    CO2 25 06/21/2021    CO2 25 06/14/2021    BUN 31 (H) 06/21/2021    BUN 52 (H) 06/14/2021    CR 1.34 (H) 06/21/2021    CR 2.07 (H) 06/14/2021    GLC 92 06/21/2021     (H) 06/14/2021     COAGS:   Lab Results   Component Value Date    INR 1.37 (H) 02/25/2020     POC: No results found for: BGM, HCG, HCGS  HEPATIC:   Lab Results   Component Value Date    ALBUMIN 2.3 (L) 06/14/2021    PROTTOTAL 5.5 (L) 06/14/2021    ALT 9 06/14/2021    AST 9 06/14/2021    ALKPHOS 44 06/14/2021    BILITOTAL 0.4 06/14/2021     OTHER:   Lab Results   Component Value Date    LACT 0.6 (L) 01/02/2019    REY 9.2 06/21/2021    LIPASE 92 06/12/2021    TSH 0.78 07/31/2020    CRP 17.7 (H) 02/22/2020    SED 29 (H) 02/22/2020       Anesthesia Plan    ASA Status:  3      Anesthesia Type: General.     - Airway: LMA   Induction: Intravenous, Propofol.   Maintenance: Balanced.        Consents    Anesthesia Plan(s) and associated risks, benefits, and realistic alternatives discussed. Questions answered and patient/representative(s) expressed understanding.     - Discussed with:  Patient      - Extended Intubation/Ventilatory Support Discussed: No.      - Patient is DNR/DNI Status: No    Use of blood products discussed: No .     Postoperative Care    Pain management: Multi-modal analgesia.   PONV prophylaxis: Ondansetron (or other 5HT-3), Dexamethasone or Solumedrol     Comments:    Patient is counseled on the anesthesia plan and relevant anesthesia procedures  including all risks and benefits. All patient questions were answered.     Needs BMP stat.   No Toradol.               Vipul Elam MD

## 2021-07-02 NOTE — ANESTHESIA CARE TRANSFER NOTE
Patient: Jose Ray    Procedure(s):  CYSTOSCOPY, RIGHT URETEROSCOPY, HOLMIUM LASER LITHOTRIPSY, RIGHT URETERAL STENT EXCHANGE    Diagnosis: Kidney stone [N20.0]  Diagnosis Additional Information: No value filed.    Anesthesia Type:   General     Note:    Oropharynx: oropharynx clear of all foreign objects and spontaneously breathing  Level of Consciousness: awake  Oxygen Supplementation: face mask  Level of Supplemental Oxygen (L/min / FiO2): 6  Independent Airway: airway patency satisfactory and stable  Dentition: dentition unchanged  Vital Signs Stable: post-procedure vital signs reviewed and stable  Report to RN Given: handoff report given  Patient transferred to: PACU  Comments: At end of procedure, spontaneous respirations, adequate tidal volumes, followed commands to voice, LMA removed atraumatically, oropharynx suctioned, airway patent after LMA removal. Oxygen via facemask at 6 liters per minute to PACU. Oxygen tubing connected to wall O2 in PACU, SpO2, NiBP, and EKG monitors and alarms on and functioning, Regige Hugger warmer connected to patient gown, report on patient's clinical status given to PACU RN, RN questions answered.  Handoff Report: Identifed the Patient, Identified the Reponsible Provider, Reviewed the pertinent medical history, Discussed the surgical course, Reviewed Intra-OP anesthesia mangement and issues during anesthesia, Set expectations for post-procedure period and Allowed opportunity for questions and acknowledgement of understanding      Vitals: (Last set prior to Anesthesia Care Transfer)  CRNA VITALS  7/2/2021 1254 - 7/2/2021 1328      7/2/2021             Pulse:  76    SpO2:  99 %    Resp Rate (observed):  21    Resp Rate (set):  10        Electronically Signed By: SMITHA Davidson CRNA  July 2, 2021  1:28 PM

## 2021-07-02 NOTE — ANESTHESIA POSTPROCEDURE EVALUATION
Patient: Jose Ray    Procedure(s):  CYSTOSCOPY, RIGHT URETEROSCOPY, HOLMIUM LASER LITHOTRIPSY, RIGHT URETERAL STENT EXCHANGE    Diagnosis:Kidney stone [N20.0]  Diagnosis Additional Information: No value filed.    Anesthesia Type:  General    Note:  Disposition: Inpatient   Postop Pain Control: Uneventful            Sign Out: Well controlled pain   PONV:    Neuro/Psych: Uneventful            Sign Out: Acceptable/Baseline neuro status   Airway/Respiratory: Uneventful            Sign Out: Acceptable/Baseline resp. status   CV/Hemodynamics: Uneventful            Sign Out: Acceptable CV status   Other NRE: NONE   DID A NON-ROUTINE EVENT OCCUR? No           Last vitals:  Vitals:    07/02/21 1400 07/02/21 1415 07/02/21 1430   BP: (!) 150/91 (!) 153/92    Pulse: 66 71    Resp: 20 20    Temp:   35.7  C (96.3  F)   SpO2: 97% 97%        Last vitals prior to Anesthesia Care Transfer:  CRNA VITALS  7/2/2021 1254 - 7/2/2021 1354      7/2/2021             Pulse:  76    SpO2:  99 %    Resp Rate (observed):  21    Resp Rate (set):  10          Electronically Signed By: Vipul Elam MD  July 2, 2021  2:37 PM

## 2021-07-02 NOTE — ANESTHESIA PROCEDURE NOTES
Airway       Patient location during procedure: OR ([facility] - Operating Room or Procedural Area)       Procedure Start/Stop Times: 7/2/2021 12:33 PM and 7/2/2021 12:33 PM  Staff -        Anesthesiologist:  Vipul Elam MD       CRNA: Alec Feldman APRN CRNA       Performed By: CRNAIndications and Patient Condition       Indications for airway management: maria antonia-procedural       Induction type:intravenous       Mask difficulty assessment: 0 - not attempted    Final Airway Details       Final airway type: supraglottic airway    Supraglottic Airway Details        Type: LMA       Brand: Ambu AuraGain       LMA size: 5    Post intubation assessment        Number of attempts at approach: 1       Number of other approaches attempted: 0       Ease of procedure: easy       Dentition: Intact and Unchanged

## 2021-07-02 NOTE — DISCHARGE INSTRUCTIONS
Same Day Surgery Discharge Instructions for  Sedation and General Anesthesia       It's not unusual to feel dizzy, light-headed or faint for up to 24 hours after surgery or while taking pain medication.  If you have these symptoms: sit for a few minutes before standing and have someone assist you when you get up to walk or use the bathroom.      You should rest and relax for the next 24 hours. We recommend you make arrangements to have an adult stay with you for at least 24 hours after your discharge.  Avoid hazardous and strenuous activity.      DO NOT DRIVE any vehicle or operate mechanical equipment for 24 hours following the end of your surgery.  Even though you may feel normal, your reactions may be affected by the medication you have received.      Do not drink alcoholic beverages for 24 hours following surgery.       Slowly progress to your regular diet as you feel able. It's not unusual to feel nauseated and/or vomit after receiving anesthesia.  If you develop these symptoms, drink clear liquids (apple juice, ginger ale, broth, 7-up, etc. ) until you feel better.  If your nausea and vomiting persists for 24 hours, please notify your surgeon.        All narcotic pain medications, along with inactivity and anesthesia, can cause constipation. Drinking plenty of liquids and increasing fiber intake will help.      For any questions of a medical nature, call your surgeon.      Do not make important decisions for 24 hours.      If you had general anesthesia, you may have a sore throat for a couple of days related to the breathing tube used during surgery.  You may use Cepacol lozenges to help with this discomfort.  If it worsens or if you develop a fever, contact your surgeon.       If you feel your pain is not well managed with the pain medications prescribed by your surgeon, please contact your surgeon's office to let them know so they can address your concerns.       CoVid 19 Information    We want to give you  information regarding Covid. Please consult your primary care provider with any questions you might have.     Patient who have symptoms (cough, fever, or shortness of breath), need to isolate for 7 days from when symptoms started OR 72 hours after fever resolves (without fever reducing medications) AND improvement of respiratory symptoms (whichever is longer).      Isolate yourself at home (in own room/own bathroom if possible)    Do Not allow any visitors    Do Not go to work or school    Do Not go to Faith,  centers, shopping, or other public places.    Do Not shake hands.    Avoid close and intimate contact with others (hugging, kissing).    Follow CDC recommendations for household cleaning of frequently touched services.     After the initial 7 days, continue to isolate yourself from household members as much as possible. To continue decrease the risk of community spread and exposure, you and any members of your household should limit activities in public for 14 days after starting home isolation.     You can reference the following CDC link for helpful home isolation/care tips:  https://www.cdc.gov/coronavirus/2019-ncov/downloads/10Things.pdf    Protect Others:    Cover Your Mouth and Nose with a mask, disposable tissue or wash cloth to avoid spreading germs to others.    Wash your hands and face frequently with soap and water    Call Your Primary Doctor If: Breathing difficulty develops or you become worse.    For more information about COVID19 and options for caring for yourself at home, please visit the CDC website at https://www.cdc.gov/coronavirus/2019-ncov/about/steps-when-sick.html  For more options for care at [facility], please visit our website at [email or web address]        May resume ALL MEDICATIONS including Xeralto TOMORROW.        **If you have questions or concerns about your procedure,  call Dr. Martinez at [phone]**        Cystoscopy, Holmium Laser  with Stent Placement Discharge Instructions    Holmium laser lithotripsy was used to break up your kidney stone(s). It is normal to have visible blood in the urine, burning, urgency and frequency following this procedure. These symptoms may last for a few days to weeks.     Diet:    To help pass stone fragments and clear the blood out of the urine, it is important to drink 6-8 glasses of fluids per day at home - at least 3-4 glasses should be water.       Return to the diet that you were on before the procedure, unless you are given specific diet instructions.    Activity:    Walk short distances and increase as your strength allows.    You may climb stairs.    Do not do strenuous exercise or heavy lifting until approved by surgeon.    Do not drive while taking narcotic pain medications.    Bathing:    You may take a shower.          Stent information    During surgery, a stent was placed in the ureter.  The ureter is the tube that drains urine from the kidney to the bladder.  The stent is placed to dilate (open) the ureter so the stone fragments can pass easily through the ureter or to decrease ureteral swelling after surgery, or to relieve an obstruction. The stent is made of rubber. The upper end of the stent curls in the kidney while the lower end rests in the bladder.    While the stent is in place you may experience the following symptoms:    Blood and/or small blood clots in urine.    Bladder spasm (frequency and urgency of urination).    Discomfort or aching in the back or side where the stent is.    Burning or discomfort at the end of urine stream.    To decrease these symptoms you should:    Take pain medication as prescribed.    Drink plenty of fluids.    If you experience pain at the end of urination try not emptying your bladder completely.    If having discomfort in back or side, decrease activity.    Call your physician if these signs/symptoms are present:    Pain that is not relieved by a short rest  or ordered pain medications.    Temperature at or above 101.0 F or chills.    Inability or difficulty urinating.     Excessive blood in urine.    Any questions or concerns.

## 2021-07-02 NOTE — OP NOTE
Procedure Date: 07/02/2021    PREOPERATIVE DIAGNOSES:    1.  History of prostate cancer, status post robotic prostatectomy by me 14 years ago.  2.  Acute right renal colic secondary to a 5 x 6 mm right distal ureteral calculus.    POSTOPERATIVE DIAGNOSES:     1.  History of prostate cancer, status post robotic prostatectomy by me 14 years ago.  2.  Acute right renal colic secondary to a 5 x 6 mm right distal ureteral calculus.    PROCEDURES:     1.  Cystoscopy.  2.  Right double-J stent exchange.  3.  Right semirigid ureteroscopy.  4.  Right holmium laser lithotripsy.  5.  Stone basket manipulation.    SURGEON:  Stevenson Martinez MD    ANESTHESIA:  General.    ESTIMATED BLOOD LOSS:  Zero.     DESCRIPTION OF PROCEDURE:  The patient prepped and draped in the dorsal lithotomy position under general anesthetic in room 18, which is an endo-fluoroscopy room.  A multidisciplinary timeout was observed.  I passed a 22 [place]se cystoscope per meatus.  The anterior urethra was free of stricture disease.  The prostatic urethra was absent.  Upon entering the bladder, there was no evidence of vesicle calculi and a double-J stent was emanating from the right ureteral orifice.  The distal end of this was grasped and brought out per meatus.    I passed a stiff Glidewire through the internal lumen of the stent and removed it.    I backloaded the scope off of the wire. I then passed a second stiff Glidewire using a dual lumen ureteral catheter.  Under fluoroscopy, I could make out the radiopaque density in the right distal ureter suggestive of a known calculus.    I passed a short [place]z semirigid ureteroscope and under gentle pressure under irrigation that was slightly increased, I intubated the left ureteral orifice over the second wire. The first 1 was coiled onto the patient's thigh and attached to the drape.  I came to the stone in the distal ureter.  This was a dark brown to blackish-colored stone and, using a 365-micron laser fiber  (separate laser timeout was observed prior to institution of laser energy), using 1.2 kilojoules of energy on a stone dusting setting, I was able to break the stone into approximately 15-20 pieces.      The largest fragment was stone basketed with a 3 [place]an Yessy basket.    Rreinspection of the distal ureter found fragments that were all sub 1 mm in size and should pass with ease.    Due to the amount of laser energy (this was a very dense, likely calcium oxalate stone), I elected to pass a 5 [place]an x 28 cm double-J stent, which was passed both fluoroscopically and visually with nice curls on both ends.    I will meet with the patient in clinic in approximately 2 weeks to remove the double-J stent.  B and O suppository and Lunsford catheter was placed at the end of the procedure which will be removed in the recovery room.  The patient tolerated the procedure well and left the operating room in stable condition.    Stevenson Martinez MD        D: 2021   T: 2021   MT: HAILEY    Name:     SHAN VALERA  MRN:      [id number]        Account:        [id number]   :      1935           Procedure Date: 2021     Document: [id number]

## 2021-07-06 LAB — INTERPRETATION ECG - MUSE: NORMAL

## 2021-07-09 LAB
APPEARANCE STONE: NORMAL
COMPN STONE: NORMAL
NUMBER STONE: 3
SIZE STONE: NORMAL MM
WT STONE: 41 MG

## 2021-07-20 ENCOUNTER — MYC MEDICAL ADVICE (OUTPATIENT)
Dept: FAMILY MEDICINE | Facility: CLINIC | Age: 86
End: 2021-07-20

## 2021-07-21 NOTE — TELEPHONE ENCOUNTER
PCP, please see patient's My Chart medication question.     Roxy Howard RN  [facility]

## 2021-07-21 NOTE — TELEPHONE ENCOUNTER
Patient called back and states that he will be in clinic to see Dr. Lockett at 11:45 on 7/22. Writer attempted to schedule, but was not able to override.   Please add the patient to Dr. Lockett's schedule.  Lorraine Hahn RN

## 2021-07-21 NOTE — TELEPHONE ENCOUNTER
Called patient, no answer at either number    LVM for him to call back    When calls back can offer 11:45am 7/22/21 with PCP     Teresa TRUJILLO RN

## 2021-07-21 NOTE — TELEPHONE ENCOUNTER
Called pt left voice message informing pt of appt date time and location.     Appt with Dr. Lockett at 11:45 on 7/22 at [facility].

## 2021-07-22 ENCOUNTER — OFFICE VISIT (OUTPATIENT)
Dept: FAMILY MEDICINE | Facility: CLINIC | Age: 86
End: 2021-07-22
Payer: COMMERCIAL

## 2021-07-22 VITALS
HEIGHT: 69 IN | WEIGHT: 172 LBS | OXYGEN SATURATION: 98 % | TEMPERATURE: 97 F | BODY MASS INDEX: 25.48 KG/M2 | DIASTOLIC BLOOD PRESSURE: 75 MMHG | SYSTOLIC BLOOD PRESSURE: 136 MMHG | HEART RATE: 68 BPM

## 2021-07-22 DIAGNOSIS — I10 BENIGN ESSENTIAL HYPERTENSION: ICD-10-CM

## 2021-07-22 DIAGNOSIS — R60.9 EDEMA, UNSPECIFIED TYPE: Primary | ICD-10-CM

## 2021-07-22 PROCEDURE — 80048 BASIC METABOLIC PNL TOTAL CA: CPT | Performed by: INTERNAL MEDICINE

## 2021-07-22 PROCEDURE — 36415 COLL VENOUS BLD VENIPUNCTURE: CPT | Performed by: INTERNAL MEDICINE

## 2021-07-22 PROCEDURE — 99213 OFFICE O/P EST LOW 20 MIN: CPT | Performed by: INTERNAL MEDICINE

## 2021-07-22 RX ORDER — HYDROCHLOROTHIAZIDE 12.5 MG/1
12.5 TABLET ORAL DAILY
Start: 2021-07-22 | End: 2021-09-01

## 2021-07-22 ASSESSMENT — MIFFLIN-ST. JEOR: SCORE: 1455.57

## 2021-07-22 NOTE — PROGRESS NOTES
The patient is here for lower extremity edema.  This started several days ago.  He has had no chest pain or shortness of breath.  No PND.  He has a history of DVTs but has been back on his Xarelto for over 10 days.  He has been off his hydrochlorothiazide and losartan.  He was on these for hypertension but then had kidney stones and they were stopped as of around June 12.  He feels very well.  No nausea or vomiting.    Past Medical History:   Diagnosis Date     Acute renal failure with tubular necrosis (H) 06/2021    due to renal stone and hydro     Adenomatous colon polyp 01/2019     Complex renal cyst 06/2014    seen on us done for htn, fu 6 months, fu done 8/15 and stable; fu 6/18 simple cyst, a bit larger     Diverticular hemorrhage 01/2019    colonic, hosp fsd     DVT, lower extremity, distal, acute, right (H) 02/2020, 1996    gastrocnemius vein in 2020, not sure which vein in 1996, factor 5 leiden neg 2020     Eczema      Gallstone 2010    seen on ct for renal stone     Generalized osteoarthrosis, unspecified site      HTN (hypertension) 06/2014    us done and no hydro but renal cyst, had edema with norvasc 5mg, added hctz and stopped norvasc 11/20     Hx of colonoscopy 2011    nl     Hydronephrosis of left kidney 2010    seen on above ct     Nephrolithiasis 2010    seen on ct, hydronephrosis seen as well; had richy and hydro 6/21, stenting done.     Prostate cancer (H) 2008    had surgery, D.fausto Martinez     Skin cancer, basal cell 2002, 2008    had xrt 2002, mohs surgery 2008     Thrombophlebitis 1985     Past Surgical History:   Procedure Laterality Date     CIRCUMCISION  3/12/2013    Procedure: CIRCUMCISION;  CIRCUMCISION;  Surgeon: Stevenson Martinez MD;  Location: [facility]     [facility]  2011; 2019    [facility]     GENITOURINARY SURGERY      prostatectomy     HERNIA REPAIR  1961    Hernia     LASER HOLMIUM LITHOTRIPSY URETER(S), INSERT STENT, COMBINED Right 6/13/2021    Procedure: Cystoscopy, right  ureter stent placement;  Surgeon: Rishi Vincent MD;  Location:  OR     LASER HOLMIUM LITHOTRIPSY URETER(S), INSERT STENT, COMBINED Right 7/2/2021    Procedure: CYSTOSCOPY, RIGHT URETEROSCOPY, HOLMIUM LASER LITHOTRIPSY, RIGHT URETERAL STENT EXCHANGE;  Surgeon: Stevenson Martinez MD;  Location:  OR     mohs surgery  2008, 2011    basal cell     VASCULAR SURGERY  1985    vein stripping both legs some     [facility] NONSPECIFIC PROCEDURE  13, 36    HERNIORRHAPHY     [facility] NONSPECIFIC PROCEDURE  1985    VEIN STRIPPING     Social History     Socioeconomic History     Marital status:      Spouse name: Not on file     Number of children: 3     Years of education: Not on file     Highest education level: Not on file   Occupational History     Occupation: [employer]     Employer: RETIRED   Tobacco Use     Smoking status: Never Smoker     Smokeless tobacco: Never Used   Substance and Sexual Activity     Alcohol use: Yes     Alcohol/week: 0.0 standard drinks     Comment: occasional     Drug use: No     Sexual activity: Not Currently     Partners: Male   Other Topics Concern     Parent/sibling w/ CABG, MI or angioplasty before 65F 55M? No   Social History Narrative     Not on file     Social Determinants of Health     Financial Resource Strain:      Difficulty of Paying Living Expenses:    Food Insecurity:      Worried About Running Out of Food in the Last Year:      Ran Out of Food in the Last Year:    Transportation Needs:      Lack of Transportation (Medical):      Lack of Transportation (Non-Medical):    Physical Activity:      Days of Exercise per Week:      Minutes of Exercise per Session:    Stress:      Feeling of Stress :    Social Connections:      Frequency of Communication with Friends and Family:      Frequency of Social Gatherings with Friends and Family:      Attends Sikh Services:      Active Member of Clubs or Organizations:      Attends Club or Organization Meetings:      Marital Status:   "  Intimate Partner Violence:      Fear of Current or Ex-Partner:      Emotionally Abused:      Physically Abused:      Sexually Abused:      Current Outpatient Medications   Medication Sig Dispense Refill     ascorbic acid 1000 MG TABS tablet Take 1,000 mg by mouth 2 times daily       Cholecalciferol (VITAMIN D PO) Take 1,000 Units by mouth daily        dorzolamide-timolol PF (COSOPT) 22.3-6.8 MG/ML opthalmic solution Place 1 drop into both eyes 2 times daily       hydrochlorothiazide (HYDRODIURIL) 12.5 MG tablet Take 1 tablet (12.5 mg) by mouth daily       ipratropium (ATROVENT) 0.06 % nasal spray Spray 2 sprays into both nostrils 2 times daily 15 mL 1     latanoprost (XALATAN) 0.005 % ophthalmic solution Place 1 drop into both eyes daily        MULTIPLE VITAMIN PO Take 1 tablet by mouth every morning       rivaroxaban ANTICOAGULANT (XARELTO ANTICOAGULANT) 20 MG TABS tablet Take 1 tablet (20 mg) by mouth daily (with dinner) 90 tablet 3     tamsulosin (FLOMAX) 0.4 MG capsule Take 1 capsule (0.4 mg) by mouth daily 30 capsule 0     triamcinolone (KENALOG) 0.1 % external cream Apply topically daily as needed for irritation (Back)        vitamin B complex with vitamin C (VITAMIN  B COMPLEX) tablet Take 1 tablet by mouth daily       zinc gluconate 50 MG tablet Take 50 mg by mouth daily       Allergies   Allergen Reactions     Cat Hair [Cats]      Pneumococcal Vaccine Rash     FAMILY HISTORY NOTED AND REVIEWED    REVIEW OF SYSTEMS: above    PHYSICAL EXAM    /75 (BP Location: Right arm, Patient Position: Chair, Cuff Size: Adult Large)   Pulse 68   Temp 97  F (36.1  C) (Oral)   Ht 1.753 m (5' 9\")   Wt 78 kg (172 lb)   SpO2 98%   BMI 25.40 kg/m      Patient appears non toxic  Lungs clear  cv reglar rate and rhythm, no murmer, rub or gallop, no jvp, 1+ bilat lower leg edema  Abdomen normal active bowel sounds, soft non-tender, no mgr, no hepatosplenomegaly    Bmp to be done    ASSESSMENT:  Leg edema, suspect from " being off hydrochlorothiazide, also v.v, doubt chf, hepatic, thyroid or renal    PLAN:  Bmp  Hydrochlorothiazide 12.5mg  Call if edema, not resolving over next week    Hosea Lockett M.D.

## 2021-07-22 NOTE — PATIENT INSTRUCTIONS
Resume the hydrochlorothiazide.  Your edema should go down over the next week.  If not let me know.    Hosea Lockett M.D.

## 2021-07-25 LAB
ANION GAP SERPL CALCULATED.3IONS-SCNC: 6 MMOL/L (ref 3–14)
BUN SERPL-MCNC: 20 MG/DL (ref 7–30)
CALCIUM SERPL-MCNC: 8.7 MG/DL (ref 8.5–10.1)
CHLORIDE BLD-SCNC: 111 MMOL/L (ref 94–109)
CO2 SERPL-SCNC: 24 MMOL/L (ref 20–32)
CREAT SERPL-MCNC: 1.23 MG/DL (ref 0.66–1.25)
GFR SERPL CREATININE-BSD FRML MDRD: 53 ML/MIN/1.73M2
GLUCOSE BLD-MCNC: 74 MG/DL (ref 70–99)
POTASSIUM BLD-SCNC: 4.4 MMOL/L (ref 3.4–5.3)
SODIUM SERPL-SCNC: 141 MMOL/L (ref 133–144)

## 2021-07-25 NOTE — RESULT ENCOUNTER NOTE
It was a please seeing you.  You should be able to view your labs.    The labs look very good and the kidney function or creatinine is back to normal.    If you have any questions please call me.    Hosea Lockett M.D.

## 2021-08-11 ENCOUNTER — TRANSFERRED RECORDS (OUTPATIENT)
Dept: HEALTH INFORMATION MANAGEMENT | Facility: CLINIC | Age: 86
End: 2021-08-11

## 2021-08-31 DIAGNOSIS — I10 BENIGN ESSENTIAL HYPERTENSION: ICD-10-CM

## 2021-09-01 RX ORDER — HYDROCHLOROTHIAZIDE 12.5 MG/1
12.5 TABLET ORAL DAILY
Qty: 90 TABLET | Refills: 3 | Status: SHIPPED | OUTPATIENT
Start: 2021-09-01 | End: 2022-09-22

## 2021-09-13 DIAGNOSIS — J34.89 RHINORRHEA: ICD-10-CM

## 2021-09-14 DIAGNOSIS — J34.89 RHINORRHEA: ICD-10-CM

## 2021-09-15 RX ORDER — IPRATROPIUM BROMIDE 42 UG/1
2 SPRAY, METERED NASAL
Qty: 15 ML | Refills: 1 | OUTPATIENT
Start: 2021-09-15

## 2021-09-15 NOTE — TELEPHONE ENCOUNTER
Interface request from pharmacy on 9/14/2021  Another fax from pharmacy on 9/15/2021    LOV 7- [facility] future OV scheduled    Elba Presley RT (R)

## 2021-09-16 RX ORDER — IPRATROPIUM BROMIDE 42 UG/1
SPRAY, METERED NASAL
Qty: 15 ML | Refills: 2 | Status: SHIPPED | OUTPATIENT
Start: 2021-09-16 | End: 2022-09-22

## 2021-10-24 ENCOUNTER — HEALTH MAINTENANCE LETTER (OUTPATIENT)
Age: 86
End: 2021-10-24

## 2022-01-11 NOTE — TELEPHONE ENCOUNTER
rivaroxaban ANTICOAGULANT (XARELTO ANTICOAGULANT) 20 MG TABS tablet 90 tablet 3 4/12/2021  No   Sig - Route: Take 1 tablet (20 mg) by mouth daily (with dinner) - Oral   Sent to pharmacy as: Rivaroxaban 20 MG Oral Tablet (XARELTO ANTICOAGULANT)   Class: E-Prescribe   Order: [id number]   E-Prescribing Status: Receipt confirmed by pharmacy (4/12/2021  4:45 PM CDT)       Printout Tracking    External Result Report     Pharmacy    [facility] - [place], [place] - [address] [place] [address]     Pharmacy seeking refill of xarelto.  Last Rx'd on 4- good for one year .    Too soon to renew.    Fax sent back to pharmacy - check records for Rx; not due to renew.    RT Bryce (R)

## 2022-02-13 ENCOUNTER — HEALTH MAINTENANCE LETTER (OUTPATIENT)
Age: 87
End: 2022-02-13

## 2022-05-01 NOTE — PLAN OF CARE
Patient discharged at 5:20 PM to home.  IV was discontinued. Pain at time of discharge was 0. Belongings returned to patient.  Discharge instructions and medications reviewed with patient and pt's wife.  Patient verbalized understanding and all questions were answered.  Prescriptions given to patient.  At time of discharge, patient condition was stable and left the unit via wheelchair escorted by transport.    
A&Ox4. VSS on RA. Denies pain. Regular diet. Lunsford removed at 1000, voided once adequately with 0 mL post void residual. Continue to strain urine. Urology has signed off. BS active, pt reports constipation, PRN Senna given. R PIV infusing NS @ 100 mL/hr w/ intermittent abx. Ind in room, SBA in roach. Plan to discharge home on PO abx, hospitalist paged for possible discharge yet today.   
Arrived from ED @ 0200. A&Ox4. VSS on RA. Denies pain. SBA. Continent. No BM in 3 days. NPO ex meds. R PIV infusing NS @ 100 mL/hr w/ intermittent Rocephin. Creat 2.85. Urology consulted.   
POD 0, Cystoscopy with right ureter stent placement. Pt came up from PACU around 1230 PM. A/O x4. VSS. Weaned off O2, satting in mid 90's on RA. IS teaching provided and use encouraged. Denies pain. Tolerating regular diet. Denies N/V. Pierce patent with adequate cloudy dark tea colored OP. Strained urine with no stone. BS active, no flatus. Plan to get up and ambulate this evening after dinner. PIV infusing IVF. Plan to remove pierce tomorrow AM and discharge home with po abx.   
POD1. A&Ox4. VSS on RA. Denies pain. Denies nausea. Regular diet. Lunsford patent w/ adequate cloudy, bereket UOP - urine strained w/ no stones. BS active, denies flatus. R PIV infusing NS @ 100 mL/hr w/ intermittent abx. Started on PO Cipro. SBA in halls, IND in room. Ambulated halls x2 this evening. Plan to discharge home on PO abx.   
Patient/Spouse

## 2022-06-01 ENCOUNTER — OFFICE VISIT (OUTPATIENT)
Dept: URGENT CARE | Facility: URGENT CARE | Age: 87
End: 2022-06-01
Payer: COMMERCIAL

## 2022-06-01 VITALS
SYSTOLIC BLOOD PRESSURE: 163 MMHG | TEMPERATURE: 97 F | DIASTOLIC BLOOD PRESSURE: 72 MMHG | HEART RATE: 50 BPM | OXYGEN SATURATION: 98 %

## 2022-06-01 DIAGNOSIS — R04.0 EPISTAXIS: Primary | ICD-10-CM

## 2022-06-01 PROCEDURE — 99214 OFFICE O/P EST MOD 30 MIN: CPT | Performed by: PHYSICIAN ASSISTANT

## 2022-06-08 NOTE — PROGRESS NOTES
SUBJECTIVE:  Jose Ray is a 87 year old male who presents with persistent nosebleed for several hours despite pressure.   Severity: moderate   Timing:still present  Additional symptoms include none.      History of recurrent otitis: no    Past Medical History:   Diagnosis Date     Acute renal failure with tubular necrosis (H) 06/2021    due to renal stone and hydro     Adenomatous colon polyp 01/2019     Complex renal cyst 06/2014    seen on us done for htn, fu 6 months, fu done 8/15 and stable; fu 6/18 simple cyst, a bit larger     Diverticular hemorrhage 01/2019    colonic, hosp fsd     DVT, lower extremity, distal, acute, right (H) 02/2020, 1996    gastrocnemius vein in 2020, not sure which vein in 1996, factor 5 leiden neg 2020     Eczema      Gallstone 2010    seen on ct for renal stone     Generalized osteoarthrosis, unspecified site      HTN (hypertension) 06/2014    us done and no hydro but renal cyst, had edema with norvasc 5mg, added hctz and stopped norvasc 11/20     Hx of colonoscopy 2011    nl     Hydronephrosis of left kidney 2010    seen on above ct     Nephrolithiasis 2010    seen on ct, hydronephrosis seen as well; had richy and hydro 6/21, stenting done.     Prostate cancer (H) 2008    had surgery, D.r Michelle     Skin cancer, basal cell 2002, 2008    had xrt 2002, mohs surgery 2008     Thrombophlebitis 1985     Current Outpatient Medications   Medication Sig Dispense Refill     ascorbic acid 1000 MG TABS tablet Take 1,000 mg by mouth 2 times daily       Cholecalciferol (VITAMIN D PO) Take 1,000 Units by mouth daily        dorzolamide-timolol PF (COSOPT) 22.3-6.8 MG/ML opthalmic solution Place 1 drop into both eyes 2 times daily       hydrochlorothiazide (HYDRODIURIL) 12.5 MG tablet TAKE 1 TABLET (12.5 MG) BY MOUTH DAILY 90 tablet 3     ipratropium (ATROVENT) 0.06 % nasal spray INSTILL 2 SPRAYS INTO BOTH NOSTRILS 2 TIMES DAILY 15 mL 2     latanoprost (XALATAN) 0.005 % ophthalmic solution Place 1  drop into both eyes daily        MULTIPLE VITAMIN PO Take 1 tablet by mouth every morning       rivaroxaban ANTICOAGULANT (XARELTO ANTICOAGULANT) 20 MG TABS tablet Take 1 tablet (20 mg) by mouth daily (with dinner) 90 tablet 3     triamcinolone (KENALOG) 0.1 % external cream Apply topically daily as needed for irritation (Back)        vitamin B complex with vitamin C (STRESS TAB) tablet Take 1 tablet by mouth daily       zinc gluconate 50 MG tablet Take 50 mg by mouth daily       tamsulosin (FLOMAX) 0.4 MG capsule Take 1 capsule (0.4 mg) by mouth daily 30 capsule 0     Social History     Tobacco Use     Smoking status: Never Smoker     Smokeless tobacco: Never Used   Substance Use Topics     Alcohol use: Yes     Alcohol/week: 0.0 standard drinks     Comment: occasional       ROS:   Review of systems negative except as stated above.    OBJECTIVE:  BP (!) 163/72 (BP Location: Right arm, Patient Position: Sitting, Cuff Size: Adult Regular)   Pulse 50   Temp 97  F (36.1  C) (Tympanic)   SpO2 98%    EXAM:  GENERAL APPEARANCE: healthy, alert and no distress  EYES:  conjunctiva clear  HENT: unable to stop bleed  NECK: supple, nontender, no lymphadenopathy  RESP: No labored or rapid breathing.  No retractions.  Lungs clear to auscultation - no rales, rhonchi or wheezes  CV: regular rates and rhythm, normal S1 S2, no murmur noted  NEURO: Normal strength and tone  SKIN: no suspicious cyanosis, lesions or rashes      ASSESSMENT:  (R04.0) Epistaxis  (primary encounter diagnosis)  Comment: directed to ER  Plan: ER by private vehicle

## 2022-06-13 ENCOUNTER — TRANSFERRED RECORDS (OUTPATIENT)
Dept: HEALTH INFORMATION MANAGEMENT | Facility: CLINIC | Age: 87
End: 2022-06-13
Payer: COMMERCIAL

## 2022-06-21 DIAGNOSIS — I82.4Z1 DVT, LOWER EXTREMITY, DISTAL, ACUTE, RIGHT (H): ICD-10-CM

## 2022-07-07 ENCOUNTER — TRANSFERRED RECORDS (OUTPATIENT)
Dept: FAMILY MEDICINE | Facility: CLINIC | Age: 87
End: 2022-07-07

## 2022-10-10 ENCOUNTER — OFFICE VISIT (OUTPATIENT)
Dept: FAMILY MEDICINE | Facility: CLINIC | Age: 87
End: 2022-10-10
Payer: COMMERCIAL

## 2022-10-10 VITALS
BODY MASS INDEX: 24.59 KG/M2 | WEIGHT: 166 LBS | HEIGHT: 69 IN | TEMPERATURE: 97.7 F | RESPIRATION RATE: 16 BRPM | DIASTOLIC BLOOD PRESSURE: 77 MMHG | SYSTOLIC BLOOD PRESSURE: 128 MMHG | OXYGEN SATURATION: 99 % | HEART RATE: 71 BPM

## 2022-10-10 DIAGNOSIS — C61 PROSTATE CANCER (H): Primary | ICD-10-CM

## 2022-10-10 DIAGNOSIS — R60.0 BILATERAL LEG EDEMA: ICD-10-CM

## 2022-10-10 DIAGNOSIS — I10 BENIGN ESSENTIAL HYPERTENSION: ICD-10-CM

## 2022-10-10 DIAGNOSIS — I82.4Z1 DVT, LOWER EXTREMITY, DISTAL, ACUTE, RIGHT (H): ICD-10-CM

## 2022-10-10 DIAGNOSIS — N18.31 STAGE 3A CHRONIC KIDNEY DISEASE (H): ICD-10-CM

## 2022-10-10 PROBLEM — N17.0 ACUTE RENAL FAILURE WITH TUBULAR NECROSIS (H): Status: RESOLVED | Noted: 2021-06-01 | Resolved: 2022-10-10

## 2022-10-10 PROBLEM — R19.4 DECREASED STOOLING: Status: RESOLVED | Noted: 2021-06-13 | Resolved: 2022-10-10

## 2022-10-10 LAB
ERYTHROCYTE [DISTWIDTH] IN BLOOD BY AUTOMATED COUNT: 12.7 % (ref 10–15)
HCT VFR BLD AUTO: 40.1 % (ref 40–53)
HGB BLD-MCNC: 13.5 G/DL (ref 13.3–17.7)
MCH RBC QN AUTO: 32.9 PG (ref 26.5–33)
MCHC RBC AUTO-ENTMCNC: 33.7 G/DL (ref 31.5–36.5)
MCV RBC AUTO: 98 FL (ref 78–100)
PLATELET # BLD AUTO: 254 10E3/UL (ref 150–450)
RBC # BLD AUTO: 4.1 10E6/UL (ref 4.4–5.9)
WBC # BLD AUTO: 8.1 10E3/UL (ref 4–11)

## 2022-10-10 PROCEDURE — 85027 COMPLETE CBC AUTOMATED: CPT | Performed by: INTERNAL MEDICINE

## 2022-10-10 PROCEDURE — 36415 COLL VENOUS BLD VENIPUNCTURE: CPT | Performed by: INTERNAL MEDICINE

## 2022-10-10 PROCEDURE — 99214 OFFICE O/P EST MOD 30 MIN: CPT | Performed by: INTERNAL MEDICINE

## 2022-10-10 PROCEDURE — 80048 BASIC METABOLIC PNL TOTAL CA: CPT | Performed by: INTERNAL MEDICINE

## 2022-10-10 RX ORDER — HYDROCHLOROTHIAZIDE 12.5 MG/1
12.5 TABLET ORAL DAILY
Qty: 90 TABLET | Refills: 3 | Status: SHIPPED | OUTPATIENT
Start: 2022-10-10 | End: 2023-08-24

## 2022-10-10 ASSESSMENT — PAIN SCALES - GENERAL: PAINLEVEL: NO PAIN (0)

## 2022-10-10 NOTE — PROGRESS NOTES
This is a very pleasant patient here for follow-up on multiple issues.    The patient has hypertension for which she is only on hydrochlorothiazide.  He was on losartan in the past but then had a renal stones and developed acute renal failure and has been off it since.  His blood pressure is quite good today.    The patient has CKD, I will get follow-up labs for this.    Patient has bilateral leg edema with varicosities.  This is not new or changing.    The patient has a history of DVTs as noted and is on lifelong Xarelto for this.    He has a history of prostate cancer with no evidence of disease.    He is planning on getting the flu shot and the COVID booster at the pharmacy.  He feels quite well.  No chest pain or shortness of breath or other complaints.    Past Medical History:   Diagnosis Date     Acute renal failure with tubular necrosis (H) 06/2021    due to renal stone and hydro     Adenomatous colon polyp 01/2019     Complex renal cyst 06/2014    seen on us done for htn, fu 6 months, fu done 8/15 and stable; fu 6/18 simple cyst, a bit larger     Diverticular hemorrhage 01/2019    colonic, hosp fsd     DVT, lower extremity, distal, acute, right (H) 02/2020, 1996    gastrocnemius vein in 2020, not sure which vein in 1996, factor 5 leiden neg 2020; lifelong therapy     Eczema      Gallstone 2010    seen on ct for renal stone     Generalized osteoarthrosis, unspecified site      HTN (hypertension) 06/2014    us done and no hydro but renal cyst, had edema with norvasc 5mg, added hctz and stopped norvasc 11/20     Hx of colonoscopy 2011    nl     Hydronephrosis of left kidney 2010    seen on above ct     Nephrolithiasis 2010    seen on ct, hydronephrosis seen as well; had richy and hydro 6/21, stenting done.     Prostate cancer (H) 2008    had surgery, D.r Michelle     Skin cancer, basal cell 2002, 2008    had xrt 2002, mohs surgery 2008     Thrombophlebitis 1985     Past Surgical History:   Procedure Laterality Date      CIRCUMCISION  3/12/2013    Procedure: CIRCUMCISION;  CIRCUMCISION;  Surgeon: Stevenson Martinez MD;  Location: [facility]     COLONOSCOPY  2011; 2019    Dr. Wills; [facility]     GENITOURINARY SURGERY      prostatectomy     HERNIA REPAIR  1961    Hernia     LASER HOLMIUM LITHOTRIPSY URETER(S), INSERT STENT, COMBINED Right 6/13/2021    Procedure: Cystoscopy, right ureter stent placement;  Surgeon: Rishi Vincent MD;  Location:  OR     LASER HOLMIUM LITHOTRIPSY URETER(S), INSERT STENT, COMBINED Right 7/2/2021    Procedure: CYSTOSCOPY, RIGHT URETEROSCOPY, HOLMIUM LASER LITHOTRIPSY, RIGHT URETERAL STENT EXCHANGE;  Surgeon: Stevenson Martinez MD;  Location:  OR     [facility]  [facility]    basal cell     VASCULAR SURGERY  1985    vein stripping both legs some     ZZ NONSPECIFIC PROCEDURE  13, 36    HERNIORRHAPHY     [facility] NONSPECIFIC PROCEDURE  1985    VEIN STRIPPING     Social History     Socioeconomic History     Marital status:      Spouse name: Not on file     Number of children: 3     Years of education: Not on file     Highest education level: Not on file   Occupational History     Occupation: [employer]     Employer: RETIRED   Tobacco Use     Smoking status: Never     Smokeless tobacco: Never   Substance and Sexual Activity     Alcohol use: Yes     Alcohol/week: 0.0 standard drinks     Comment: occasional     Drug use: No     Sexual activity: Not Currently     Partners: Male   Other Topics Concern     Parent/sibling w/ CABG, MI or angioplasty before 65F 55M? No   Social History Narrative     Not on file     Social Determinants of Health     Financial Resource Strain: Not on file   Food Insecurity: Not on file   Transportation Needs: Not on file   Physical Activity: Not on file   Stress: Not on file   Social Connections: Not on file   Intimate Partner Violence: Not on file   Housing Stability: Not on file     Current Outpatient Medications   Medication Sig Dispense Refill     ascorbic acid  "1000 MG TABS tablet Take 1,000 mg by mouth 2 times daily       Cholecalciferol (VITAMIN D PO) Take 1,000 Units by mouth daily        dorzolamide-timolol PF (COSOPT) 22.3-6.8 MG/ML opthalmic solution Place 1 drop into both eyes 2 times daily       hydrochlorothiazide (HYDRODIURIL) 12.5 MG tablet Take 1 tablet (12.5 mg) by mouth daily 90 tablet 3     latanoprost (XALATAN) 0.005 % ophthalmic solution Place 1 drop into both eyes daily        MULTIPLE VITAMIN PO Take 1 tablet by mouth every morning       rivaroxaban ANTICOAGULANT (XARELTO ANTICOAGULANT) 20 MG TABS tablet TAKE 1 TABLET (20 MG) BY MOUTH DAILY (WITH DINNER) 90 tablet 3     triamcinolone (KENALOG) 0.1 % external cream Apply topically daily as needed for irritation (Back)        vitamin B complex with vitamin C (STRESS TAB) tablet Take 1 tablet by mouth daily       zinc gluconate 50 MG tablet Take 50 mg by mouth daily       Allergies   Allergen Reactions     Cat Hair [Cats]      Pneumococcal Vaccine Rash     FAMILY HISTORY NOTED AND REVIEWED    REVIEW OF SYSTEMS: above    PHYSICAL EXAM    /77 (BP Location: Right arm, Patient Position: Sitting, Cuff Size: Adult Regular)   Pulse 71   Temp 97.7  F (36.5  C) (Temporal)   Resp 16   Ht 1.753 m (5' 9\")   Wt 75.3 kg (166 lb)   SpO2 99%   BMI 24.51 kg/m      Patient appears non toxic  No supraclavicular, cervical or axillary lymphadenopathy  Lungs faint ll crackles  cv reglar rate and rhythm, no murmer, rub or gallop, no jvp, trace bilat lower leg edema  Abdomen normal active bowel sounds, soft non-tender, no mgr, no hepatosplenomegaly  His lower legs show bilat v.v with venous stasis changes    Labs sent    ASSESSMENT:  1. Hypertension, controlled  2. Cap, concepción  3. Ckd, follow up labs  4. Leg edema, due to vv  5. Dvt, no issues  6. Health care maintenance    PLAN:  covid and flu shots at pharm  [employer]  Labs now  Follow up yearly     Hosea Lockett M.D.        "

## 2022-10-11 LAB
ANION GAP SERPL CALCULATED.3IONS-SCNC: 8 MMOL/L (ref 3–14)
BUN SERPL-MCNC: 32 MG/DL (ref 7–30)
CALCIUM SERPL-MCNC: 8.9 MG/DL (ref 8.5–10.1)
CHLORIDE BLD-SCNC: 110 MMOL/L (ref 94–109)
CO2 SERPL-SCNC: 25 MMOL/L (ref 20–32)
CREAT SERPL-MCNC: 1.32 MG/DL (ref 0.66–1.25)
GFR SERPL CREATININE-BSD FRML MDRD: 53 ML/MIN/1.73M2
GLUCOSE BLD-MCNC: 137 MG/DL (ref 70–99)
POTASSIUM BLD-SCNC: 3.5 MMOL/L (ref 3.4–5.3)
SODIUM SERPL-SCNC: 143 MMOL/L (ref 133–144)

## 2022-10-11 NOTE — RESULT ENCOUNTER NOTE
It was nice to see you.  Your should be able to view the lab results.    Your labs look fine and are very stable.  If you have any questions please call me.    Hosea Lockett M.D.

## 2022-10-16 ENCOUNTER — HEALTH MAINTENANCE LETTER (OUTPATIENT)
Age: 87
End: 2022-10-16

## 2023-01-24 ENCOUNTER — TRANSFERRED RECORDS (OUTPATIENT)
Dept: HEALTH INFORMATION MANAGEMENT | Facility: CLINIC | Age: 88
End: 2023-01-24

## 2023-03-26 ENCOUNTER — HEALTH MAINTENANCE LETTER (OUTPATIENT)
Age: 88
End: 2023-03-26

## 2023-03-31 NOTE — ED TRIAGE NOTES
Abd pain: low-mid abd x1 week.  And now no BM for 3 days  
You can access the FollowMyHealth Patient Portal offered by [facility] by registering at the following website: [email or web address]. By joining [facility]’s FollowMyHealth portal, you will also be able to view your health information using other applications (apps) compatible with our system.

## 2023-05-03 ENCOUNTER — NURSE TRIAGE (OUTPATIENT)
Dept: FAMILY MEDICINE | Facility: CLINIC | Age: 88
End: 2023-05-03

## 2023-05-03 ENCOUNTER — OFFICE VISIT (OUTPATIENT)
Dept: FAMILY MEDICINE | Facility: CLINIC | Age: 88
End: 2023-05-03
Payer: COMMERCIAL

## 2023-05-03 VITALS
SYSTOLIC BLOOD PRESSURE: 129 MMHG | DIASTOLIC BLOOD PRESSURE: 85 MMHG | HEART RATE: 60 BPM | HEIGHT: 69 IN | TEMPERATURE: 97.4 F | OXYGEN SATURATION: 98 % | WEIGHT: 168.2 LBS | BODY MASS INDEX: 24.91 KG/M2

## 2023-05-03 DIAGNOSIS — S91.302A OPEN WOUND OF LEFT FOOT, INITIAL ENCOUNTER: Primary | ICD-10-CM

## 2023-05-03 DIAGNOSIS — I83.93 VARICOSE VEINS OF BOTH LOWER EXTREMITIES, UNSPECIFIED WHETHER COMPLICATED: ICD-10-CM

## 2023-05-03 DIAGNOSIS — I82.4Z1 DVT, LOWER EXTREMITY, DISTAL, ACUTE, RIGHT (H): ICD-10-CM

## 2023-05-03 LAB — HGB BLD-MCNC: 13.1 G/DL (ref 13.3–17.7)

## 2023-05-03 PROCEDURE — 85018 HEMOGLOBIN: CPT | Performed by: PHYSICIAN ASSISTANT

## 2023-05-03 PROCEDURE — 99213 OFFICE O/P EST LOW 20 MIN: CPT | Performed by: PHYSICIAN ASSISTANT

## 2023-05-03 PROCEDURE — 36415 COLL VENOUS BLD VENIPUNCTURE: CPT | Performed by: PHYSICIAN ASSISTANT

## 2023-05-03 RX ORDER — FOLIC ACID 0.8 MG
TABLET ORAL
COMMUNITY

## 2023-05-03 ASSESSMENT — PAIN SCALES - GENERAL: PAINLEVEL: NO PAIN (0)

## 2023-05-03 NOTE — PROGRESS NOTES
Assessment and Plan:     (S91.302A) Open wound of left foot, initial encounter  (primary encounter diagnosis)  Comment: very small, about size of pencil eraser, started as blister after wearing shoes w/out socks, over a varicose vein, had some bleeding this am, on xarelto  Plan: Wound Care Referral, Hemoglobin  Keep area clean, apply dressing x 7-10 days (adaptic, telfa and 4inch ace provided today), change dressing daily and as needed  Can open to air if healing nicely and no bleeding issues  Monitor for signs of infection, wife is RN, discussed when to be seen promptly   Wound clinic if does not heal, referral placed today    (I83.93) Varicose veins of both lower extremities, unspecified whether complicated  Comment:   Plan:     (I82.4Z1) DVT, lower extremity, distal, acute, right (H)  Comment: on xarelto  Plan:         Ana Keller PA-C        Subjective   Salvador is a 88 year old, presenting for the following health issues:  Foot Problems         View : No data to display.              History of Present Illness       Reason for visit:  Excessive bleeding from foot wound  Symptom onset:  1-2 weeks ago  Symptoms include:  Bleeding, on Xarelto  Symptom intensity:  Severe  Symptom progression:  Staying the same  Had these symptoms before:  Yes  Has tried/received treatment for these symptoms:  No  What makes it worse:  No  What makes it better:  Stop the bleeding    He eats 4 or more servings of fruits and vegetables daily.He consumes 1 sweetened beverage(s) daily.He exercises with enough effort to increase his heart rate 9 or less minutes per day.  He exercises with enough effort to increase his heart rate 3 or less days per week.   He is taking medications regularly.     Mr. Ray was doing some yard work w/out shoes on about 10 days ago  When he took his shoes off he noticed a bleeding blister on the dorsal aspect of left foot  He applied a band aid then didn't have any issues with it until  "today  Today, he noticed some bleeding w/some blood \"squirting\" from the wound until he applied pressure  He applied pressure then the bleeding stopped   He is on xarelto for history of DVT  He also has varicose veins  He denies any pain, swelling or purulent drainage from the area    Review of Systems   See above      Objective    /85 (BP Location: Left arm, Patient Position: Sitting, Cuff Size: Adult Regular)   Pulse 60   Temp 97.4  F (36.3  C) (Oral)   Ht 1.753 m (5' 9\")   Wt 76.3 kg (168 lb 3.2 oz)   SpO2 98%   BMI 24.84 kg/m    Body mass index is 24.84 kg/m .     Physical Exam     GENERAL: healthy, alert and no distress  RESP: lungs clear to auscultation - no rales, no rhonchi, no wheezes  CV: regular rates and rhythm, normal S1 S2, no S3 or S4 and no murmur, no click or rub   MS: extremities- no gross deformities noted,mild pitting edema bilat lower ext w/varicose veins bilat  Dorsal aspect of left foot with small wound (about size of pencil eraser) over varicose vein, no active bleeding, non-tender, no surrounding induration or swelling   SKIN: no suspicious lesions, no rashes        "

## 2023-05-03 NOTE — TELEPHONE ENCOUNTER
Patient wife's Usha, reports pt has had moderate bleeding from left foot. He had a callus that broke of and started bleeding 7 days ago. He had two episodes of moderate bleeding first time 1/2 a cup of blood and second time 1/4. He takes Xarelto. Pt denies weakness or dizziness. OV was scheduled for evaluation of foot.     Reason for Disposition    Scrape or abrasion    [1] Minor abrasion (scrape) or scab AND [2] from self-injury (e.g., cutting, picking; self-harm) AND [3] stable (i.e., not suicidal, not out of control)    Additional Information    Negative: [1] Major bleeding (e.g., actively dripping or spurting) AND [2] can't be stopped    Negative: Sounds like a life-threatening emergency to the triager    Negative: [1] Major bleeding (e.g., actively dripping or spurting) AND [2] can't be stopped    Negative: Sounds like a life-threatening emergency to the triager    Negative: [1] Bleeding AND [2] won't stop after 10 minutes of direct pressure (using correct technique)    Negative: Skin is split open or gaping (or length > 1/2 inch or 12 mm on the skin, 1/4 inch or 6 mm on the face)    Negative: Skin loss goes very deep (e.g., can see bones or tendons)    Negative: Skin loss involves more than 10% of surface area (Note: the palm of the hand = 1%)    Negative: [1] Dirt in the wound AND [2] not removed with 15 minutes of scrubbing    Negative: Sounds like a serious injury to the triager    Negative: [1] SEVERE pain AND [2] not improved 2 hours after pain medicine    Negative: [1] Looks infected AND [2] large red area (> 2 inches or 5 cm) or streak    Negative: [1] Fever AND [2] spreading red area or streak    Negative: Suspicious history for the injury    Negative: [1] Looks infected (spreading redness, pus) AND [2] no fever    Negative: No prior tetanus shots (or is not fully vaccinated)    Protocols used: SKIN TEAR-A-AH, BVMWELS-B-CG

## 2023-05-03 NOTE — PATIENT INSTRUCTIONS
Change dressings daily or as needed    Keep dressed x 7-10 days then allow open to air    Wound care clinic referral

## 2023-05-03 NOTE — RESULT ENCOUNTER NOTE
Dear Salvador,     Here are your recent hemoglobin results which are stable.    Please let us know if you have any questions or concerns.    Regards,  Ana Keller PA-C

## 2023-05-04 ENCOUNTER — TELEPHONE (OUTPATIENT)
Dept: WOUND CARE | Facility: CLINIC | Age: 88
End: 2023-05-04
Payer: COMMERCIAL

## 2023-05-04 NOTE — TELEPHONE ENCOUNTER
Consult received via Workqueue from Ana Arechiga PA-C in  [facility] for wound of the left foot.    Please schedule with Dr. Grullon at [facility] [place] for next available appointment.    **If scheduling with Amna LOYOLA or Dr. Grullon please schedule a follow up 2-3 weeks after initial appointment.    Is the patient able to make their own medical decisions? yes    Is patient a BONILLA lift? PLEASE INQUIRE WHEN MAKING THE APPOINTMENT AND PUT IN APPOINTMENT NOTES    Routing to  Wound Healing Scheduling.

## 2023-05-06 ENCOUNTER — OFFICE VISIT (OUTPATIENT)
Dept: URGENT CARE | Facility: URGENT CARE | Age: 88
End: 2023-05-06
Payer: COMMERCIAL

## 2023-05-06 VITALS
DIASTOLIC BLOOD PRESSURE: 83 MMHG | HEART RATE: 64 BPM | BODY MASS INDEX: 24.51 KG/M2 | WEIGHT: 166 LBS | OXYGEN SATURATION: 100 % | RESPIRATION RATE: 16 BRPM | SYSTOLIC BLOOD PRESSURE: 142 MMHG | TEMPERATURE: 97.1 F

## 2023-05-06 DIAGNOSIS — S95.212A: Primary | ICD-10-CM

## 2023-05-06 PROCEDURE — 12001 RPR S/N/AX/GEN/TRNK 2.5CM/<: CPT | Performed by: INTERNAL MEDICINE

## 2023-05-06 PROCEDURE — 99213 OFFICE O/P EST LOW 20 MIN: CPT | Mod: 25 | Performed by: INTERNAL MEDICINE

## 2023-05-06 RX ORDER — FERROUS SULFATE 325(65) MG
325 TABLET ORAL
COMMUNITY

## 2023-05-06 NOTE — PATIENT INSTRUCTIONS
Stop Xarelto for the next 10 days.  Resume Xarelto at your normal dosing on 5/16/2023.    Dressing:  Change dressing at least once daily.  More often if needed.  Apply a layer of bacitracin ointment to non-adherent pad (Telfa) and then apply this to the wound.  Cover with several layers of 4x4 gauze.  Secure snug with a Coban wrap.  Check the toes to make sure that the Coban wrap is not so tight that it disrupts blood flow.  When you pinch a toe you should see the skin taina and then when you release pressure should see a pink blush of blood flowing back into the toe within several seconds.  This tells you that arterial blood flow is not constricted.    Keep the foot dry for the next week.  After one week, gentle cleansing in warm soapy water is appropriate.

## 2023-06-02 ENCOUNTER — HOSPITAL ENCOUNTER (OUTPATIENT)
Dept: WOUND CARE | Facility: CLINIC | Age: 88
Discharge: HOME | End: 2023-06-02
Attending: PODIATRIST | Admitting: PODIATRIST
Payer: COMMERCIAL

## 2023-06-02 VITALS
SYSTOLIC BLOOD PRESSURE: 137 MMHG | HEART RATE: 57 BPM | TEMPERATURE: 96.8 F | RESPIRATION RATE: 20 BRPM | WEIGHT: 165 LBS | HEIGHT: 66 IN | BODY MASS INDEX: 26.52 KG/M2 | DIASTOLIC BLOOD PRESSURE: 86 MMHG

## 2023-06-02 DIAGNOSIS — S91.302A OPEN WOUND OF LEFT FOOT, INITIAL ENCOUNTER: ICD-10-CM

## 2023-06-02 DIAGNOSIS — T14.90XA CLOSED WOUND: ICD-10-CM

## 2023-06-02 PROCEDURE — 99203 OFFICE O/P NEW LOW 30 MIN: CPT | Performed by: PODIATRIST

## 2023-06-02 PROCEDURE — G0463 HOSPITAL OUTPT CLINIC VISIT: HCPCS

## 2023-06-02 NOTE — PROGRESS NOTES
Patient arrived for wound care visit. Wound Care Nurse time spent evaluating patient record, completed a full evaluation and documented wound(s) & maria antonia-wound skin; provided recommendation based on treatment plan. Reviewed discharge instructions, patient education, and discussed plan of care with appropriate medical team staff members and patient and/or family members.

## 2023-06-02 NOTE — PROGRESS NOTES
[facility] [place] Progress Note    Subject: Patient was seen at wound center for left foot. Per patient and his wife, he had an injury several weeks ago, causing a significant amount of bleeding due to patient's xarelto use. They went to the urgent care on 5/6 and had the site sutured at that time. States since then, site has been improving and now appears to be dry. Patient's wife states they've continued placing a bandage on the site in order to monitor for any drainage. He denies pain to the site. Denies N/F/V/C/D. Takes xarelto due to previous history of DVTs.     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     Prostate cancer (H) 2008    had surgery, D.r Michelle     Skin cancer, basal cell 2002, 2008    had xrt 2002, mohs surgery 2008     Thrombophlebitis 1985       Social Hx:   Social History     Socioeconomic History     Marital status:      Spouse name: Not on file     Number of children: 3     Years of education: Not on file     Highest education level: Not on file    Occupational History     Occupation: computer [employer]     Employer: RETIRED   Tobacco Use     Smoking status: Never     Smokeless tobacco: Never   Vaping Use     Vaping status: Not on file   Substance and Sexual Activity     Alcohol use: Yes     Alcohol/week: 0.0 standard drinks of alcohol     Comment: occasional     Drug use: No     Sexual activity: Not Currently     Partners: Male   Other Topics Concern     Parent/sibling w/ CABG, MI or angioplasty before 65F 55M? No   Social History Narrative     Not on file     Social Determinants of Health     Financial Resource Strain: Not on file   Food Insecurity: Not on file   Transportation Needs: Not on file   Physical Activity: Not on file   Stress: Not on file   Social Connections: Not on file   Intimate Partner Violence: Not on file   Housing Stability: Not on file       Surgical Hx:   Past Surgical History:   Procedure Laterality Date     CIRCUMCISION  3/12/2013    Procedure: CIRCUMCISION;  CIRCUMCISION;  Surgeon: Stevenson Martinez MD;  Location: [facility]  2011; 2019    Dr. Wills; [facility]     GENITOURINARY SURGERY      prostatectomy     HERNIA REPAIR  1961    Hernia     LASER HOLMIUM LITHOTRIPSY URETER(S), INSERT STENT, COMBINED Right 6/13/2021    Procedure: Cystoscopy, right ureter stent placement;  Surgeon: Rishi Vincent MD;  Location:  OR     LASER HOLMIUM LITHOTRIPSY URETER(S), INSERT STENT, COMBINED Right 7/2/2021    Procedure: CYSTOSCOPY, RIGHT URETEROSCOPY, HOLMIUM LASER LITHOTRIPSY, RIGHT URETERAL STENT EXCHANGE;  Surgeon: Stevenson Martinez MD;  Location: [facility] OR mohs surgery  2008, 2011    basal cell     VASCULAR SURGERY  1985    vein stripping both legs some     [facility] NONSPECIFIC PROCEDURE  13, 36    HERNIORRHAPHY     [facility] NONSPECIFIC PROCEDURE  1985    VEIN STRIPPING       Allergies:    Allergies   Allergen Reactions     Cat Hair [Cats]        Medications:   Current Outpatient Medications   Medication     ascorbic acid 1000 MG  "TABS tablet     Cholecalciferol (VITAMIN D PO)     dorzolamide-timolol PF (COSOPT) 22.3-6.8 MG/ML opthalmic solution     ferrous sulfate (FEROSUL) 325 (65 Fe) MG tablet     hydrochlorothiazide (HYDRODIURIL) 12.5 MG tablet     latanoprost (XALATAN) 0.005 % ophthalmic solution     Magnesium 500 MG CAPS     MULTIPLE VITAMIN PO     rivaroxaban ANTICOAGULANT (XARELTO ANTICOAGULANT) 20 MG TABS tablet     triamcinolone (KENALOG) 0.1 % external cream     vitamin B complex with vitamin C (STRESS TAB) tablet     zinc gluconate 50 MG tablet     No current facility-administered medications for this encounter.         Objective:  Vitals:  /86   Pulse 57   Temp 96.8  F (36  C) (Temporal)   Resp 20   Ht 1.676 m (5' 6\")   Wt 74.8 kg (165 lb)   BMI 26.63 kg/m      A1C: None    General:  Patient is alert and orientated.  NAD     Dermatologic: Left foot dorsal site with slight dry skin. No open lesions. Some areas of dermatitis and venous congestion. Minimal noted swelling. No cellulitis.     .   Wound (used by OP [facility] only) 06/02/23 1259 Left dorsal foot unspecified (Active)   Base closed/resurfaced 06/02/23 1315   Length (cm) 0 06/02/23 1315   Width (cm) 0 06/02/23 1315   Depth (cm) 0 06/02/23 1315   Wound (cm^2) 0 cm^2 06/02/23 1315   Wound Volume (cm^3) 0 cm^3 06/02/23 1315   Wound healing % 100 06/02/23 1315   Drainage Characteristics/Odor serosanguineous 06/02/23 1200   Drainage Amount none 06/02/23 1315       Incision/Surgical Site 03/12/13 Penis (Active)          Vascular: DP & PT pulses are intact & regular bilaterally.  No significant edema or varicosities noted.  CFT and skin temperature is normal to both lower extremities.     Neurologic: Lower extremity sensation is intact to light touch.  No evidence of weakness or contracture in the lower extremities.  No evidence of neuropathy.     Musculoskeletal: Patient is ambulatory without assistive device or brace.  No gross ankle deformity noted.  No foot or ankle " joint effusion is noted.    Imaging:  None per lower extremity     Cultures:  None     Assessment:   1. Previous left foot ulcer, now well healed.   2. Venous insufficiency     Plan:    -Discussed all findings with patient. Chart and imaging reviewed.   -Foot ulcer is now closed and healed. Some noted macerated, xerotic tissue, but following removal, no open lesions.   -Ongoing discussion had regarding need to wear compression to prevent further swelling or any further tissue breakdown. Discussed with patient and wife at length.   -No further bandages needed to left foot. Okay to shower and dry as normal.   -Discharged from wound care. Follow up as needed       Carri Grullon DPM                  Further instructions from your care team       Jose TANA Ray      1935    A DME order was not completed because supplies were not needed    Wound Dressing Change: Left Dorsal Foot  - Wash your hands with soap and water before you begin   - There is no need to cover the area  - Please resume wearing your compression socks    Wound Clinic follow up in the future if there are any wound concerns     STEVEN Todd.P.CITLALI. June 2, 2023    Call us at [phone] if you have any questions about your wounds, have redness or swelling around your wound, have a fever of 101 degrees Fahrenheit or greater or if you have any other problems or concerns. We answer the phone Monday through Friday 8 am to 4 pm, please leave a message as we check the voicemail frequently throughout the day.     If you had a positive experience please indicate that on your patient satisfaction survey form that [facility] will be sending you.    It was a pleasure meeting with you today.  Thank you for allowing me and my team the privilege of caring for you today.  YOU are the reason we are here, and I truly hope we provided you with the excellent service you deserve.  Please let us know if there is anything else we can do for you so that  we can be sure you are leaving completely satisfied with your care experience.      If you have any billing related questions please call the [facility] Business office at [phone]. The clinic staff does not handle billing related matters.    If you are scheduled to have a follow up appointment, you will receive a reminder call the day before your visit. On the appointment day please arrive 15 minutes prior to your appointment time. If you are unable to keep that appointment, please call the clinic to cancel or reschedule. If you are more than 10 minutes late or greater for your scheduled appointment time, the clinic policy is that you may be asked to reschedule.

## 2023-06-02 NOTE — DISCHARGE INSTRUCTIONS
Jose Ray      1935    A DME order was not completed because supplies were not needed    Wound Dressing Change: Left Dorsal Foot  - Wash your hands with soap and water before you begin   - There is no need to cover the area  - Please resume wearing your compression socks    Wound Clinic follow up in the future if there are any wound concerns     Carri Grullon D.P.M. June 2, 2023    Call us at [phone] if you have any questions about your wounds, have redness or swelling around your wound, have a fever of 101 degrees Fahrenheit or greater or if you have any other problems or concerns. We answer the phone Monday through Friday 8 am to 4 pm, please leave a message as we check the voicemail frequently throughout the day.     If you had a positive experience please indicate that on your patient satisfaction survey form that [facility] will be sending you.    It was a pleasure meeting with you today.  Thank you for allowing me and my team the privilege of caring for you today.  YOU are the reason we are here, and I truly hope we provided you with the excellent service you deserve.  Please let us know if there is anything else we can do for you so that we can be sure you are leaving completely satisfied with your care experience.      If you have any billing related questions please call the [facility] Business office at [phone]. The clinic staff does not handle billing related matters.    If you are scheduled to have a follow up appointment, you will receive a reminder call the day before your visit. On the appointment day please arrive 15 minutes prior to your appointment time. If you are unable to keep that appointment, please call the clinic to cancel or reschedule. If you are more than 10 minutes late or greater for your scheduled appointment time, the clinic policy is that you may be asked to reschedule.

## 2023-08-02 ENCOUNTER — TRANSFERRED RECORDS (OUTPATIENT)
Dept: HEALTH INFORMATION MANAGEMENT | Facility: CLINIC | Age: 88
End: 2023-08-02
Payer: COMMERCIAL

## 2023-08-02 ENCOUNTER — TELEPHONE (OUTPATIENT)
Dept: FAMILY MEDICINE | Facility: CLINIC | Age: 88
End: 2023-08-02

## 2023-08-02 NOTE — TELEPHONE ENCOUNTER
Attempt#: 1    Called Patient at Phone#: [phone]     Left voicemail for patient to callback regarding: scheduling AWE with PCP Dr. Lockett. There is an appointment for a Preventative exam scheduled with another provider on 9/8/2023 so need to cancel that if pt is scheduled with PCP.

## 2023-08-02 NOTE — TELEPHONE ENCOUNTER
If urgent then can do work in, if just complete physical exam then not able to work in, if needed can use virtual spot for inperson so not as long    Hosea Lockett M.D.

## 2023-08-02 NOTE — TELEPHONE ENCOUNTER
Reason for Call:  Appointment Request    Patient requesting this type of appt:  Preventive     Requested provider: Hosea Lockett    Reason patient unable to be scheduled: Not within requested timeframe    When does patient want to be seen/preferred time: 1-2 weeks    Comments: preventative visit    Could we send this information to you in [place] or would you prefer to receive a phone call?:   Patient would prefer a phone call   Okay to leave a detailed message?: Yes at Cell number on file:    Telephone Information:   Mobile [phone]       Call taken on 8/2/2023 at 3:07 PM by MARIANELA NEWMAN

## 2023-08-04 NOTE — TELEPHONE ENCOUNTER
Patient scheduled with Dr Lockett 8- to evaluate weakness/fatigue  Appt at [facility] for wellness px on 9-8-2023 is cancelled.  Janet Reeves MA

## 2023-08-13 ENCOUNTER — OFFICE VISIT (OUTPATIENT)
Dept: URGENT CARE | Facility: URGENT CARE | Age: 88
End: 2023-08-13
Payer: COMMERCIAL

## 2023-08-13 VITALS
TEMPERATURE: 97.8 F | SYSTOLIC BLOOD PRESSURE: 120 MMHG | BODY MASS INDEX: 26.63 KG/M2 | HEART RATE: 66 BPM | OXYGEN SATURATION: 98 % | WEIGHT: 165 LBS | RESPIRATION RATE: 20 BRPM | DIASTOLIC BLOOD PRESSURE: 75 MMHG

## 2023-08-13 DIAGNOSIS — T78.40XA ALLERGIC REACTION TO DRUG, INITIAL ENCOUNTER: Primary | ICD-10-CM

## 2023-08-13 PROCEDURE — 99214 OFFICE O/P EST MOD 30 MIN: CPT | Performed by: FAMILY MEDICINE

## 2023-08-13 RX ORDER — METHYLPREDNISOLONE 4 MG
TABLET, DOSE PACK ORAL
Qty: 21 TABLET | Refills: 0 | Status: SHIPPED | OUTPATIENT
Start: 2023-08-13 | End: 2023-08-24

## 2023-08-13 NOTE — PROGRESS NOTES
SUBJECTIVE:Jose Ray is a 88 year old male who presents to the clinic today for a rash.  Onset of rash was day(s) ago.   Rash is still present.   Location of the rash: generalized.  Associated symptoms include: itching.    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, hosp fsd    DVT, lower extremity, distal, acute, right (H) 02/2020, 1996    gastrocnemius vein in 2020, not sure which vein in 1996, factor 5 leiden neg 2020; lifelong therapy    Eczema     Gallstone 2010    seen on ct for renal stone    Generalized osteoarthrosis, unspecified site     HTN (hypertension) 06/2014    us done and no hydro but renal cyst, had edema with norvasc 5mg, added hctz and stopped norvasc 11/20    Hx of colonoscopy 2011    nl    Hydronephrosis of left kidney 2010    seen on above ct    Nephrolithiasis 2010    seen on ct, hydronephrosis seen as well; had richy and hydro 6/21, stenting done.    Prostate cancer (H) 2008    had surgery, D.r Michelle    Skin cancer, basal cell 2002, 2008    had xrt 2002, mohs surgery 2008    Thrombophlebitis 1985     Allergies   Allergen Reactions    Cat Hair [Cats]      Social History     Tobacco Use    Smoking status: Never    Smokeless tobacco: Never   Substance Use Topics    Alcohol use: Yes     Alcohol/week: 0.0 standard drinks of alcohol     Comment: occasional       ROS:CONSTITUTIONAL:NEGATIVE for fever, chills, change in weight    EXAM: VITALS: /75   Pulse 66   Temp 97.8  F (36.6  C)   Resp 20   Wt 74.8 kg (165 lb)   SpO2 98%   BMI 26.63 kg/m    General:healthy,alert,no distress    Location: generalized     Distribution: diffuse/patchy     Lesion grouping:  bilateral     Lesion type: macular     Color: red with no other findingsPERTINENT EXAM: GENERAL APPEARANCE: healthy, alert and no distress      ICD-10-CM    1. Allergic reaction to drug, initial encounter  T78.40XA methylPREDNISolone (MEDROL DOSEPAK) 4 MG tablet therapy pack        Otc anti histamine   Follow-up with primary clinic if not improving

## 2023-08-24 ENCOUNTER — OFFICE VISIT (OUTPATIENT)
Dept: FAMILY MEDICINE | Facility: CLINIC | Age: 88
End: 2023-08-24
Payer: COMMERCIAL

## 2023-08-24 VITALS
RESPIRATION RATE: 16 BRPM | SYSTOLIC BLOOD PRESSURE: 136 MMHG | HEIGHT: 66 IN | BODY MASS INDEX: 26.68 KG/M2 | HEART RATE: 53 BPM | TEMPERATURE: 97.7 F | DIASTOLIC BLOOD PRESSURE: 82 MMHG | WEIGHT: 166 LBS

## 2023-08-24 DIAGNOSIS — Z00.00 MEDICARE ANNUAL WELLNESS VISIT, SUBSEQUENT: Primary | ICD-10-CM

## 2023-08-24 DIAGNOSIS — E78.5 HYPERLIPIDEMIA LDL GOAL <160: ICD-10-CM

## 2023-08-24 DIAGNOSIS — I10 BENIGN ESSENTIAL HYPERTENSION: ICD-10-CM

## 2023-08-24 DIAGNOSIS — D12.6 ADENOMATOUS POLYP OF COLON, UNSPECIFIED PART OF COLON: ICD-10-CM

## 2023-08-24 DIAGNOSIS — I82.4Z1 DVT, LOWER EXTREMITY, DISTAL, ACUTE, RIGHT (H): ICD-10-CM

## 2023-08-24 DIAGNOSIS — N18.31 STAGE 3A CHRONIC KIDNEY DISEASE (H): ICD-10-CM

## 2023-08-24 DIAGNOSIS — C61 PROSTATE CANCER (H): ICD-10-CM

## 2023-08-24 DIAGNOSIS — R53.83 TIREDNESS: ICD-10-CM

## 2023-08-24 PROBLEM — R79.89 ELEVATED SERUM CREATININE: Status: RESOLVED | Noted: 2021-06-13 | Resolved: 2023-08-24

## 2023-08-24 LAB
ALBUMIN SERPL BCG-MCNC: 4.2 G/DL (ref 3.5–5.2)
ALP SERPL-CCNC: 53 U/L (ref 40–129)
ALT SERPL W P-5'-P-CCNC: 19 U/L (ref 0–70)
ANION GAP SERPL CALCULATED.3IONS-SCNC: 10 MMOL/L (ref 7–15)
AST SERPL W P-5'-P-CCNC: 21 U/L (ref 0–45)
BILIRUB SERPL-MCNC: 0.6 MG/DL
BUN SERPL-MCNC: 36.3 MG/DL (ref 8–23)
CALCIUM SERPL-MCNC: 9 MG/DL (ref 8.8–10.2)
CHLORIDE SERPL-SCNC: 104 MMOL/L (ref 98–107)
CHOLEST SERPL-MCNC: 197 MG/DL
CREAT SERPL-MCNC: 1.2 MG/DL (ref 0.67–1.17)
DEPRECATED HCO3 PLAS-SCNC: 24 MMOL/L (ref 22–29)
ERYTHROCYTE [DISTWIDTH] IN BLOOD BY AUTOMATED COUNT: 12.6 % (ref 10–15)
GFR SERPL CREATININE-BSD FRML MDRD: 59 ML/MIN/1.73M2
GLUCOSE SERPL-MCNC: 103 MG/DL (ref 70–99)
HCT VFR BLD AUTO: 44.6 % (ref 40–53)
HDLC SERPL-MCNC: 112 MG/DL
HGB BLD-MCNC: 14.4 G/DL (ref 13.3–17.7)
LDLC SERPL CALC-MCNC: 67 MG/DL
MCH RBC QN AUTO: 32 PG (ref 26.5–33)
MCHC RBC AUTO-ENTMCNC: 32.3 G/DL (ref 31.5–36.5)
MCV RBC AUTO: 99 FL (ref 78–100)
NONHDLC SERPL-MCNC: 85 MG/DL
PLATELET # BLD AUTO: 307 10E3/UL (ref 150–450)
POTASSIUM SERPL-SCNC: 4.2 MMOL/L (ref 3.4–5.3)
PROT SERPL-MCNC: 6.6 G/DL (ref 6.4–8.3)
PSA SERPL DL<=0.01 NG/ML-MCNC: 0.09 NG/ML
RBC # BLD AUTO: 4.5 10E6/UL (ref 4.4–5.9)
SODIUM SERPL-SCNC: 138 MMOL/L (ref 136–145)
TRIGL SERPL-MCNC: 90 MG/DL
TSH SERPL DL<=0.005 MIU/L-ACNC: 0.98 UIU/ML (ref 0.3–4.2)
WBC # BLD AUTO: 11 10E3/UL (ref 4–11)

## 2023-08-24 PROCEDURE — 80061 LIPID PANEL: CPT | Performed by: INTERNAL MEDICINE

## 2023-08-24 PROCEDURE — 84153 ASSAY OF PSA TOTAL: CPT | Performed by: INTERNAL MEDICINE

## 2023-08-24 PROCEDURE — 84443 ASSAY THYROID STIM HORMONE: CPT | Performed by: INTERNAL MEDICINE

## 2023-08-24 PROCEDURE — 85027 COMPLETE CBC AUTOMATED: CPT | Performed by: INTERNAL MEDICINE

## 2023-08-24 PROCEDURE — G0439 PPPS, SUBSEQ VISIT: HCPCS | Performed by: INTERNAL MEDICINE

## 2023-08-24 PROCEDURE — 36415 COLL VENOUS BLD VENIPUNCTURE: CPT | Performed by: INTERNAL MEDICINE

## 2023-08-24 PROCEDURE — 80053 COMPREHEN METABOLIC PANEL: CPT | Performed by: INTERNAL MEDICINE

## 2023-08-24 RX ORDER — HYDROCHLOROTHIAZIDE 12.5 MG/1
12.5 TABLET ORAL DAILY
Qty: 90 TABLET | Refills: 3 | Status: SHIPPED | OUTPATIENT
Start: 2023-08-24 | End: 2024-05-31

## 2023-08-24 ASSESSMENT — ENCOUNTER SYMPTOMS: FATIGUE: 1

## 2023-08-24 ASSESSMENT — ACTIVITIES OF DAILY LIVING (ADL): CURRENT_FUNCTION: NO ASSISTANCE NEEDED

## 2023-08-24 ASSESSMENT — PAIN SCALES - GENERAL: PAINLEVEL: NO PAIN (0)

## 2023-08-24 NOTE — PROGRESS NOTES
SUBJECTIVE:   Salvador is a 88 year old who presents for Preventive Visit.    He presents with his wife and overall is doing quite well.  He does note that with walking he can get tired but does not have chest pain or shortness of breath.  His right hip can hurt at times with walking but no claudication.  He otherwise is doing well and without complaints.  He is not exercising much.               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, hosp fsd     DVT, lower extremity, distal, acute, right (H) 02/2020, 1996    gastrocnemius vein in 2020, not sure which vein in 1996, factor 5 leiden neg 2020; lifelong therapy     Eczema      Gallstone 2010    seen on ct for renal stone     Generalized osteoarthrosis, unspecified site      HTN (hypertension) 06/2014    us done and no hydro but renal cyst, had edema with norvasc 5mg, added hctz and stopped norvasc 11/20     Hx of colonoscopy 2011    nl     Hydronephrosis of left kidney 2010    seen on above ct     Nephrolithiasis 2010    seen on ct, hydronephrosis seen as well; had richy and hydro 6/21, stenting done.     Prostate cancer (H) 2008    had surgery, Zohra Martinez     Skin cancer, basal cell 2002, 2008    had xrt 2002, mohs surgery 2008     Thrombophlebitis 1985             Past Surgical History:      Past Surgical History:   Procedure Laterality Date     CIRCUMCISION  3/12/2013    Procedure: CIRCUMCISION;  CIRCUMCISION;  Surgeon: Stevenson Martinez MD;  Location: [facility]     [facility]  2011[facility]     GENITOURINARY SURGERY      prostatectomy     HERNIA REPAIR  1961    Hernia     LASER HOLMIUM LITHOTRIPSY URETER(S), INSERT STENT, COMBINED  Right 6/13/2021    Procedure: Cystoscopy, right ureter stent placement;  Surgeon: Rishi Vincent MD;  Location:  OR     LASER HOLMIUM LITHOTRIPSY URETER(S), INSERT STENT, COMBINED Right 7/2/2021    Procedure: CYSTOSCOPY, RIGHT URETEROSCOPY, HOLMIUM LASER LITHOTRIPSY, RIGHT URETERAL STENT EXCHANGE;  Surgeon: Stevenson Martinez MD;  Location:  OR     mohs surgery  2008, 2011    basal cell     VASCULAR SURGERY  1985    vein stripping both legs some     [facility] NONSPECIFIC PROCEDURE  13, 35    HERNIORRHAPHY     [facility] NONSPECIFIC PROCEDURE  1985    VEIN STRIPPING             Social History:     Social History     Socioeconomic History     Marital status:      Spouse name: Not on file     Number of children: 3     Years of education: Not on file     Highest education level: Not on file   Occupational History     Occupation: [employer]     Employer: RETIRED   Tobacco Use     Smoking status: Never     Smokeless tobacco: Never   Substance and Sexual Activity     Alcohol use: Yes     Alcohol/week: 0.0 standard drinks of alcohol     Comment: occasional     Drug use: No     Sexual activity: Not Currently     Partners: Male   Other Topics Concern     Parent/sibling w/ CABG, MI or angioplasty before 65F 55M? No   Social History Narrative     Not on file     Social Determinants of Health     Financial Resource Strain: Not on file   Food Insecurity: Not on file   Transportation Needs: Not on file   Physical Activity: Not on file   Stress: Not on file   Social Connections: Not on file   Intimate Partner Violence: Not on file   Housing Stability: Not on file             Family History:   reviewed         Allergies:     Allergies   Allergen Reactions     Cat Hair [Cats]      Amoxicillin Rash             Medications:     Current Outpatient Medications   Medication Sig Dispense Refill     ascorbic acid 1000 MG TABS tablet Take 1,000 mg by mouth 2 times daily       Cholecalciferol (VITAMIN D PO) Take 1,000 Units by  "mouth daily        dorzolamide-timolol PF (COSOPT) 22.3-6.8 MG/ML opthalmic solution Place 1 drop into both eyes 2 times daily       ferrous sulfate (FEROSUL) 325 (65 Fe) MG tablet Take 325 mg by mouth daily (with breakfast)       hydrochlorothiazide (HYDRODIURIL) 12.5 MG tablet Take 1 tablet (12.5 mg) by mouth daily 90 tablet 3     latanoprost (XALATAN) 0.005 % ophthalmic solution Place 1 drop into both eyes daily        Magnesium 500 MG CAPS        MULTIPLE VITAMIN PO Take 1 tablet by mouth every morning       rivaroxaban ANTICOAGULANT (XARELTO ANTICOAGULANT) 20 MG TABS tablet TAKE 1 TABLET (20 MG) BY MOUTH DAILY (WITH DINNER) 90 tablet 3     triamcinolone (KENALOG) 0.1 % external cream Apply topically daily as needed for irritation (Back)        vitamin B complex with vitamin C (STRESS TAB) tablet Take 1 tablet by mouth daily       zinc gluconate 50 MG tablet Take 50 mg by mouth daily                 Review of Systems:     The 10 point Review of Systems is negative other than noted in the HPI           Physical Exam:   Blood pressure 136/82, pulse 53, temperature 97.7  F (36.5  C), temperature source Temporal, resp. rate 16, height 1.676 m (5' 6\"), weight 75.3 kg (166 lb).    Exam:  Constitutional: healthy appearing, alert and in no distress  Heent: Normocephalic. Head without obvious masses or lesions. PERRLDC, EOMI. Mouth exam within normal limits: tongue, mucous membranes, posterior pharynx all normal, no lesions or abnormalities seen.  Tm's and canals within normal limits bilaterally. Neck supple, no nuchal rigidity or masses. No supraclavicular, or cervical adenopathy. Thyroid symmetric, no masses.  Cardiovascular: Regular rate and rhythm, no murmer, rub or gallops.  JVP not elevated, no edema.  Carotids within normal limits bilaterally, no bruits.  Respiratory: Normal respiratory effort.  Lungs clear, normal flow, no wheezing or crackles.  Breasts: Normal bilaterally.  No masses or lesions.  Nipples within " "normal limites.  No axillary lesions or nodes.  Gastrointestinal: Normal active bowel sounds.   Soft, not tender, no masses, guarding or rebound.  No hepatosplenomegaly.   Genitourinary: Rectal not done  Musculoskeletal: extremities normal, no gross deformities noted.  Skin: no suspicious lesions or rashes   Neurologic: Mental status within normal limits.  Speech fluent.  No gross motor abnormalities and gait intact.  Psychiatric: mentation appears normal and affect normal.         Data:   Labs sent        Assessment:   Normal complete physical exam  CKD, follow-up labs  DVT, on Xarelto  Prostate cancer, no evidence of disease  Colon polyp, no follow-up indicated  Hypertension, controlled  Hyperlipidemia, follow-up labs  Fatigue, some of this may be that he stays up late  Healthcare maintenance         Plan:   COVID booster in the fall at pharmacy  Monitor with labs  Exercise and diet  Call if changes      Hosea Lockett M.D.             No data to display                Are you in the first 12 months of your Medicare coverage?  No    History of Present Illness       Reason for visit:  Weakness,    He eats 4 or more servings of fruits and vegetables daily.He consumes 2 sweetened beverage(s) daily.He exercises with enough effort to increase his heart rate 9 or less minutes per day.  He exercises with enough effort to increase his heart rate 3 or less days per week.   He is taking medications regularly.  He is not taking prescribed medications regularly due to None.  Healthy Habits:     In general, how would you rate your overall health?  Very good    Frequency of exercise:  2-3 days/week    Duration of exercise:  Less than 15 minutes    Do you usually eat at least 4 servings of fruit and vegetables a day, include whole grains    & fiber and avoid regularly eating high fat or \"junk\" foods?  Yes    Taking medications regularly:  0    Barriers to taking medications:  None    Medication side effects:  None    Ability to " successfully perform activities of daily living:  No assistance needed    Home Safety:  No safety concerns identified    Hearing Impairment:  No hearing concerns    In the past 6 months, have you been bothered by leaking of urine?  No    In general, how would you rate your overall mental or emotional health?  Very good    Additional concerns today:  No        Have you ever done Advance Care Planning? (For example, a Health Directive, POLST, or a discussion with a medical provider or your loved ones about your wishes): Yes, advance care planning is on file.  Hearing Assessment:    Fall risk  Fallen 2 or more times in the past year?: No  Any fall with injury in the past year?: No    Cognitive Screening   1) Repeat 3 items (Leader, Season, Table)    2) Clock draw: ABNORMAL l  3) 3 item recall: Recalls 3 objects  Results: 3 items recalled: COGNITIVE IMPAIRMENT LESS LIKELY    Mini-CogTM Copyright TANA Mccullough. Licensed by the author for use in [facility]; reprinted with permission (soob@[facility]). All rights reserved.      Do you have sleep apnea, excessive snoring or daytime drowsiness? : no    Reviewed and updated as needed this visit by clinical staff     Meds   Med Hx            Reviewed and updated as needed this visit by Provider     Meds   Med Hx           Social History     Tobacco Use     Smoking status: Never     Smokeless tobacco: Never   Substance Use Topics     Alcohol use: Yes     Alcohol/week: 0.0 standard drinks of alcohol     Comment: occasional             4/5/2019    11:53 PM   Alcohol Use   Prescreen: >3 drinks/day or >7 drinks/week? No          No data to display              Do you have a current opioid prescription? No  Do you use any other controlled substances or medications that are not prescribed by a provider? None            Current providers sharing in care for this patient include:   Patient Care Team:  Hosea Lockett MD as PCP - General (Internal Medicine)  Hosea Lockett  "MD Marcial as Assigned PCP    The following health maintenance items are reviewed in Epic and correct as of today:  Health Maintenance   Topic Date Due     MICROALBUMIN  Never done     ANNUAL REVIEW OF HM ORDERS  Never done     HEPATITIS A IMMUNIZATION (1 of 2 - Risk 2-dose series) 02/17/1954     HEPATITIS B IMMUNIZATION (1 of 3 - Risk 3-dose series) Never done     LIPID  04/08/2020     COVID-19 Vaccine (7 - Mixed Product series) 02/19/2023     INFLUENZA VACCINE (1) 09/01/2023     BMP  10/10/2023     ADVANCE CARE PLANNING  01/03/2024     HEMOGLOBIN  05/03/2024     MEDICARE ANNUAL WELLNESS VISIT  08/24/2024     FALL RISK ASSESSMENT  08/24/2024     DTAP/TDAP/TD IMMUNIZATION (4 - Td or Tdap) 07/23/2033     PHQ-2 (once per calendar year)  Completed     Pneumococcal Vaccine: 65+ Years  Completed     URINALYSIS  Completed     ZOSTER IMMUNIZATION  Completed     IPV IMMUNIZATION  Aged Out     MENINGITIS IMMUNIZATION  Aged Out               Review of Systems   Constitutional:  Positive for fatigue.         OBJECTIVE:   /82   Pulse 53   Temp 97.7  F (36.5  C) (Temporal)   Resp 16   Ht 1.676 m (5' 6\")   Wt 75.3 kg (166 lb)   BMI 26.79 kg/m   Estimated body mass index is 26.79 kg/m  as calculated from the following:    Height as of this encounter: 1.676 m (5' 6\").    Weight as of this encounter: 75.3 kg (166 lb).  Physical Exam          ASSESSMENT / PLAN:             COUNSELING:  Reviewed preventive health counseling, as reflected in patient instructions       Regular exercise       Healthy diet/nutrition      BMI:   Estimated body mass index is 26.79 kg/m  as calculated from the following:    Height as of this encounter: 1.676 m (5' 6\").    Weight as of this encounter: 75.3 kg (166 lb).         He reports that he has never smoked. He has never used smokeless tobacco.      Appropriate preventive services were discussed with this patient, including applicable screening as appropriate for cardiovascular disease, " diabetes, osteopenia/osteoporosis, and glaucoma.  As appropriate for age/gender, discussed screening for colorectal cancer, prostate cancer, breast cancer, and cervical cancer. Checklist reviewing preventive services available has been given to the patient.    Reviewed patients plan of care and provided an AVS. The Basic Care Plan (routine screening as documented in Health Maintenance) for Jose meets the Care Plan requirement. This Care Plan has been established and reviewed with the Patient and spouse.          Hosea Lockett MD  [facility]    Identified Health Risks:  I have reviewed Opioid Use Disorder and Substance Use Disorder risk factors and made any needed referrals.

## 2023-08-25 NOTE — RESULT ENCOUNTER NOTE
It was nice seeing you for your physical.  You should be able to view your test results.    Your CBC or blood count is normal with no signs of anemia or blood disorders.  Your chemistry panel shows no diabetes.  Your blood salts, kidney test, liver test, proteins, and thyroid test are all fine.  Your PSA is low at 0.09.    Your cholesterol is good at 197.  Your HDL or good cholesterol and LDL or bad cholesterol are super.    I am happy to bring you this excellent report.  Please let me know if you have questions.    Hosea Lockett M.D.

## 2024-01-25 ENCOUNTER — TELEPHONE (OUTPATIENT)
Dept: FAMILY MEDICINE | Facility: CLINIC | Age: 89
End: 2024-01-25
Payer: COMMERCIAL

## 2024-01-25 NOTE — TELEPHONE ENCOUNTER
Cecilia with [facility] called asking to have PT referral faxed to her office, as the pt showed up for an appt but they did not have the referral.   Faxed referral per her request.     Thank you,  Masha Simms RN

## 2024-05-13 ENCOUNTER — MYC MEDICAL ADVICE (OUTPATIENT)
Dept: FAMILY MEDICINE | Facility: CLINIC | Age: 89
End: 2024-05-13
Payer: COMMERCIAL

## 2024-05-14 ENCOUNTER — OFFICE VISIT (OUTPATIENT)
Dept: FAMILY MEDICINE | Facility: CLINIC | Age: 89
End: 2024-05-14
Payer: COMMERCIAL

## 2024-05-14 VITALS
WEIGHT: 165 LBS | RESPIRATION RATE: 16 BRPM | HEART RATE: 58 BPM | OXYGEN SATURATION: 98 % | SYSTOLIC BLOOD PRESSURE: 134 MMHG | HEIGHT: 66 IN | DIASTOLIC BLOOD PRESSURE: 80 MMHG | TEMPERATURE: 97.8 F | BODY MASS INDEX: 26.52 KG/M2

## 2024-05-14 DIAGNOSIS — I10 BENIGN ESSENTIAL HYPERTENSION: Primary | ICD-10-CM

## 2024-05-14 LAB
ERYTHROCYTE [DISTWIDTH] IN BLOOD BY AUTOMATED COUNT: 12.9 % (ref 10–15)
HCT VFR BLD AUTO: 41.8 % (ref 40–53)
HGB BLD-MCNC: 13.5 G/DL (ref 13.3–17.7)
MCH RBC QN AUTO: 32.5 PG (ref 26.5–33)
MCHC RBC AUTO-ENTMCNC: 32.3 G/DL (ref 31.5–36.5)
MCV RBC AUTO: 101 FL (ref 78–100)
PLATELET # BLD AUTO: 223 10E3/UL (ref 150–450)
RBC # BLD AUTO: 4.15 10E6/UL (ref 4.4–5.9)
WBC # BLD AUTO: 5.5 10E3/UL (ref 4–11)

## 2024-05-14 PROCEDURE — 85027 COMPLETE CBC AUTOMATED: CPT | Performed by: INTERNAL MEDICINE

## 2024-05-14 PROCEDURE — 99213 OFFICE O/P EST LOW 20 MIN: CPT | Performed by: INTERNAL MEDICINE

## 2024-05-14 PROCEDURE — 36415 COLL VENOUS BLD VENIPUNCTURE: CPT | Performed by: INTERNAL MEDICINE

## 2024-05-14 PROCEDURE — 80048 BASIC METABOLIC PNL TOTAL CA: CPT | Performed by: INTERNAL MEDICINE

## 2024-05-14 ASSESSMENT — PAIN SCALES - GENERAL: PAINLEVEL: NO PAIN (0)

## 2024-05-14 NOTE — TELEPHONE ENCOUNTER
Patient Contact    Attempt # 1    Was call answered?  No.  Left message on voicemail with information to call back.    Teresa TRUJILLO, Triage RN  [facility]

## 2024-05-14 NOTE — PROGRESS NOTES
This is an urgent work and with the patient and his wife present for evaluation of blood pressure change.  Last Friday the patient noted feeling faint and weak while doing moderate work around the house.  His blood pressure at that time was around 10 4-1 09 and later that evening was 97/58.  The next 2 days 115/64 and 110/60.  Today it was a bit higher.  The patient is on hydrochlorothiazide which she has stopped as of this episode with his last dose being Friday.  His diet has not changed.  He is felt completely fine prior to this and since then.  He has no cardiovascular, GI symptoms, no fevers, diet or weight changes.  He is not a smoker or drinker.  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, hosp fsd    DVT, lower extremity, distal, acute, right (H) 02/2020, 1996    gastrocnemius vein in 2020, not sure which vein in 1996, factor 5 leiden neg 2020; lifelong therapy    Eczema     Gallstone 2010    seen on ct for renal stone    Generalized osteoarthrosis, unspecified site     HTN (hypertension) 06/2014    us done and no hydro but renal cyst, had edema with norvasc 5mg, added hctz and stopped norvasc 11/20    Hx of colonoscopy 2011    nl    Hydronephrosis of left kidney 2010    seen on above ct    Nephrolithiasis 2010    seen on ct, hydronephrosis seen as well; had richy and hydro 6/21, stenting done.    Prostate cancer (H) 2008    had surgery, D.r Michelle    Skin cancer, basal cell 2002, 2008    had xrt 2002, mohs surgery 2008    Thrombophlebitis 1985     Past Surgical History:   Procedure Laterality Date    CIRCUMCISION  3/12/2013    Procedure: CIRCUMCISION;  CIRCUMCISION;  Surgeon: Stevenson Martinez  MD;  Location: [facility]    [facility]  2011; 2019    [facility]    GENITOURINARY SURGERY      prostatectomy    HERNIA REPAIR  1961    Hernia    LASER HOLMIUM LITHOTRIPSY URETER(S), INSERT STENT, COMBINED Right 6/13/2021    Procedure: Cystoscopy, right ureter stent placement;  Surgeon: Rishi Vincent MD;  Location:  OR    LASER HOLMIUM LITHOTRIPSY URETER(S), INSERT STENT, COMBINED Right 7/2/2021    Procedure: CYSTOSCOPY, RIGHT URETEROSCOPY, HOLMIUM LASER LITHOTRIPSY, RIGHT URETERAL STENT EXCHANGE;  Surgeon: Stevenson Martinez MD;  Location:  OR    mohs surgery  2008, 2011    basal cell    VASCULAR SURGERY  1985    vein stripping both legs some    Z NONSPECIFIC PROCEDURE  13, 35    HERNIORRHAPHY    [facility] NONSPECIFIC PROCEDURE  1985    VEIN STRIPPING     Social History     Socioeconomic History    Marital status:      Spouse name: Not on file    Number of children: 3    Years of education: Not on file    Highest education level: Not on file   Occupational History    Occupation: [employer]     Employer: RETIRED   Tobacco Use    Smoking status: Never    Smokeless tobacco: Never   Substance and Sexual Activity    Alcohol use: Yes     Alcohol/week: 0.0 standard drinks of alcohol     Comment: occasional    Drug use: No    Sexual activity: Not Currently     Partners: Male   Other Topics Concern    Parent/sibling w/ CABG, MI or angioplasty before 65F 55M? No   Social History Narrative    Not on file     Social Determinants of Health     Financial Resource Strain: Not on file   Food Insecurity: Not on file   Transportation Needs: Not on file   Physical Activity: Not on file   Stress: Not on file   Social Connections: Not on file   Interpersonal Safety: Not on file   Housing Stability: Not on file     Current Outpatient Medications   Medication Sig Dispense Refill    ascorbic acid 1000 MG TABS tablet Take 1,000 mg by mouth 2 times daily      Cholecalciferol (VITAMIN D PO) Take 1,000 Units by  "mouth daily       dorzolamide-timolol PF (COSOPT) 22.3-6.8 MG/ML opthalmic solution Place 1 drop into both eyes 2 times daily      ferrous sulfate (FEROSUL) 325 (65 Fe) MG tablet Take 325 mg by mouth daily (with breakfast)      hydrochlorothiazide (HYDRODIURIL) 12.5 MG tablet Take 1 tablet (12.5 mg) by mouth daily 90 tablet 3    latanoprost (XALATAN) 0.005 % ophthalmic solution Place 1 drop into both eyes daily       Magnesium 500 MG CAPS       MULTIPLE VITAMIN PO Take 1 tablet by mouth every morning      rivaroxaban ANTICOAGULANT (XARELTO ANTICOAGULANT) 20 MG TABS tablet TAKE 1 TABLET (20 MG) BY MOUTH DAILY (WITH DINNER) 90 tablet 3    triamcinolone (KENALOG) 0.1 % external cream Apply topically daily as needed for irritation (Back)       vitamin B complex with vitamin C (STRESS TAB) tablet Take 1 tablet by mouth daily      zinc gluconate 50 MG tablet Take 50 mg by mouth daily       Allergies   Allergen Reactions    Cat Hair [Cats]     Amoxicillin Rash     FAMILY HISTORY NOTED AND REVIEWED    REVIEW OF SYSTEMS: above    PHYSICAL EXAM    /80 (BP Location: Right arm, Patient Position: Sitting, Cuff Size: Adult Regular)   Pulse 58   Temp 97.8  F (36.6  C) (Temporal)   Resp 16   Ht 1.676 m (5' 6\")   Wt 74.8 kg (165 lb)   SpO2 98%   BMI 26.63 kg/m      Patient appears non toxic  I did blood pressure reading sitting, lying and standing and it was not significantly orthostatic  Lungs - clear, normal flow  Cardiovascular - regular rate and rhythm, no murmer, rub or gallop, no jvp or edema, carotids within normal limits, no bruits.  Abdomen - normal active bowel sounds, soft, non tender, no masses, guarding or rebound, no hepatosplenomegaly    Labs sent    ASSESSMENT:  Hypertension, recent episode of decreased blood pressure of unclear cause.  Evaluation today reveals no signs of sepsis, GI bleed, anemia, or other cause.  No signs of acute abdominal process    PLAN:  He will stay off the " hydrochlorothiazide  His wife was a nurse will check his blood pressure twice a day and call me if it goes under 90/170  He will call right away if he has new symptoms  Will do a follow-up video visit in 2 weeks      Hosea Lockett M.D.

## 2024-05-14 NOTE — TELEPHONE ENCOUNTER
Yes, hold the hydrochlorothiazide and office visit today with me or if no appointments team    Thank you    Hosea Lockett M.D.

## 2024-05-14 NOTE — TELEPHONE ENCOUNTER
PCP see below message from pt regarding holding hydrochlorothiazide, asking if he should continue holding this? Per med list, taking this medication for HTN.     Please advise     Teresa TRUJILLO Triage RN  [facility]

## 2024-05-14 NOTE — PATIENT INSTRUCTIONS
Monitor your blood pressure twice daily.  Let me know if it goes under 90 or over 170.    Call if new symptoms.    Hosea Lockett M.D.

## 2024-05-14 NOTE — TELEPHONE ENCOUNTER
Pt called back - agreed to appointment, scheduled     Appointments in Next Year      May 14, 2024 12:30 PM  (Arrive by 12:10 PM)  Provider Visit with Hosea Lockett MD  [facility] ([facility] ) [phone]          Michael Retana RN  [facility]

## 2024-05-15 LAB
ANION GAP SERPL CALCULATED.3IONS-SCNC: 10 MMOL/L (ref 7–15)
BUN SERPL-MCNC: 27.6 MG/DL (ref 8–23)
CALCIUM SERPL-MCNC: 8.8 MG/DL (ref 8.8–10.2)
CHLORIDE SERPL-SCNC: 111 MMOL/L (ref 98–107)
CREAT SERPL-MCNC: 1.11 MG/DL (ref 0.67–1.17)
DEPRECATED HCO3 PLAS-SCNC: 23 MMOL/L (ref 22–29)
EGFRCR SERPLBLD CKD-EPI 2021: 64 ML/MIN/1.73M2
GLUCOSE SERPL-MCNC: 108 MG/DL (ref 70–99)
POTASSIUM SERPL-SCNC: 4.4 MMOL/L (ref 3.4–5.3)
SODIUM SERPL-SCNC: 144 MMOL/L (ref 135–145)

## 2024-05-15 NOTE — RESULT ENCOUNTER NOTE
It was nice to see you.  Your should be able to view the lab results.    Your labs look just fine.  No concerns there.    Hosea Lockett M.D.

## 2024-05-31 ENCOUNTER — VIRTUAL VISIT (OUTPATIENT)
Dept: FAMILY MEDICINE | Facility: CLINIC | Age: 89
End: 2024-05-31
Payer: COMMERCIAL

## 2024-05-31 DIAGNOSIS — R53.83 OTHER FATIGUE: ICD-10-CM

## 2024-05-31 DIAGNOSIS — I10 BENIGN ESSENTIAL HYPERTENSION: Primary | ICD-10-CM

## 2024-05-31 DIAGNOSIS — R71.8 ELEVATED MCV: ICD-10-CM

## 2024-05-31 PROCEDURE — 99442 PR PHYSICIAN TELEPHONE EVALUATION 11-20 MIN: CPT | Mod: 93 | Performed by: INTERNAL MEDICINE

## 2024-05-31 NOTE — PROGRESS NOTES
Salvador is a 89 year old who is being evaluated via a billable telephone visit.    What phone number would you like to be contacted at? [phone]  How would you like to obtain your AVS? Liquipel  Originating Location (pt. Location): Home    Distant Location (provider location):  On-site    This is a telephone visit for follow-up to his hypertension.  As noted in review the patient has a long history of hypertension.  I then saw him on May 14 due to a blood pressure change.  He had been not feeling well and working outside and took his blood pressure and it was low.  He stopped his hydrochlorothiazide.  I then saw him on May 14 at which time his blood pressure was 134/80.  He has been feeling well.  I recommended he stay off the hydrochlorothiazide, monitor his blood pressure and we are doing a follow-up visit today regarding this.  .  He had felt fine prior to the episode.  I also did labs including a CBC and basic panel that were really unremarkable.  He sent me a Liquipel message which I have reviewed documenting his blood pressure readings since then.  As noted the average reading is 130/65 down to 120/66.  In his Raise message he does note min alcohol use but some decreased energy use.  His wife who is an rn has been checking his blood pressure and no ortho changes.  He has had anymore feelings of feeling faint or weak.    I discussed this with the patient and wife.  He is doing very well.  They will come back in for more labs given his fatigue but I doubt we will find much.  She will monitor the blood pressure.  He will follow-up towards the end of August for his physical.    Hosea Lockett M.D.  13 minutes on the day of the encounter doing chart review, history and exam, documentation and further activities as noted above.

## 2024-06-03 ENCOUNTER — LAB (OUTPATIENT)
Dept: LAB | Facility: CLINIC | Age: 89
End: 2024-06-03
Payer: COMMERCIAL

## 2024-06-03 DIAGNOSIS — R53.83 OTHER FATIGUE: ICD-10-CM

## 2024-06-03 DIAGNOSIS — R71.8 ELEVATED MCV: ICD-10-CM

## 2024-06-03 LAB — FOLATE SERPL-MCNC: >40 NG/ML (ref 4.6–34.8)

## 2024-06-03 PROCEDURE — 83550 IRON BINDING TEST: CPT

## 2024-06-03 PROCEDURE — 82746 ASSAY OF FOLIC ACID SERUM: CPT

## 2024-06-03 PROCEDURE — 82607 VITAMIN B-12: CPT

## 2024-06-03 PROCEDURE — 84443 ASSAY THYROID STIM HORMONE: CPT

## 2024-06-03 PROCEDURE — 36415 COLL VENOUS BLD VENIPUNCTURE: CPT

## 2024-06-03 PROCEDURE — 82728 ASSAY OF FERRITIN: CPT

## 2024-06-03 PROCEDURE — 83540 ASSAY OF IRON: CPT

## 2024-06-04 LAB
FERRITIN SERPL-MCNC: 161 NG/ML (ref 31–409)
IRON BINDING CAPACITY (ROCHE): 247 UG/DL (ref 240–430)
IRON SATN MFR SERPL: 47 % (ref 15–46)
IRON SERPL-MCNC: 117 UG/DL (ref 61–157)
TSH SERPL DL<=0.005 MIU/L-ACNC: 0.95 UIU/ML (ref 0.3–4.2)
VIT B12 SERPL-MCNC: 587 PG/ML (ref 232–1245)

## 2024-06-05 NOTE — RESULT ENCOUNTER NOTE
Your labs all look fine, nothing to cause fatigue.  The higher levels of iron and folic acid are not a problem.    Let me know if you have questions.    Hosea Lockett M.D.

## 2024-09-13 ENCOUNTER — OFFICE VISIT (OUTPATIENT)
Dept: FAMILY MEDICINE | Facility: CLINIC | Age: 89
End: 2024-09-13
Payer: COMMERCIAL

## 2024-09-13 VITALS
BODY MASS INDEX: 25.52 KG/M2 | DIASTOLIC BLOOD PRESSURE: 60 MMHG | HEART RATE: 59 BPM | TEMPERATURE: 98.1 F | WEIGHT: 158.8 LBS | SYSTOLIC BLOOD PRESSURE: 106 MMHG | OXYGEN SATURATION: 96 % | RESPIRATION RATE: 18 BRPM | HEIGHT: 66 IN

## 2024-09-13 DIAGNOSIS — Z01.818 PREOP EXAM FOR INTERNAL MEDICINE: Primary | ICD-10-CM

## 2024-09-13 DIAGNOSIS — H25.10 SENILE NUCLEAR SCLEROSIS, UNSPECIFIED LATERALITY: ICD-10-CM

## 2024-09-13 PROCEDURE — 99214 OFFICE O/P EST MOD 30 MIN: CPT | Performed by: INTERNAL MEDICINE

## 2024-09-13 ASSESSMENT — PAIN SCALES - GENERAL: PAINLEVEL: NO PAIN (0)

## 2024-09-13 NOTE — PROGRESS NOTES
Preoperative Evaluation  [facility]  [address], [address]  [place] [facility] [place] [address]  Phone: [phone]  Primary Provider: Hosea Lockett MD  Pre-op Performing Provider: Jeff Pacheco MD  Sep 13, 2024             9/12/2024   Surgical Information   What procedure is being done? Bilatral cataract surgery   Facility or Hospital where procedure/surgery will be performed: [facility]   Who is doing the procedure / surgery? Dr. Ap Kimball MD   Date of surgery / procedure: 09/18/2024 right eye and 10/07/24 lft eye   Time of surgery / procedure: 1:30 pm/ 1:30pm   Where do you plan to recover after surgery? at home with family        Fax number for surgical facility: [phone]    Assessment & Plan     The proposed surgical procedure is considered LOW risk.    Preop exam for internal medicine  Low and acceptable risk. Proceed as planned. Take all meds as scheduled.      Senile nuclear sclerosis, unspecified laterality  As above.        - No identified additional risk factors other than previously addressed         Recommendation  Approval given to proceed with proposed procedure, without further diagnostic evaluation.    Subjective   Salvador is a 89 year old, presenting for the following:  Pre-Op Exam        HPI related to upcoming procedure:     Pt is new to me but not the clinic. Planning above low risk procedure.    No prior anesthesia intolerance.    No chest pain, palpitations or exertional dyspnea.      He does do yard work.  Climbs stairs at home.          9/12/2024   Pre-Op Questionnaire   Have you ever had a heart attack or stroke? No   Have you ever had surgery on your heart or blood vessels, such as a stent placement, a coronary artery bypass, or surgery on an artery in your head, neck, heart, or legs? (!) YES Vein stripping   Do you have chest pain with activity? No   Do you have a history of heart failure? No   Do you currently have a cold, bronchitis  or symptoms of other infection? No   Do you have a cough, shortness of breath, or wheezing? No   Do you or anyone in your family have previous history of blood clots? (!) YES isolated in the 1980s while returning from [place].     Do you or does anyone in your family have a serious bleeding problem such as prolonged bleeding following surgeries or cuts? (!) YES Mother (fibroids)   Have you ever had problems with anemia or been told to take iron pills? No   Have you had any abnormal blood loss such as black, tarry or bloody stools? (!) YES with cuts (on DOAC)   Have you ever had a blood transfusion? No   Are you willing to have a blood transfusion if it is medically needed before, during, or after your surgery? Yes   Have you or any of your relatives ever had problems with anesthesia? No   Do you have sleep apnea, excessive snoring or daytime drowsiness? No   Do you have any artifical heart valves or other implanted medical devices like a pacemaker, defibrillator, or continuous glucose monitor? No   Do you have artificial joints? No   Are you allergic to latex? No        Health Care Directive  Patient has a Health Care Directive on file      Preoperative Review of    reviewed - no record of controlled substances prescribed.          Patient Active Problem List    Diagnosis Date Noted    Stage 3a chronic kidney disease (H) 10/10/2022     Priority: Medium    Right ureteral stone 06/13/2021     Priority: Medium    Bilateral leg edema 11/30/2020     Priority: Medium    DVT, lower extremity, distal, acute, right (H)      Priority: Medium    Adenomatous colon polyp 01/01/2019     Priority: Medium    Benign essential hypertension      Priority: Medium     us done and no hydro but renal cyst      Eczema      Priority: Medium    HYPERLIPIDEMIA LDL GOAL <160 10/31/2010     Priority: Medium    MALIG NEOPLASM  /SKIN NECK-rx irradiation 01/28/2008     Priority: Medium     IMO update changed this record. 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    Hx of colonoscopy 2011    nl    Hydronephrosis of left kidney 2010    seen on above ct    Nephrolithiasis 2010    seen on ct, hydronephrosis seen as well; had richy and hydro 6/21, stenting done.    Prostate cancer (H) 2008    had surgery, Zohra Martinez    Skin cancer, basal cell 2002, 2008    had xrt 2002, mohs surgery 2008    Thrombophlebitis 1985     Past Surgical History:   Procedure Laterality Date    CIRCUMCISION  3/12/2013    Procedure: CIRCUMCISION;  CIRCUMCISION;  Surgeon: Stevenson Martinez MD;  Location: [facility]    COLONOSCOPY  2011; 2019    Dr. Wills; [facility]    GENITOURINARY SURGERY      prostatectomy    HERNIA REPAIR  1961    Hernia    LASER HOLMIUM LITHOTRIPSY URETER(S), INSERT STENT, COMBINED Right 6/13/2021    Procedure: Cystoscopy, right ureter stent placement;  Surgeon: Rishi Vincent MD;  Location:  OR    LASER HOLMIUM LITHOTRIPSY URETER(S), INSERT STENT, COMBINED Right 7/2/2021    Procedure: CYSTOSCOPY, RIGHT URETEROSCOPY, HOLMIUM LASER LITHOTRIPSY, RIGHT URETERAL STENT EXCHANGE;  Surgeon: Michelle  "Stevenson MEDEL MD;  Location:  OR    mohs surgery  2008, 2011    basal cell    VASCULAR SURGERY  1985    vein stripping both legs some    [facility] NONSPECIFIC PROCEDURE  13, 35    HERNIORRHAPHY    [facility] NONSPECIFIC PROCEDURE  1985    VEIN STRIPPING     Current Outpatient Medications   Medication Sig Dispense Refill    ascorbic acid 1000 MG TABS tablet Take 1,000 mg by mouth 2 times daily      Cholecalciferol (VITAMIN D PO) Take 1,000 Units by mouth daily       dorzolamide-timolol PF (COSOPT) 22.3-6.8 MG/ML opthalmic solution Place 1 drop into both eyes 2 times daily      ferrous sulfate (FEROSUL) 325 (65 Fe) MG tablet Take 325 mg by mouth daily (with breakfast)      latanoprost (XALATAN) 0.005 % ophthalmic solution Place 1 drop into both eyes daily       Magnesium 500 MG CAPS       MULTIPLE VITAMIN PO Take 1 tablet by mouth every morning      rivaroxaban ANTICOAGULANT (XARELTO ANTICOAGULANT) 20 MG TABS tablet TAKE 1 TABLET (20 MG) BY MOUTH DAILY (WITH DINNER) 90 tablet 3    triamcinolone (KENALOG) 0.1 % external cream Apply topically daily as needed for irritation (Back)       vitamin B complex with vitamin C (STRESS TAB) tablet Take 1 tablet by mouth daily      zinc gluconate 50 MG tablet Take 50 mg by mouth daily         Allergies   Allergen Reactions    Cat Hair [Cats]     Amoxicillin Rash        Social History     Tobacco Use    Smoking status: Never    Smokeless tobacco: Never   Substance Use Topics    Alcohol use: Yes     Alcohol/week: 0.0 standard drinks of alcohol     Comment: occasional       History   Drug Use No             Review of Systems  Constitutional, HEENT, cardiovascular, pulmonary, gi and gu systems are negative, except as otherwise noted.    Objective    /60 (BP Location: Left leg, Patient Position: Sitting, Cuff Size: Adult Regular)   Pulse 59   Temp 98.1  F (36.7  C) (Temporal)   Resp 18   Ht 1.679 m (5' 6.1\")   Wt 72 kg (158 lb 12.8 oz)   SpO2 96%   BMI 25.55 kg/m     Estimated body mass " "index is 25.55 kg/m  as calculated from the following:    Height as of this encounter: 1.679 m (5' 6.1\").    Weight as of this encounter: 72 kg (158 lb 12.8 oz).  Physical Exam  GENERAL: alert and no distress  NECK: no adenopathy, no asymmetry, masses, or scars  RESP: lungs clear to auscultation - no rales, rhonchi or wheezes  CV: regular rate and rhythm, normal S1 S2, no S3 or S4, no murmur, click or rub, no peripheral edema  ABDOMEN: soft, nontender, no hepatosplenomegaly, no masses and bowel sounds normal  MS: no gross musculoskeletal defects noted, no edema    Recent Labs   Lab Test 05/14/24  1229   HGB 13.5         POTASSIUM 4.4   CR 1.11        Diagnostics  No labs were ordered during this visit.   No EKG required for low risk surgery (cataract, skin procedure, breast biopsy, etc).    Revised Cardiac Risk Index (RCRI)  The patient has the following serious cardiovascular risks for perioperative complications:   - No serious cardiac risks = 0 points     RCRI Interpretation: 0 points: Class I (very low risk - 0.4% complication rate)         Signed Electronically by: Jeff Pacheco MD  A copy of this evaluation report is provided to the requesting physician.         "

## 2024-09-26 ENCOUNTER — OFFICE VISIT (OUTPATIENT)
Dept: FAMILY MEDICINE | Facility: CLINIC | Age: 89
End: 2024-09-26
Payer: COMMERCIAL

## 2024-09-26 VITALS
BODY MASS INDEX: 25.07 KG/M2 | RESPIRATION RATE: 16 BRPM | SYSTOLIC BLOOD PRESSURE: 144 MMHG | HEART RATE: 54 BPM | OXYGEN SATURATION: 99 % | HEIGHT: 66 IN | WEIGHT: 156 LBS | DIASTOLIC BLOOD PRESSURE: 80 MMHG | TEMPERATURE: 97.7 F

## 2024-09-26 DIAGNOSIS — Z00.00 ENCOUNTER FOR MEDICARE ANNUAL WELLNESS EXAM: Primary | ICD-10-CM

## 2024-09-26 DIAGNOSIS — N18.31 STAGE 3A CHRONIC KIDNEY DISEASE (H): ICD-10-CM

## 2024-09-26 DIAGNOSIS — C61 PROSTATE CANCER (H): ICD-10-CM

## 2024-09-26 DIAGNOSIS — I10 BENIGN ESSENTIAL HYPERTENSION: ICD-10-CM

## 2024-09-26 DIAGNOSIS — E78.5 HYPERLIPIDEMIA LDL GOAL <160: ICD-10-CM

## 2024-09-26 DIAGNOSIS — I82.4Z1 DVT, LOWER EXTREMITY, DISTAL, ACUTE, RIGHT (H): ICD-10-CM

## 2024-09-26 DIAGNOSIS — D12.6 ADENOMATOUS POLYP OF COLON, UNSPECIFIED PART OF COLON: ICD-10-CM

## 2024-09-26 LAB
ALBUMIN SERPL BCG-MCNC: 4.2 G/DL (ref 3.5–5.2)
ALP SERPL-CCNC: 61 U/L (ref 40–150)
ALT SERPL W P-5'-P-CCNC: 10 U/L (ref 0–70)
ANION GAP SERPL CALCULATED.3IONS-SCNC: 10 MMOL/L (ref 7–15)
AST SERPL W P-5'-P-CCNC: 17 U/L (ref 0–45)
BILIRUB SERPL-MCNC: 0.9 MG/DL
BUN SERPL-MCNC: 26.9 MG/DL (ref 8–23)
CALCIUM SERPL-MCNC: 9.4 MG/DL (ref 8.8–10.4)
CHLORIDE SERPL-SCNC: 107 MMOL/L (ref 98–107)
CHOLEST SERPL-MCNC: 195 MG/DL
CREAT SERPL-MCNC: 1.2 MG/DL (ref 0.67–1.17)
EGFRCR SERPLBLD CKD-EPI 2021: 58 ML/MIN/1.73M2
ERYTHROCYTE [DISTWIDTH] IN BLOOD BY AUTOMATED COUNT: 12.8 % (ref 10–15)
FASTING STATUS PATIENT QL REPORTED: YES
FASTING STATUS PATIENT QL REPORTED: YES
GLUCOSE SERPL-MCNC: 95 MG/DL (ref 70–99)
HCO3 SERPL-SCNC: 25 MMOL/L (ref 22–29)
HCT VFR BLD AUTO: 45 % (ref 40–53)
HDLC SERPL-MCNC: 102 MG/DL
HGB BLD-MCNC: 14.3 G/DL (ref 13.3–17.7)
LDLC SERPL CALC-MCNC: 76 MG/DL
MCH RBC QN AUTO: 31.9 PG (ref 26.5–33)
MCHC RBC AUTO-ENTMCNC: 31.8 G/DL (ref 31.5–36.5)
MCV RBC AUTO: 100 FL (ref 78–100)
NONHDLC SERPL-MCNC: 93 MG/DL
PLATELET # BLD AUTO: 228 10E3/UL (ref 150–450)
POTASSIUM SERPL-SCNC: 4.6 MMOL/L (ref 3.4–5.3)
PROT SERPL-MCNC: 6.9 G/DL (ref 6.4–8.3)
RBC # BLD AUTO: 4.48 10E6/UL (ref 4.4–5.9)
SODIUM SERPL-SCNC: 142 MMOL/L (ref 135–145)
TRIGL SERPL-MCNC: 83 MG/DL
WBC # BLD AUTO: 5.8 10E3/UL (ref 4–11)

## 2024-09-26 PROCEDURE — 80061 LIPID PANEL: CPT | Performed by: INTERNAL MEDICINE

## 2024-09-26 PROCEDURE — G0439 PPPS, SUBSEQ VISIT: HCPCS | Performed by: INTERNAL MEDICINE

## 2024-09-26 PROCEDURE — 36415 COLL VENOUS BLD VENIPUNCTURE: CPT | Performed by: INTERNAL MEDICINE

## 2024-09-26 PROCEDURE — 85027 COMPLETE CBC AUTOMATED: CPT | Performed by: INTERNAL MEDICINE

## 2024-09-26 PROCEDURE — 80053 COMPREHEN METABOLIC PANEL: CPT | Performed by: INTERNAL MEDICINE

## 2024-09-26 ASSESSMENT — PAIN SCALES - GENERAL: PAINLEVEL: NO PAIN (0)

## 2024-09-26 NOTE — PROGRESS NOTES
Preventive Care Visit  [facility] JAIME Lockett MD, Internal Medicine  Sep 26, 2024          Iwona Franco is a 89 year old, presenting for the following:    He is doing very well.  He has no complaints.  He is not exercising regularly and I discussed the need to.  He has a history of colon polyp and we discussed follow-up and the fact that we could miss things if we do not look but he is comfortable not doing it.               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    Hx of colonoscopy 2011    nl    Hydronephrosis of left kidney 2010    seen on above ct    Nephrolithiasis 2010    seen on ct, hydronephrosis seen as well; had richy and hydro 6/21, stenting done.    Prostate cancer (H) 2008    had surgery, Zohra Martinez    Skin cancer, basal cell 2002, 2008    had xrt 2002, mohs surgery 2008    Thrombophlebitis 1985             Past Surgical History:      Past Surgical History:   Procedure Laterality Date    CATARACT EXTRACTION  09/2024    CIRCUMCISION  03/12/2013    Procedure: CIRCUMCISION;  CIRCUMCISION;  Surgeon: Stevenson Martinez MD;  Location: [facility]    COLONOSCOPY  2011; 2019    Dr. Wills; [facility]     GENITOURINARY SURGERY      prostatectomy    HERNIA REPAIR  1961    Hernia    LASER HOLMIUM LITHOTRIPSY URETER(S), INSERT STENT, COMBINED Right 06/13/2021    Procedure: Cystoscopy, right ureter stent placement;  Surgeon: Rsihi Vincent MD;  Location:  OR    LASER HOLMIUM LITHOTRIPSY URETER(S), INSERT STENT, COMBINED Right 07/02/2021    Procedure: CYSTOSCOPY, RIGHT URETEROSCOPY, HOLMIUM LASER LITHOTRIPSY, RIGHT URETERAL STENT EXCHANGE;  Surgeon: Stevenson Martinez MD;  Location:  OR    mohs surgery  2008, 2011    basal cell    VASCULAR SURGERY  1985    vein stripping both legs some    [facility] NONSPECIFIC PROCEDURE  13, 35    HERNIORRHAPHY    [facility] NONSPECIFIC PROCEDURE  1985    VEIN STRIPPING             Social History:     Social History     Socioeconomic History    Marital status:      Spouse name: Not on file    Number of children: 3    Years of education: Not on file    Highest education level: Not on file   Occupational History    Occupation: [employer]     Employer: RETIRED   Tobacco Use    Smoking status: Never    Smokeless tobacco: Never   Substance and Sexual Activity    Alcohol use: Yes     Alcohol/week: 0.0 standard drinks of alcohol     Comment: occasional    Drug use: No    Sexual activity: Not Currently     Partners: Male   Other Topics Concern    Parent/sibling w/ CABG, MI or angioplasty before 65F 55M? No   Social History Narrative    Not on file     Social Determinants of Health     Financial Resource Strain: Low Risk  (9/24/2024)    Financial Resource Strain     Within the past 12 months, have you or your family members you live with been unable to get utilities (heat, electricity) when it was really needed?: No   Food Insecurity: Low Risk  (9/24/2024)    Food Insecurity     Within the past 12 months, did you worry that your food would run out before you got money to buy more?: No     Within the past 12 months, did the food you bought just not last and you didn t have money to get  more?: No   Transportation Needs: Low Risk  (9/24/2024)    Transportation Needs     Within the past 12 months, has lack of transportation kept you from medical appointments, getting your medicines, non-medical meetings or appointments, work, or from getting things that you need?: No   Physical Activity: Insufficiently Active (9/24/2024)    Exercise Vital Sign     Days of Exercise per Week: 2 days     Minutes of Exercise per Session: 30 min   Stress: No Stress Concern Present (9/24/2024)    [place] [employer] - Occupational Stress Questionnaire     Feeling of Stress : Only a little   Social Connections: Unknown (9/24/2024)    Social Connection and Isolation Panel [NHANES]     Frequency of Communication with Friends and Family: Not on file     Frequency of Social Gatherings with Friends and Family: Three times a week     Attends Christian Services: Not on file     Active Member of Clubs or Organizations: Not on file     Attends Club or Organization Meetings: Not on file     Marital Status: Not on file   Interpersonal Safety: Not on file   Housing Stability: Low Risk  (9/24/2024)    Housing Stability     Do you have housing? : Yes     Are you worried about losing your housing?: No             Family History:   reviewed         Allergies:     Allergies   Allergen Reactions    Cat Hair [Cats]     Amoxicillin Rash             Medications:     Current Outpatient Medications   Medication Sig Dispense Refill    ascorbic acid 1000 MG TABS tablet Take 1,000 mg by mouth 2 times daily      Cholecalciferol (VITAMIN D PO) Take 1,000 Units by mouth daily       dorzolamide-timolol PF (COSOPT) 22.3-6.8 MG/ML opthalmic solution Place 1 drop into both eyes 2 times daily      ferrous sulfate (FEROSUL) 325 (65 Fe) MG tablet Take 325 mg by mouth daily (with breakfast)      latanoprost (XALATAN) 0.005 % ophthalmic solution Place 1 drop into both eyes daily       Magnesium 500 MG CAPS       MULTIPLE VITAMIN PO Take 1  "tablet by mouth every morning      rivaroxaban ANTICOAGULANT (XARELTO ANTICOAGULANT) 20 MG TABS tablet TAKE 1 TABLET (20 MG) BY MOUTH DAILY (WITH DINNER) 90 tablet 3    triamcinolone (KENALOG) 0.1 % external cream Apply topically daily as needed for irritation (Back)       vitamin B complex with vitamin C (STRESS TAB) tablet Take 1 tablet by mouth daily      zinc gluconate 50 MG tablet Take 50 mg by mouth daily                 Review of Systems:     The 10 point Review of Systems is negative other than noted in the HPI           Physical Exam:   Blood pressure (!) 144/80, pulse 54, temperature 97.7  F (36.5  C), temperature source Temporal, resp. rate 16, height 1.676 m (5' 6\"), weight 70.8 kg (156 lb), SpO2 99%.    Exam:  Constitutional: healthy appearing, alert and in no distress  Heent: Normocephalic. Head without obvious masses or lesions. PERRLDC, EOMI. Mouth exam within normal limits: tongue, mucous membranes, posterior pharynx all normal, no lesions or abnormalities seen.  Tm's and canals within normal limits bilaterally. Neck supple, no nuchal rigidity or masses. No supraclavicular, or cervical adenopathy. Thyroid symmetric, no masses.  Cardiovascular: Regular rate and rhythm, no murmer, rub or gallops.  JVP not elevated, no edema.  Carotids within normal limits bilaterally, no bruits.  Respiratory: Normal respiratory effort.  Lungs clear, normal flow, no wheezing or crackles.  Breasts: Normal bilaterally.  No masses or lesions.  Nipples within normal limites.  No axillary lesions or nodes.  Gastrointestinal: Normal active bowel sounds.   Soft, not tender, no masses, guarding or rebound.  No hepatosplenomegaly.   Genitourinary: Rectal not done  Musculoskeletal: extremities normal, no gross deformities noted.  Skin: no suspicious lesions or rashes   Neurologic: Mental status within normal limits.  Speech fluent.  No gross motor abnormalities and gait intact.  Psychiatric: mentation appears normal and affect " normal.         Data:   Labs sent        Assessment:   Normal complete physical exam  CKD, labs today  Prostate cancer, no evidence of disease  DVT, lifelong anticoagulation  Colon polyp, no follow-up  Hypertension, control adequate off meds  Hyperlipidemia, labs today  Healthcare maintenance         Plan:   Vaccines at pharmacy  Letter with labs  Exercise and diet  Call if change      Hosea Lockett M.D.            No chief complaint on file.          Health Care Directive  Patient has a Health Care Directive on file  Advance care planning document is on file and is current.    HPI              9/24/2024   General Health   How would you rate your overall physical health? Good   Feel stress (tense, anxious, or unable to sleep) Only a little      (!) STRESS CONCERN      9/24/2024   Nutrition   Diet: Regular (no restrictions)            9/24/2024   Exercise   Days per week of moderate/strenous exercise 2 days   Average minutes spent exercising at this level 30 min      (!) EXERCISE CONCERN      9/24/2024   Social Factors   Frequency of gathering with friends or relatives Three times a week   Worry food won't last until get money to buy more No   Food not last or not have enough money for food? No   Do you have housing? (Housing is defined as stable permanent housing and does not include staying ouside in a car, in a tent, in an abandoned building, in an overnight shelter, or couch-surfing.) Yes   Are you worried about losing your housing? No   Lack of transportation? No   Unable to get utilities (heat,electricity)? No            9/26/2024   Fall Risk   Gait Speed Test (Document in seconds) 4.46   Gait Speed Test Interpretation Less than or equal to 5.00 seconds - PASS             9/24/2024   Activities of Daily Living- Home Safety   Needs help with the following daily activites None of the above   Safety concerns in the home None of the above            9/24/2024   Dental   Dentist two times every year? Yes             9/24/2024   Hearing Screening   Hearing concerns? (!) IT'S HARD TO FOLLOW A CONVERSATION IN A NOISY RESTAURANT OR CROWDED ROOM.    (!) TROUBLE UNDERSTANDING SOFT OR WHISPERED SPEECH.       Multiple values from one day are sorted in reverse-chronological order         9/24/2024   Driving Risk Screening   Patient/family members have concerns about driving No            9/24/2024   General Alertness/Fatigue Screening   Have you been more tired than usual lately? No            9/24/2024   Urinary Incontinence Screening   Bothered by leaking urine in past 6 months Yes            9/24/2024   TB Screening   Were you born outside of the [place]? No            Today's PHQ-2 Score:       9/26/2024    10:14 AM   PHQ-2 ( 1999 [employer])   Q1: Little interest or pleasure in doing things 0   Q2: Feeling down, depressed or hopeless 0   PHQ-2 Score 0   Q1: Little interest or pleasure in doing things Not at all   Q2: Feeling down, depressed or hopeless Not at all   PHQ-2 Score 0           9/24/2024   Substance Use   Alcohol more than 3/day or more than 7/wk No   Do you have a current opioid prescription? No   How severe/bad is pain from 1 to 10? 0/10 (No Pain)   Do you use any other substances recreationally? No        Social History     Tobacco Use    Smoking status: Never    Smokeless tobacco: Never   Substance Use Topics    Alcohol use: Yes     Alcohol/week: 0.0 standard drinks of alcohol     Comment: occasional    Drug use: No             Reviewed and updated as needed this visit by Provider       Med Hx                Current providers sharing in care for this patient include:  Patient Care Team:  Hosea Lockett MD as PCP - General (Internal Medicine)  Hosea Lockett MD as Assigned PCP    The following health maintenance items are reviewed in Epic and correct as of today:  Health Maintenance   Topic Date Due    MICROALBUMIN  Never done    ANNUAL REVIEW OF HM ORDERS  Never done    HEPATITIS A IMMUNIZATION (1 of 2 - Risk  "2-dose series) 02/17/1954    HEPATITIS B IMMUNIZATION (1 of 3 - Risk 3-dose series) Never done    RSV VACCINE (1 - 1-dose 75+ series) Never done    LIPID  08/24/2024    INFLUENZA VACCINE (1) 09/01/2024    COVID-19 Vaccine (8 - 2024-25 season) 09/01/2024    BMP  05/14/2025    HEMOGLOBIN  05/14/2025    MEDICARE ANNUAL WELLNESS VISIT  09/26/2025    FALL RISK ASSESSMENT  09/26/2025    ADVANCE CARE PLANNING  08/24/2028    DTAP/TDAP/TD IMMUNIZATION (4 - Td or Tdap) 07/23/2033    PHQ-2 (once per calendar year)  Completed    Pneumococcal Vaccine: 65+ Years  Completed    URINALYSIS  Completed    ZOSTER IMMUNIZATION  Completed    HPV IMMUNIZATION  Aged Out    MENINGITIS IMMUNIZATION  Aged Out    RSV MONOCLONAL ANTIBODY  Aged Out            Objective    Exam  There were no vitals taken for this visit.   Estimated body mass index is 25.55 kg/m  as calculated from the following:    Height as of 9/13/24: 1.679 m (5' 6.1\").    Weight as of 9/13/24: 72 kg (158 lb 12.8 oz).    Physical Exam          9/26/2024   Mini Cog   Clock Draw Score 2 Normal   3 Item Recall 3 objects recalled   Mini Cog Total Score 5                 Signed Electronically by: Hosea Lockett MD    "

## 2024-09-26 NOTE — PATIENT INSTRUCTIONS
Patient Education   Preventive Care Advice   This is general advice given by our system to help you stay healthy. However, your care team may have specific advice just for you. Please talk to your care team about your preventive care needs.  Nutrition  Eat 5 or more servings of fruits and vegetables each day.  Try wheat bread, brown rice and whole grain pasta (instead of white bread, rice, and pasta).  Get enough calcium and vitamin D. Check the label on foods and aim for 100% of the RDA (recommended daily allowance).  Lifestyle  Exercise at least 150 minutes each week  (30 minutes a day, 5 days a week).  Do muscle strengthening activities 2 days a week. These help control your weight and prevent disease.  No smoking.  Wear sunscreen to prevent skin cancer.  Have a dental exam and cleaning every 6 months.  Yearly exams  See your health care team every year to talk about:  Any changes in your health.  Any medicines your care team has prescribed.  Preventive care, family planning, and ways to prevent chronic diseases.  Shots (vaccines)   HPV shots (up to age 26), if you've never had them before.  Hepatitis B shots (up to age 59), if you've never had them before.  COVID-19 shot: Get this shot when it's due.  Flu shot: Get a flu shot every year.  Tetanus shot: Get a tetanus shot every 10 years.  Pneumococcal, hepatitis A, and RSV shots: Ask your care team if you need these based on your risk.  Shingles shot (for age 50 and up)  General health tests  Diabetes screening:  Starting at age 35, Get screened for diabetes at least every 3 years.  If you are younger than age 35, ask your care team if you should be screened for diabetes.  Cholesterol test: At age 39, start having a cholesterol test every 5 years, or more often if advised.  Bone density scan (DEXA): At age 50, ask your care team if you should have this scan for osteoporosis (brittle bones).  Hepatitis C: Get tested at least once in your life.  STIs (sexually  transmitted infections)  Before age 24: Ask your care team if you should be screened for STIs.  After age 24: Get screened for STIs if you're at risk. You are at risk for STIs (including HIV) if:  You are sexually active with more than one person.  You don't use condoms every time.  You or a partner was diagnosed with a sexually transmitted infection.  If you are at risk for HIV, ask about PrEP medicine to prevent HIV.  Get tested for HIV at least once in your life, whether you are at risk for HIV or not.  Cancer screening tests  Cervical cancer screening: If you have a cervix, begin getting regular cervical cancer screening tests starting at age 21.  Breast cancer scan (mammogram): If you've ever had breasts, begin having regular mammograms starting at age 40. This is a scan to check for breast cancer.  Colon cancer screening: It is important to start screening for colon cancer at age 45.  Have a colonoscopy test every 10 years (or more often if you're at risk) Or, ask your provider about stool tests like a FIT test every year or Cologuard test every 3 years.  To learn more about your testing options, visit:   .  For help making a decision, visit:   https://bit.ly/wm71546.  Prostate cancer screening test: If you have a prostate, ask your care team if a prostate cancer screening test (PSA) at age 55 is right for you.  Lung cancer screening: If you are a current or former smoker ages 50 to 80, ask your care team if ongoing lung cancer screenings are right for you.  For informational purposes only. Not to replace the advice of your health care provider. Copyright   2023 [facility]. All rights reserved. Clinically reviewed by the [facility]. [place] 660321 - REV 01/24.  Preventing Falls: Care Instructions  Injuries and health problems such as trouble walking or poor eyesight can increase your risk of falling. So can some medicines. But there are things you can do to help  "prevent falls. You can exercise to get stronger. You can also arrange your home to make it safer.    Talk to your doctor about the medicines you take. Ask if any of them increase the risk of falls and whether they can be changed or stopped.   Try to exercise regularly. It can help improve your strength and balance. This can help lower your risk of falling.     Practice fall safety and prevention.    Wear low-heeled shoes that fit well and give your feet good support. Talk to your doctor if you have foot problems that make this hard.  Carry a cellphone or wear a medical alert device that you can use to call for help.  Use stepladders instead of chairs to reach high objects. Don't climb if you're at risk for falls. Ask for help, if needed.  Wear the correct eyeglasses, if you need them.    Make your home safer.    Remove rugs, cords, clutter, and furniture from walkways.  Keep your house well lit. Use night-lights in hallways and bathrooms.  Install and use sturdy handrails on stairways.  Wear nonskid footwear, even inside. Don't walk barefoot or in socks without shoes.    Be safe outside.    Use handrails, curb cuts, and ramps whenever possible.  Keep your hands free by using a shoulder bag or backpack.  Try to walk in well-lit areas. Watch out for uneven ground, changes in pavement, and debris.  Be careful in the winter. Walk on the grass or gravel when sidewalks are slippery. Use de-icer on steps and walkways. Add non-slip devices to shoes.    Put grab bars and nonskid mats in your shower or tub and near the toilet. Try to use a shower chair or bath bench when bathing.   Get into a tub or shower by putting in your weaker leg first. Get out with your strong side first. Have a phone or medical alert device in the bathroom with you.   Where can you learn more?  Go to https://www.Magento.net/patiented  Enter G117 in the search box to learn more about \"Preventing Falls: Care Instructions.\"  Current as of: July 17, " 2023               Content Version: 14.0    0440-4526 [employer].   Care instructions adapted under license by your healthcare professional. If you have questions about a medical condition or this instruction, always ask your healthcare professional. [employer] disclaims any warranty or liability for your use of this information.      Hearing Loss: Care Instructions  Overview     Hearing loss is a sudden or slow decrease in how well you hear. It can range from slight to profound. Permanent hearing loss can occur with aging. It also can happen when you are exposed long-term to loud noise. Examples include listening to loud music, riding motorcycles, or being around other loud machines.  Hearing loss can affect your work and home life. It can make you feel lonely or depressed. You may feel that you have lost your independence. But hearing aids and other devices can help you hear better and feel connected to others.  Follow-up care is a key part of your treatment and safety. Be sure to make and go to all appointments, and call your doctor if you are having problems. It's also a good idea to know your test results and keep a list of the medicines you take.  How can you care for yourself at home?  Avoid loud noises whenever possible. This helps keep your hearing from getting worse.  Always wear hearing protection around loud noises.  Wear a hearing aid as directed.  A professional can help you pick a hearing aid that will work best for you.  You can also get hearing aids over the counter for mild to moderate hearing loss.  Have hearing tests as your doctor suggests. They can show whether your hearing has changed. Your hearing aid may need to be adjusted.  Use other devices as needed. These may include:  Telephone amplifiers and hearing aids that can connect to a television, stereo, radio, or microphone.  Devices that use lights or vibrations. These alert you to the doorbell, a ringing  "telephone, or a baby monitor.  Television closed-captioning. This shows the words at the bottom of the screen. Most new TVs can do this.  TTY (text telephone). This lets you type messages back and forth on the telephone instead of talking or listening. These devices are also called TDD. When messages are typed on the keyboard, they are sent over the phone line to a receiving TTY. The message is shown on a monitor.  Use text messaging, social media, and email if it is hard for you to communicate by telephone.  Try to learn a listening technique called speechreading. It is not lipreading. You pay attention to people's gestures, expressions, posture, and tone of voice. These clues can help you understand what a person is saying. Face the person you are talking to, and have them face you. Make sure the lighting is good. You need to see the other person's face clearly.  Think about counseling if you need help to adjust to your hearing loss.  When should you call for help?  Watch closely for changes in your health, and be sure to contact your doctor if:    You think your hearing is getting worse.     You have new symptoms, such as dizziness or nausea.   Where can you learn more?  Go to https://www.Tzee.net/patiented  Enter R798 in the search box to learn more about \"Hearing Loss: Care Instructions.\"  Current as of: September 27, 2023               Content Version: 14.0    7830-5551 [employer].   Care instructions adapted under license by your healthcare professional. If you have questions about a medical condition or this instruction, always ask your healthcare professional. [employer] disclaims any warranty or liability for your use of this information.      Bladder Training: Care Instructions  Your Care Instructions     Bladder training is used to treat urge incontinence and stress incontinence. Urge incontinence means that the need to urinate comes on so fast that you can't get to a " toilet in time. Stress incontinence means that you leak urine because of pressure on your bladder. For example, it may happen when you laugh, cough, or lift something heavy.  Bladder training can increase how long you can wait before you have to urinate. It can also help your bladder hold more urine. And it can give you better control over the urge to urinate.  It is important to remember that bladder training takes a few weeks to a few months to make a difference. You may not see results right away, but don't give up.  Follow-up care is a key part of your treatment and safety. Be sure to make and go to all appointments, and call your doctor if you are having problems. It's also a good idea to know your test results and keep a list of the medicines you take.  How can you care for yourself at home?  Work with your doctor to come up with a bladder training program that is right for you. You may use one or more of the following methods.  Delayed urination  In the beginning, try to keep from urinating for 5 minutes after you first feel the need to go.  While you wait, take deep, slow breaths to relax. Kegel exercises can also help you delay the need to go to the bathroom.  After some practice, when you can easily wait 5 minutes to urinate, try to wait 10 minutes before you urinate.  Slowly increase the waiting period until you are able to control when you have to urinate.  Scheduled urination  Empty your bladder when you first wake up in the morning.  Schedule times throughout the day when you will urinate.  Start by going to the bathroom every hour, even if you don't need to go.  Slowly increase the time between trips to the bathroom.  When you have found a schedule that works well for you, keep doing it.  If you wake up during the night and have to urinate, do it. Apply your schedule to waking hours only.  Kegel exercises  These tighten and strengthen pelvic muscles, which can help you control the flow of urine. (If  "doing these exercises causes pain, stop doing them and talk with your doctor.) To do Kegel exercises:  Squeeze your muscles as if you were trying not to pass gas. Or squeeze your muscles as if you were stopping the flow of urine. Your belly, legs, and buttocks shouldn't move.  Hold the squeeze for 3 seconds, then relax for 5 to 10 seconds.  Start with 3 seconds, then add 1 second each week until you are able to squeeze for 10 seconds.  Repeat the exercise 10 times a session. Do 3 to 8 sessions a day.  When should you call for help?  Watch closely for changes in your health, and be sure to contact your doctor if:    Your incontinence is getting worse.     You do not get better as expected.   Where can you learn more?  Go to https://www.Conzoom.net/patiented  Enter V684 in the search box to learn more about \"Bladder Training: Care Instructions.\"  Current as of: November 15, 2023               Content Version: 14.0    6531-8163 [employer].   Care instructions adapted under license by your healthcare professional. If you have questions about a medical condition or this instruction, always ask your healthcare professional. [employer], [employer] disclaims any warranty or liability for your use of this information.         "

## 2024-09-26 NOTE — RESULT ENCOUNTER NOTE
It was very nice seeing you today.  You should be able to view your test results.    Your CBC or blood count is normal with no signs of anemia or blood disorders.  Your chemistry panel shows no signs of diabetes.  Your blood salts, kidney test, liver tests, and proteins are all fine.    Your total cholesterol is very good at 195.  Your HDL or good cholesterol and LDL or bad cholesterol look good as well.    I am happy to bring you this excellent report.  Please let me know if you have questions.    Hosea Lockett M.D.

## 2024-10-07 ENCOUNTER — NURSE TRIAGE (OUTPATIENT)
Dept: FAMILY MEDICINE | Facility: CLINIC | Age: 89
End: 2024-10-07
Payer: COMMERCIAL

## 2024-10-07 NOTE — TELEPHONE ENCOUNTER
RN COVID TREATMENT VISIT  10/07/24      The patient has been triaged and does not require a higher level of care.    Jose FAJARDO Oldowski  89 year old  Current weight? 158 lbs    Has the patient been seen by a primary care provider at an [facility] or [facility] within the past two years? Yes.   Have you been in close proximity to/do you have a known exposure to a person with a confirmed case of influenza? No.     General treatment eligibility:  Date of positive COVID test (PCR or at home)?  10/7/24    Are you or have you been hospitalized for this COVID-19 infection? No.   Have you received monoclonal antibodies or antiviral treatment for COVID-19 since this positive test? No.   Do you have any of the following conditions that place you at risk of being very sick from COVID-19?   - Age 50 years or older  - Chronic kidney disease (mild to moderate; eGFR or GFR 30-59 mL/min)  - Heart conditions such as cardiomyopathies, congenital heart defects, coronary artery disease, heart arrhythmias, heart failure, hypertension, valve disorders   Yes, patient has at least one high risk condition as noted above.     Current COVID symptoms:   - cough  - fatigue  - muscle or body aches  - headache  - congestion or runny nose  Yes. Patient has at least one symptom as selected.     How many days since symptoms started? 5 days or less. Established patient, 12 years or older weighing at least 88.2 lbs, who has symptoms that started in the past 5 days, has not been hospitalized nor received treatment already, and is at risk for being very sick from COVID-19.     Treatment eligibility by RN:  Are you currently pregnant or nursing? No  Do you have a clinically significant hypersensitivity to nirmatrelvir or ritonavir, or toxic epidermal necrolysis (TEN) or Bowers-Eduardo Syndrome? No  Do you have a history of hepatitis, any hepatic impairment on the Problem List (such as Child-Douglass Class C, cirrhosis, fatty  liver disease, alcoholic liver disease), or was the last liver lab (hepatic panel, ALT, AST, ALK Phos, bilirubin) elevated in the past 6 months? No  Do you have any history of severe renal impairment (eGFR < 30mL/min)? No    Is patient eligible to continue? Yes, patient meets all eligibility requirements for the RN COVID treatment (as denoted by all no responses above).     Current Outpatient Medications   Medication Sig Dispense Refill    ascorbic acid 1000 MG TABS tablet Take 1,000 mg by mouth 2 times daily      Cholecalciferol (VITAMIN D PO) Take 1,000 Units by mouth daily       dorzolamide-timolol PF (COSOPT) 22.3-6.8 MG/ML opthalmic solution Place 1 drop into both eyes 2 times daily      ferrous sulfate (FEROSUL) 325 (65 Fe) MG tablet Take 325 mg by mouth daily (with breakfast)      latanoprost (XALATAN) 0.005 % ophthalmic solution Place 1 drop into both eyes daily       Magnesium 500 MG CAPS       MULTIPLE VITAMIN PO Take 1 tablet by mouth every morning      rivaroxaban ANTICOAGULANT (XARELTO ANTICOAGULANT) 20 MG TABS tablet TAKE 1 TABLET (20 MG) BY MOUTH DAILY (WITH DINNER) 90 tablet 3    triamcinolone (KENALOG) 0.1 % external cream Apply topically daily as needed for irritation (Back)       vitamin B complex with vitamin C (STRESS TAB) tablet Take 1 tablet by mouth daily      zinc gluconate 50 MG tablet Take 50 mg by mouth daily         Medications from List 1 of the standing order (on medications that exclude the use of Paxlovid) that patient is taking: NONE. Is patient taking Cortes's Wort? No  Is patient taking Cortes's Wort or any meds from List 1? No.   Medications from List 2 of the standing order (on meds that provider needs to adjust) that patient is taking: rivaroxaban (Xarelto), explained a provider visit is necessary to discuss medication adjustments while taking Paxlovid. Is patient on any of the meds from List 2? Yes. Patient will be scheduled or transferred to a  at the end of this  julieth.   Cindy Evangelista RN      Reason for Disposition   HIGH RISK patient (e.g., weak immune system, age > 64 years, obesity with BMI of 30 or higher, pregnant, chronic lung disease or other chronic medical condition) and COVID symptoms (e.g., cough, fever)  (Exceptions: Already seen by doctor or NP/PA and no new or worsening symptoms.)    Additional Information   Negative: SEVERE difficulty breathing (e.g., struggling for each breath, speaks in single words)   Negative: Difficult to awaken or acting confused (e.g., disoriented, slurred speech)   Negative: Bluish (or gray) lips or face now   Negative: Shock suspected (e.g., cold/pale/clammy skin, too weak to stand, low BP, rapid pulse)   Negative: Sounds like a life-threatening emergency to the triager   Negative: Diagnosed or suspected COVID-19 and symptoms lasting 3 or more weeks   Negative: COVID-19 exposure and no symptoms   Negative: COVID-19 vaccine reaction suspected (e.g., fever, headache, muscle aches) occurring 1 to 3 days after getting vaccine   Negative: COVID-19 vaccine, questions about   Negative: Lives with someone known to have influenza (flu test positive) and flu-like symptoms (e.g., cough, runny nose, sore throat, SOB; with or without fever)   Negative: Possible COVID-19 symptoms and triager concerned about severity of symptoms or other causes   Negative: COVID-19 and breastfeeding, questions about   Negative: SEVERE or constant chest pain or pressure  (Exception: Mild central chest pain, present only when coughing.)   Negative: MODERATE difficulty breathing (e.g., speaks in phrases, SOB even at rest, pulse 100-120)   Negative: Headache and stiff neck (can't touch chin to chest)   Negative: Oxygen level (e.g., pulse oximetry) 90% or lower   Negative: Chest pain or pressure  (Exception: MILD central chest pain, present only when coughing.)   Negative: Drinking very little and dehydration suspected (e.g., no urine > 12 hours, very dry mouth, very  "lightheaded)   Negative: Patient sounds very sick or weak to the triager   Negative: Fever > 103 F (39.4 C)   Negative: Fever > 101 F (38.3 C) and over 60 years of age   Negative: Fever > 100.0 F (37.8 C) and bedridden (e.g., CVA, chronic illness, recovering from surgery)   Negative: MILD difficulty breathing (e.g., minimal/no SOB at rest, SOB with walking, pulse <100)    Answer Assessment - Initial Assessment Questions  1. COVID-19 DIAGNOSIS: \"How do you know that you have COVID?\" (e.g., positive lab test or self-test, diagnosed by doctor or NP/PA, symptoms after exposure).        Home test 10/7    2. COVID-19 EXPOSURE: \"Was there any known exposure to COVID before the symptoms began?\" [facility] Definition of close contact: within 6 feet (2 meters) for a total of 15 minutes or more over a 24-hour period.         Spouse had Covid    3. ONSET: \"When did the COVID-19 symptoms start?\"         10/6    4. WORST SYMPTOM: \"What is your worst symptom?\" (e.g., cough, fever, shortness of breath, muscle aches)        Runny nose    5. COUGH: \"Do you have a cough?\" If Yes, ask: \"How bad is the cough?\"          Mild dry cough    6. FEVER: \"Do you have a fever?\" If Yes, ask: \"What is your temperature, how was it measured, and when did it start?\"        None    7. RESPIRATORY STATUS: \"Describe your breathing?\" (e.g., normal; shortness of breath, wheezing, unable to speak)         Normal    8. BETTER-SAME-WORSE: \"Are you getting better, staying the same or getting worse compared to yesterday?\"  If getting worse, ask, \"In what way?\"        Worse due to more symptoms    9. OTHER SYMPTOMS: \"Do you have any other symptoms?\"  (e.g., chills, fatigue, headache, loss of smell or taste, muscle pain, sore throat)        Headache, muscle pain, runny nose    10. HIGH RISK DISEASE: \"Do you have any chronic medical problems?\" (e.g., asthma, heart or lung disease, weak immune system, obesity, etc.)          See dx list    11. VACCINE: \"Have you had the " "COVID-19 vaccine?\" If Yes, ask: \"Which one, how many shots, when did you get it?\"          Yes    12. PREGNANCY: \"Is there any chance you are pregnant?\" \"When was your last menstrual period?\"          N/A    13. O2 SATURATION MONITOR:  \"Do you use an oxygen saturation monitor (pulse oximeter) at home?\" If Yes, ask \"What is your reading (oxygen level) today?\" \"What is your usual oxygen saturation reading?\" (e.g., 95%)          Does not have    Protocols used: Coronavirus (COVID-19) Diagnosed or [id number]    "

## 2024-10-08 ENCOUNTER — VIRTUAL VISIT (OUTPATIENT)
Dept: URGENT CARE | Facility: CLINIC | Age: 89
End: 2024-10-08
Payer: COMMERCIAL

## 2024-10-08 DIAGNOSIS — U07.1 COVID: Primary | ICD-10-CM

## 2024-10-08 PROCEDURE — 99213 OFFICE O/P EST LOW 20 MIN: CPT | Mod: 95 | Performed by: EMERGENCY MEDICINE

## 2024-10-08 NOTE — PROGRESS NOTES
"Phone visit:   Patient location: At home with wife   Provider location: [facility] virtual provider (remote).        The patient has been notified of following:     \"This telephone visit will be conducted via a call between you and your physician/provider. We have found that certain health care needs can be provided without the need for a physical exam.  This service lets us provide the care you need with a short phone conversation.  If a prescription is necessary we can send it directly to your pharmacy.  If lab work is needed we can place an order for that and you can then stop by our lab to have the test done at a later time.    Telephone visits are billed at different rates depending on your insurance coverage. During this emergency period, for some insurers they may be billed the same as an in-person visit.  Please reach out to your insurance provider with any questions.    If during the course of the call the physician/provider feels a telephone visit is not appropriate, you will not be charged for this service.\"    Patient has given verbal consent for Telephone visit?  Yes    What phone number would you like to be contacted at?  [phone]    How would you like to obtain your AVS? MyChart    Subjective   CC: Jose Ray  is a 89 year old male who presents via phone visit today for the following health issues:   Chief Complaint   Patient presents with    Infection          COVID-19 Symptom Review  How many days ago did these symptoms start? 2    Are any of the following symptoms significant for you?  New or worsening difficulty breathing? No  Worsening cough? Yes, I am coughing up mucus.  Fever or chills? Yes, I felt feverish or had chills.  Headache: YES  Sore throat: No  Chest pain: No  Diarrhea: No  Body aches? YES    What treatments has patient tried? Guaifenesin (mucinex) and Nonsteroidals   Does patient live in a nursing home, group home, or shelter? No  Does patient have a way to get " food/medications during quarantined? Yes, I have a friend or family member who can help me. and Yes                     Reviewed and updated as needed this visit by Provider                   Review of Systems         Objective    Gen: Patient is alert, oriented  Gen: No acute distress on phone appointment.  Patient is alert and oriented.  He is nondyspneic sounding speaking in full sentences.              Assessment/Plan:  1. COVID  Patient is an 89-year-old male who is on day 3 of COVID symptoms.  Symptoms are typical and relatively mild including no shortness of breath.  Patient has multiple comorbidities of age at 89, chronic kidney disease, prostate cancer, and history of DVT on Xarelto.  Wife is assisting the call.  Patient is not a candidate for Paxlovid due to the critical nature of him needing to take Xarelto.  IV infusion versus oral medication discussedPreventive Care Visit  [facility]  Jose Grant MD, Emergency Medicine  Oct 8, 2024      Signed Electronically by: Jose Grant MD is opting for oral medication even when noting slightly less efficacy.  Patient will be started on molnupiravir.      - molnupiravir (LAGEVRIO) 200 MG capsule; Take 4 capsules (800 mg) by mouth every 12 hours for 5 days.  Dispense: 40 each; Refill: 0    Phone call duration:  10 minutes    Jose Grant MD

## 2024-10-09 ENCOUNTER — DOCUMENTATION ONLY (OUTPATIENT)
Dept: OTHER | Facility: CLINIC | Age: 89
End: 2024-10-09
Payer: COMMERCIAL

## 2025-02-23 ENCOUNTER — OFFICE VISIT (OUTPATIENT)
Dept: URGENT CARE | Facility: URGENT CARE | Age: 89
End: 2025-02-23
Payer: COMMERCIAL

## 2025-02-23 ENCOUNTER — TELEPHONE (OUTPATIENT)
Dept: URGENT CARE | Facility: URGENT CARE | Age: 89
End: 2025-02-23

## 2025-02-23 VITALS
WEIGHT: 164.8 LBS | TEMPERATURE: 97.7 F | BODY MASS INDEX: 26.6 KG/M2 | DIASTOLIC BLOOD PRESSURE: 87 MMHG | RESPIRATION RATE: 28 BRPM | SYSTOLIC BLOOD PRESSURE: 135 MMHG | HEART RATE: 90 BPM | OXYGEN SATURATION: 99 %

## 2025-02-23 DIAGNOSIS — R60.0 BILATERAL LEG EDEMA: ICD-10-CM

## 2025-02-23 DIAGNOSIS — R06.02 SHORTNESS OF BREATH: Primary | ICD-10-CM

## 2025-02-23 PROCEDURE — 99214 OFFICE O/P EST MOD 30 MIN: CPT | Performed by: NURSE PRACTITIONER

## 2025-02-23 ASSESSMENT — PAIN SCALES - GENERAL: PAINLEVEL_OUTOF10: NO PAIN (0)

## 2025-02-24 NOTE — PATIENT INSTRUCTIONS
Reassured lungs clear, heart sounds normal and VSS.    Monitor breathing.   Reduce sodium, elevate legs when seated.   Unable to get STAT labs or imaging in UC setting  If SOB worsening to go to ER.    Will follow up with Dr Lockett or Dr Malone if this continues or worsens.

## 2025-02-24 NOTE — PROGRESS NOTES
"Assessment & Plan     Shortness of breath    Bilateral leg edema    Patient Instructions   Reassured lungs clear, heart sounds normal and VSS.    Monitor breathing.   Reduce sodium, elevate legs when seated.   Unable to get STAT labs or imaging in UC setting  If SOB worsening to go to ER.    Will follow up with Dr Lockett or Dr Malone if this continues or worsens.        Return in about 1 week (around 3/2/2025) for with regular provider if symptoms persist.    SMITHA Yeager CNP  [facility] KARMA Franco is a 89 year old male who presents to clinic today for the following health issues:  Chief Complaint   Patient presents with    Urgent Care     Pt here with on and off shortness of breath for 2-3 days.          2/23/2025     6:29 PM   Additional Questions   Roomed by Kendall   Accompanied by Usha - wife     OLYA LÓPEZ Adult    Onset of symptoms was 3 day(s) ago.  Course of illness is waxing and waning.    Severity moderate  Current and Associated symptoms: shortness of breath  Treatment measures tried include Rest.  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   Does not happen when outside.  Wondering if it is dirty air?  Wife does not have ANY respiratory issues.      Got a COVID vaccine, flu shot and RSV shot in the last several months.    Denies any chance of COVID exposure.      Went off hydrochlorothiazide and BP meds several months ago because of several hypotensive episodes.   Ankles more swollen in the last few days.    They do not add sodium to foods but endorse eating chips, lunch meat, cheese and soup.      Denies chest pain,  dizziness or lightheadedness.  No pain radiating to left arm or jaw.  No reflux.        Review of Systems  Constitutional, HEENT, cardiovascular, pulmonary, GI, , musculoskeletal, neuro, skin, endocrine and psych systems are " negative, except as otherwise noted.      Objective    /87   Pulse 90   Temp 97.7  F (36.5  C) (Tympanic)   Resp 28   Wt 74.8 kg (164 lb 12.8 oz)   SpO2 99%   BMI 26.60 kg/m    Physical Exam   GENERAL: alert and no distress  RESP: lungs clear to auscultation - no rales, rhonchi or wheezes  CV: regular rate and rhythm, normal S1 S2, no S3 or S4, no murmur, click or rub, no peripheral edema  ABDOMEN: soft, nontender, no hepatosplenomegaly, no masses and bowel sounds normal  MS: 3+ edema to bilateral ankles to mid calf.

## 2025-02-28 ENCOUNTER — APPOINTMENT (OUTPATIENT)
Dept: GENERAL RADIOLOGY | Facility: CLINIC | Age: 89
DRG: 280 | End: 2025-02-28
Attending: EMERGENCY MEDICINE
Payer: COMMERCIAL

## 2025-02-28 ENCOUNTER — HOSPITAL ENCOUNTER (INPATIENT)
Facility: CLINIC | Age: 89
LOS: 4 days | Discharge: HOME | DRG: 280 | End: 2025-03-04
Attending: EMERGENCY MEDICINE | Admitting: HOSPITALIST
Payer: COMMERCIAL

## 2025-02-28 DIAGNOSIS — I50.9 ACUTE ON CHRONIC CONGESTIVE HEART FAILURE, UNSPECIFIED HEART FAILURE TYPE (H): ICD-10-CM

## 2025-02-28 DIAGNOSIS — N18.31 STAGE 3A CHRONIC KIDNEY DISEASE (H): Primary | ICD-10-CM

## 2025-02-28 LAB
ALBUMIN SERPL BCG-MCNC: 4 G/DL (ref 3.5–5.2)
ALP SERPL-CCNC: 94 U/L (ref 40–150)
ALT SERPL W P-5'-P-CCNC: 42 U/L (ref 0–70)
ANION GAP SERPL CALCULATED.3IONS-SCNC: 13 MMOL/L (ref 7–15)
AST SERPL W P-5'-P-CCNC: 27 U/L (ref 0–45)
ATRIAL RATE - MUSE: 94 BPM
BASOPHILS # BLD AUTO: 0 10E3/UL (ref 0–0.2)
BASOPHILS NFR BLD AUTO: 0 %
BILIRUB SERPL-MCNC: 0.9 MG/DL
BUN SERPL-MCNC: 36.6 MG/DL (ref 8–23)
CALCIUM SERPL-MCNC: 9.1 MG/DL (ref 8.8–10.4)
CHLORIDE SERPL-SCNC: 107 MMOL/L (ref 98–107)
CREAT SERPL-MCNC: 1.4 MG/DL (ref 0.67–1.17)
D DIMER PPP FEU-MCNC: 0.38 UG/ML FEU (ref 0–0.5)
DIASTOLIC BLOOD PRESSURE - MUSE: NORMAL MMHG
EGFRCR SERPLBLD CKD-EPI 2021: 48 ML/MIN/1.73M2
EOSINOPHIL # BLD AUTO: 0.1 10E3/UL (ref 0–0.7)
EOSINOPHIL NFR BLD AUTO: 2 %
ERYTHROCYTE [DISTWIDTH] IN BLOOD BY AUTOMATED COUNT: 13.9 % (ref 10–15)
EST. AVERAGE GLUCOSE BLD GHB EST-MCNC: 128 MG/DL
FLUAV RNA SPEC QL NAA+PROBE: NEGATIVE
FLUBV RNA RESP QL NAA+PROBE: NEGATIVE
GLUCOSE SERPL-MCNC: 148 MG/DL (ref 70–99)
HBA1C MFR BLD: 6.1 %
HCO3 SERPL-SCNC: 24 MMOL/L (ref 22–29)
HCT VFR BLD AUTO: 43 % (ref 40–53)
HGB BLD-MCNC: 14.2 G/DL (ref 13.3–17.7)
HOLD SPECIMEN: NORMAL
IMM GRANULOCYTES # BLD: 0 10E3/UL
IMM GRANULOCYTES NFR BLD: 0 %
INTERPRETATION ECG - MUSE: NORMAL
LYMPHOCYTES # BLD AUTO: 1.4 10E3/UL (ref 0.8–5.3)
LYMPHOCYTES NFR BLD AUTO: 19 %
MCH RBC QN AUTO: 32.6 PG (ref 26.5–33)
MCHC RBC AUTO-ENTMCNC: 33 G/DL (ref 31.5–36.5)
MCV RBC AUTO: 99 FL (ref 78–100)
MONOCYTES # BLD AUTO: 0.6 10E3/UL (ref 0–1.3)
MONOCYTES NFR BLD AUTO: 9 %
NEUTROPHILS # BLD AUTO: 5 10E3/UL (ref 1.6–8.3)
NEUTROPHILS NFR BLD AUTO: 70 %
NRBC # BLD AUTO: 0 10E3/UL
NRBC BLD AUTO-RTO: 0 /100
NT-PROBNP SERPL-MCNC: ABNORMAL PG/ML (ref 0–1800)
P AXIS - MUSE: 92 DEGREES
PLATELET # BLD AUTO: 213 10E3/UL (ref 150–450)
POTASSIUM SERPL-SCNC: 4.6 MMOL/L (ref 3.4–5.3)
PR INTERVAL - MUSE: 250 MS
PROT SERPL-MCNC: 6.6 G/DL (ref 6.4–8.3)
QRS DURATION - MUSE: 78 MS
QT - MUSE: 348 MS
QTC - MUSE: 435 MS
R AXIS - MUSE: 31 DEGREES
RBC # BLD AUTO: 4.36 10E6/UL (ref 4.4–5.9)
RSV RNA SPEC NAA+PROBE: NEGATIVE
SARS-COV-2 RNA RESP QL NAA+PROBE: NEGATIVE
SODIUM SERPL-SCNC: 144 MMOL/L (ref 135–145)
SYSTOLIC BLOOD PRESSURE - MUSE: NORMAL MMHG
T AXIS - MUSE: 110 DEGREES
TROPONIN T SERPL HS-MCNC: 50 NG/L
TROPONIN T SERPL HS-MCNC: 53 NG/L
TSH SERPL DL<=0.005 MIU/L-ACNC: 1.26 UIU/ML (ref 0.3–4.2)
VENTRICULAR RATE- MUSE: 94 BPM
WBC # BLD AUTO: 7.1 10E3/UL (ref 4–11)

## 2025-02-28 PROCEDURE — 99223 1ST HOSP IP/OBS HIGH 75: CPT | Performed by: PHYSICIAN ASSISTANT

## 2025-02-28 PROCEDURE — 82310 ASSAY OF CALCIUM: CPT | Performed by: EMERGENCY MEDICINE

## 2025-02-28 PROCEDURE — 85025 COMPLETE CBC W/AUTO DIFF WBC: CPT | Performed by: EMERGENCY MEDICINE

## 2025-02-28 PROCEDURE — 84484 ASSAY OF TROPONIN QUANT: CPT | Performed by: EMERGENCY MEDICINE

## 2025-02-28 PROCEDURE — 85048 AUTOMATED LEUKOCYTE COUNT: CPT | Performed by: EMERGENCY MEDICINE

## 2025-02-28 PROCEDURE — 87637 SARSCOV2&INF A&B&RSV AMP PRB: CPT | Performed by: EMERGENCY MEDICINE

## 2025-02-28 PROCEDURE — 36415 COLL VENOUS BLD VENIPUNCTURE: CPT | Performed by: EMERGENCY MEDICINE

## 2025-02-28 PROCEDURE — 85379 FIBRIN DEGRADATION QUANT: CPT | Performed by: EMERGENCY MEDICINE

## 2025-02-28 PROCEDURE — 80053 COMPREHEN METABOLIC PANEL: CPT | Performed by: EMERGENCY MEDICINE

## 2025-02-28 PROCEDURE — 250N000011 HC RX IP 250 OP 636: Performed by: EMERGENCY MEDICINE

## 2025-02-28 PROCEDURE — 84443 ASSAY THYROID STIM HORMONE: CPT | Performed by: PHYSICIAN ASSISTANT

## 2025-02-28 PROCEDURE — 99285 EMERGENCY DEPT VISIT HI MDM: CPT | Mod: 25

## 2025-02-28 PROCEDURE — 71046 X-RAY EXAM CHEST 2 VIEWS: CPT

## 2025-02-28 PROCEDURE — 83880 ASSAY OF NATRIURETIC PEPTIDE: CPT | Performed by: EMERGENCY MEDICINE

## 2025-02-28 PROCEDURE — 83036 HEMOGLOBIN GLYCOSYLATED A1C: CPT | Performed by: PHYSICIAN ASSISTANT

## 2025-02-28 PROCEDURE — 250N000013 HC RX MED GY IP 250 OP 250 PS 637: Performed by: PHYSICIAN ASSISTANT

## 2025-02-28 PROCEDURE — 85004 AUTOMATED DIFF WBC COUNT: CPT | Performed by: EMERGENCY MEDICINE

## 2025-02-28 PROCEDURE — 93005 ELECTROCARDIOGRAM TRACING: CPT

## 2025-02-28 PROCEDURE — 210N000002 HC R&B HEART CARE

## 2025-02-28 PROCEDURE — 82374 ASSAY BLOOD CARBON DIOXIDE: CPT | Performed by: EMERGENCY MEDICINE

## 2025-02-28 RX ORDER — ACETAMINOPHEN 325 MG/1
650 TABLET ORAL EVERY 4 HOURS PRN
Status: DISCONTINUED | OUTPATIENT
Start: 2025-02-28 | End: 2025-03-04 | Stop reason: HOSPADM

## 2025-02-28 RX ORDER — FUROSEMIDE 10 MG/ML
40 INJECTION INTRAMUSCULAR; INTRAVENOUS 2 TIMES DAILY
Status: DISCONTINUED | OUTPATIENT
Start: 2025-03-01 | End: 2025-03-01

## 2025-02-28 RX ORDER — LIDOCAINE 40 MG/G
CREAM TOPICAL
Status: DISCONTINUED | OUTPATIENT
Start: 2025-02-28 | End: 2025-03-04 | Stop reason: HOSPADM

## 2025-02-28 RX ORDER — ACETAMINOPHEN 650 MG/1
650 SUPPOSITORY RECTAL EVERY 4 HOURS PRN
Status: DISCONTINUED | OUTPATIENT
Start: 2025-02-28 | End: 2025-03-04 | Stop reason: HOSPADM

## 2025-02-28 RX ORDER — AMOXICILLIN 250 MG
2 CAPSULE ORAL 2 TIMES DAILY PRN
Status: DISCONTINUED | OUTPATIENT
Start: 2025-02-28 | End: 2025-03-04 | Stop reason: HOSPADM

## 2025-02-28 RX ORDER — POLYETHYLENE GLYCOL 3350 17 G/17G
17 POWDER, FOR SOLUTION ORAL 2 TIMES DAILY PRN
Status: DISCONTINUED | OUTPATIENT
Start: 2025-02-28 | End: 2025-03-04 | Stop reason: HOSPADM

## 2025-02-28 RX ORDER — ONDANSETRON 2 MG/ML
4 INJECTION INTRAMUSCULAR; INTRAVENOUS EVERY 6 HOURS PRN
Status: DISCONTINUED | OUTPATIENT
Start: 2025-02-28 | End: 2025-03-04 | Stop reason: HOSPADM

## 2025-02-28 RX ORDER — LISINOPRIL 2.5 MG/1
2.5 TABLET ORAL DAILY
Status: DISCONTINUED | OUTPATIENT
Start: 2025-03-01 | End: 2025-03-02

## 2025-02-28 RX ORDER — ONDANSETRON 4 MG/1
4 TABLET, ORALLY DISINTEGRATING ORAL EVERY 6 HOURS PRN
Status: DISCONTINUED | OUTPATIENT
Start: 2025-02-28 | End: 2025-03-04 | Stop reason: HOSPADM

## 2025-02-28 RX ORDER — BISACODYL 10 MG
10 SUPPOSITORY, RECTAL RECTAL DAILY PRN
Status: DISCONTINUED | OUTPATIENT
Start: 2025-02-28 | End: 2025-03-04 | Stop reason: HOSPADM

## 2025-02-28 RX ORDER — AMOXICILLIN 250 MG
1 CAPSULE ORAL 2 TIMES DAILY PRN
Status: DISCONTINUED | OUTPATIENT
Start: 2025-02-28 | End: 2025-03-04 | Stop reason: HOSPADM

## 2025-02-28 RX ORDER — FUROSEMIDE 10 MG/ML
60 INJECTION INTRAMUSCULAR; INTRAVENOUS ONCE
Status: COMPLETED | OUTPATIENT
Start: 2025-02-28 | End: 2025-02-28

## 2025-02-28 RX ORDER — CALCIUM CARBONATE 500 MG/1
1000 TABLET, CHEWABLE ORAL 4 TIMES DAILY PRN
Status: DISCONTINUED | OUTPATIENT
Start: 2025-02-28 | End: 2025-03-04 | Stop reason: HOSPADM

## 2025-02-28 RX ADMIN — RIVAROXABAN 20 MG: 20 TABLET, FILM COATED ORAL at 22:24

## 2025-02-28 RX ADMIN — FUROSEMIDE 60 MG: 10 INJECTION, SOLUTION INTRAVENOUS at 19:52

## 2025-02-28 ASSESSMENT — ACTIVITIES OF DAILY LIVING (ADL)
ADLS_ACUITY_SCORE: 46

## 2025-02-28 ASSESSMENT — COLUMBIA-SUICIDE SEVERITY RATING SCALE - C-SSRS
1. IN THE PAST MONTH, HAVE YOU WISHED YOU WERE DEAD OR WISHED YOU COULD GO TO SLEEP AND NOT WAKE UP?: NO
6. HAVE YOU EVER DONE ANYTHING, STARTED TO DO ANYTHING, OR PREPARED TO DO ANYTHING TO END YOUR LIFE?: NO
2. HAVE YOU ACTUALLY HAD ANY THOUGHTS OF KILLING YOURSELF IN THE PAST MONTH?: NO

## 2025-02-28 NOTE — ED TRIAGE NOTES
Pt comes in with three days of SOB. States some discomfort in his chest. No fevers or cough. Denies any lung hx. States exhaustion. Lower leg edema.     Triage Assessment (Adult)       Row Name 02/28/25 7543          Triage Assessment    Airway WDL WDL        Respiratory WDL    Respiratory WDL --  Shortness of breath with wheezing        Skin Circulation/Temperature WDL    Skin Circulation/Temperature WDL WDL        Cardiac WDL    Cardiac WDL WDL        Peripheral/Neurovascular WDL    Peripheral Neurovascular WDL WDL        Cognitive/Neuro/Behavioral WDL    Cognitive/Neuro/Behavioral WDL WDL

## 2025-03-01 ENCOUNTER — APPOINTMENT (OUTPATIENT)
Dept: CARDIOLOGY | Facility: CLINIC | Age: 89
DRG: 280 | End: 2025-03-01
Attending: PHYSICIAN ASSISTANT
Payer: COMMERCIAL

## 2025-03-01 ENCOUNTER — APPOINTMENT (OUTPATIENT)
Dept: OCCUPATIONAL THERAPY | Facility: CLINIC | Age: 89
DRG: 280 | End: 2025-03-01
Attending: PHYSICIAN ASSISTANT
Payer: COMMERCIAL

## 2025-03-01 LAB
ANION GAP SERPL CALCULATED.3IONS-SCNC: 12 MMOL/L (ref 7–15)
BUN SERPL-MCNC: 34.3 MG/DL (ref 8–23)
CALCIUM SERPL-MCNC: 8.9 MG/DL (ref 8.8–10.4)
CHLORIDE SERPL-SCNC: 106 MMOL/L (ref 98–107)
CREAT SERPL-MCNC: 1.39 MG/DL (ref 0.67–1.17)
EGFRCR SERPLBLD CKD-EPI 2021: 48 ML/MIN/1.73M2
ERYTHROCYTE [DISTWIDTH] IN BLOOD BY AUTOMATED COUNT: 13.6 % (ref 10–15)
GLUCOSE SERPL-MCNC: 105 MG/DL (ref 70–99)
HCO3 SERPL-SCNC: 24 MMOL/L (ref 22–29)
HCT VFR BLD AUTO: 39.7 % (ref 40–53)
HGB BLD-MCNC: 13.4 G/DL (ref 13.3–17.7)
LVEF ECHO: NORMAL
MAGNESIUM SERPL-MCNC: 1.9 MG/DL (ref 1.7–2.3)
MCH RBC QN AUTO: 32.2 PG (ref 26.5–33)
MCHC RBC AUTO-ENTMCNC: 33.8 G/DL (ref 31.5–36.5)
MCV RBC AUTO: 95 FL (ref 78–100)
PLAT MORPH BLD: NORMAL
PLATELET # BLD AUTO: 206 10E3/UL (ref 150–450)
POTASSIUM SERPL-SCNC: 4.5 MMOL/L (ref 3.4–5.3)
RBC # BLD AUTO: 4.16 10E6/UL (ref 4.4–5.9)
RBC MORPH BLD: NORMAL
SODIUM SERPL-SCNC: 142 MMOL/L (ref 135–145)
WBC # BLD AUTO: 6 10E3/UL (ref 4–11)

## 2025-03-01 PROCEDURE — 99223 1ST HOSP IP/OBS HIGH 75: CPT | Performed by: INTERNAL MEDICINE

## 2025-03-01 PROCEDURE — 250N000013 HC RX MED GY IP 250 OP 250 PS 637: Performed by: PHYSICIAN ASSISTANT

## 2025-03-01 PROCEDURE — 93306 TTE W/DOPPLER COMPLETE: CPT | Mod: 26 | Performed by: INTERNAL MEDICINE

## 2025-03-01 PROCEDURE — 85014 HEMATOCRIT: CPT | Performed by: PHYSICIAN ASSISTANT

## 2025-03-01 PROCEDURE — 97110 THERAPEUTIC EXERCISES: CPT | Mod: GO | Performed by: OCCUPATIONAL THERAPIST

## 2025-03-01 PROCEDURE — 83735 ASSAY OF MAGNESIUM: CPT | Performed by: PHYSICIAN ASSISTANT

## 2025-03-01 PROCEDURE — 82374 ASSAY BLOOD CARBON DIOXIDE: CPT | Performed by: PHYSICIAN ASSISTANT

## 2025-03-01 PROCEDURE — 80048 BASIC METABOLIC PNL TOTAL CA: CPT | Performed by: PHYSICIAN ASSISTANT

## 2025-03-01 PROCEDURE — 97535 SELF CARE MNGMENT TRAINING: CPT | Mod: GO | Performed by: OCCUPATIONAL THERAPIST

## 2025-03-01 PROCEDURE — 250N000011 HC RX IP 250 OP 636: Performed by: HOSPITALIST

## 2025-03-01 PROCEDURE — 999N000208 ECHOCARDIOGRAM COMPLETE

## 2025-03-01 PROCEDURE — 36415 COLL VENOUS BLD VENIPUNCTURE: CPT | Performed by: PHYSICIAN ASSISTANT

## 2025-03-01 PROCEDURE — 99233 SBSQ HOSP IP/OBS HIGH 50: CPT | Performed by: INTERNAL MEDICINE

## 2025-03-01 PROCEDURE — 210N000002 HC R&B HEART CARE

## 2025-03-01 PROCEDURE — 85048 AUTOMATED LEUKOCYTE COUNT: CPT | Performed by: PHYSICIAN ASSISTANT

## 2025-03-01 PROCEDURE — 255N000002 HC RX 255 OP 636: Performed by: PHYSICIAN ASSISTANT

## 2025-03-01 PROCEDURE — 97165 OT EVAL LOW COMPLEX 30 MIN: CPT | Mod: GO | Performed by: OCCUPATIONAL THERAPIST

## 2025-03-01 RX ORDER — FUROSEMIDE 10 MG/ML
40 INJECTION INTRAMUSCULAR; INTRAVENOUS 2 TIMES DAILY
Status: DISCONTINUED | OUTPATIENT
Start: 2025-03-01 | End: 2025-03-01

## 2025-03-01 RX ORDER — FUROSEMIDE 10 MG/ML
40 INJECTION INTRAMUSCULAR; INTRAVENOUS 2 TIMES DAILY
Status: DISCONTINUED | OUTPATIENT
Start: 2025-03-01 | End: 2025-03-02

## 2025-03-01 RX ADMIN — FUROSEMIDE 40 MG: 10 INJECTION, SOLUTION INTRAVENOUS at 15:23

## 2025-03-01 RX ADMIN — RIVAROXABAN 20 MG: 20 TABLET, FILM COATED ORAL at 17:16

## 2025-03-01 RX ADMIN — LISINOPRIL 2.5 MG: 2.5 TABLET ORAL at 09:19

## 2025-03-01 RX ADMIN — FUROSEMIDE 40 MG: 10 INJECTION, SOLUTION INTRAVENOUS at 09:21

## 2025-03-01 RX ADMIN — PERFLUTREN 10 ML: 6.52 INJECTION, SUSPENSION INTRAVENOUS at 12:42

## 2025-03-01 ASSESSMENT — ACTIVITIES OF DAILY LIVING (ADL)
ADLS_ACUITY_SCORE: 51
ADLS_ACUITY_SCORE: 52
ADLS_ACUITY_SCORE: 51
ADLS_ACUITY_SCORE: 52
ADLS_ACUITY_SCORE: 51
ADLS_ACUITY_SCORE: 52
ADLS_ACUITY_SCORE: 51
ADLS_ACUITY_SCORE: 52
ADLS_ACUITY_SCORE: 51
ADLS_ACUITY_SCORE: 52
ADLS_ACUITY_SCORE: 51
ADLS_ACUITY_SCORE: 52
ADLS_ACUITY_SCORE: 51
ADLS_ACUITY_SCORE: 51

## 2025-03-01 NOTE — H&P
[facility]  History and Physical - Hospitalist Service       Date of Admission:  2/28/2025  PRIMARY CARE PROVIDER:    Hosea Lockett    Assessment & Plan   Jose Ray is a 89 year old male admitted on 2/28/2025 due to exacerbation of suspected new CHF.      Past medical history significant for Chronic anticoagulation therapy in the setting of historic DVT, CKD stage IIIa, HTN, Chronic lower extremity edema, History of prostate cancer.    Patient presented to the ED due to shortness of breath and some slight chest discomfort.  Patient reported a 3-week history of shortness of breath with increasing leg swelling.  Patient's spouse indicated that for the past 3 days his breathing has worsened and he has developed a notable wheeze.  Patient has described taking prolonged periods of rest (hours) in order to regain his breathing.    Work-up in the ED included a CMP that revealed a BUN of 36.6, creatinine of 1.4 with a GFR 48 and glucose of 148 otherwise unremarkable.  CBC with differential revealed an RBC count of 4.36 otherwise unremarkable.  D-dimer was negative at 0.38.  Respiratory viral PCR panel was all negative.  Troponin T was elevated at 53 and improved to 50 upon repeat.  ProBNP is elevated at 13,353.  EKG with first-degree AV block.  Two-view chest x-ray revealed a mildly enlarged cardiac silhouette with pulmonary vascular congestion and basilar predominant interstitial prominence suggestive of interstitial edema with small bilateral pleural effusions noted.    IV Lasix 60 mg has been ordered in the ED but yet to be administered.    Acute exacerbation of suspected new Congestive Heart Failure  Pulmonary edema  Bilateral pleural effusions  Acute on chronic lower extremity edema  *No previous ECHO's.    - Cardiology consult requested.    - IV diuretics (40 mg BID).   - Start low dose lisinopril 2.5 mg/d on 3/1 with hold parameters.    - Hold off on beta blocker due to fluid  overload.    - ECHO ordered.  - Daily standing weights.    - Strict I/O monitoring.    - Cardiac combination diet with fluid restriction.    - PT/OT consults requested.    - Check A1c and TSH/Free T4.      NORBERT  CKD stage IIIa  *Baseline creatinine between 1.1-1.2.  Suspect worsening likely due to cardiorenal syndrome.    - BMP ordered daily with ongoing diuresis.      Suspected type 2 MI, demand ischemia  *Suspect secondary to demand due to exacerbation of heart failure.  - Monitor on telemetry.    - ECHO ordered.    - Continued on PTA DOAC.      Chronic anticoagulation therapy in the setting of historic DVT  - Resumed on PTA Xarelto 20 mg nightly with supper.      HTN  *Noted on chart review.  Patient does not appear to be on any medications PTA.      History of prostate cancer  *Noted on chart review and s/p prostatectomy.        Clinically Significant Risk Factors Present on Admission                # Drug Induced Coagulation Defect: home medication list includes an anticoagulant medication    # Hypertension: Noted on problem list       Diet: Combination cardiac diet  DVT Prophylaxis: DOAC  Lunsford Catheter: Not present  Lines: None     Cardiac Monitoring: ORDERED  Code Status: FULL CODE  Ambulation: Does not require the use of cane/walker.         Disposition Plan   Inpatient status.  Anticipate greater than 2 evening hospitalization while undergoing continued work-up/management of exacerbation of suspected new CHF.      Medically Ready for Discharge: Anticipated in 2-4 Days    The patient's care was discussed with the Patient, Patient's Family, and Dr. Loza .  Reviewed ED notes.      The patient has been discussed with Dr. Quinteros, who agrees with the assessment and plan at this time.    Maoy Rodriguez PA-C  [facility]  Securely message with the Vocera Web Console (learn more here)    ______________________________________________________________________    Chief Complaint    Worsening shortness of breath and lower extremity edema    History is obtained from Dr. Loza, the patient and EMR.      History of Present Illness   Jose Ray is a 89 year old male admitted on 2/28/2025 due to exacerbation of suspected new CHF.      Past medical history significant for Chronic anticoagulation therapy in the setting of historic DVT, CKD stage IIIa, HTN, Glaucoma, Chronic lower extremity edema, History of prostate cancer.    Patient presented to the ED due to shortness of breath and some slight chest discomfort.  Patient reported a 3-week history of shortness of breath with increasing leg swelling.  Patient's spouse indicated that for the past 3 days his breathing has worsened and he has developed a notable wheeze.  Patient has described taking prolonged periods of rest (hours) in order to regain his breathing.    Work-up in the ED included a CMP that revealed a BUN of 36.6, creatinine of 1.4 with a GFR 48 and glucose of 148 otherwise unremarkable.  CBC with differential revealed an RBC count of 4.36 otherwise unremarkable.  D-dimer was negative at 0.38.  Respiratory viral PCR panel was all negative.  Troponin T was elevated at 53 and improved to 50 upon repeat.  ProBNP is elevated at 13,353.  EKG with first-degree AV block.  Two-view chest x-ray revealed a mildly enlarged cardiac silhouette with pulmonary vascular congestion and basilar predominant interstitial prominence suggestive of interstitial edema with small bilateral pleural effusions noted.    IV Lasix 60 mg has been ordered in the ED but yet to be administered.    Patient was seen in the ED where he was resting comfortably on the gurney upon arrival.  Patient's wife was present in the room.  Briefly reviewed events that led to patient's presentation to the ED and discussed completed workup here in the ED.  Reviewed overall plan regarding admission to the hospital and overall workup that will be continuing as well as management  with IV diuretics.    Upon questioning, patient has been experiencing increased fatigue and weakness that seems to be associated with activity.  He described having some chest discomfort that seems to coincide with his difficulty breathing.  He described being short of breath and reported it felt as though he needed to come up for air after activities.  He has had worsening swelling of his lower extremities and just developed a wheeze within the last 24 hours.  Patient's wife indicated that she has not been happy/pleased with his pulse.  He has had a decrease in appetite and slower speech.  He also has a remote history of seizures that occurred over 60 years ago that was service related and occurred after striking his head.    Discussed and reviewed CODE STATUS and patient elected to be full code.      Past Medical History    I have reviewed this patient's medical history and updated it with pertinent information if needed.   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    Hx of colonoscopy 2011    nl    Hydronephrosis of left kidney 2010    seen on above ct    Nephrolithiasis 2010    seen on ct, hydronephrosis seen as well; had richy and hydro 6/21, stenting done.     Prostate cancer (H) 2008    had surgeryZohra    Skin cancer, basal cell 2002, 2008    had xrt 2002, mohs surgery 2008    Thrombophlebitis 1985   Chronic anticoagulation therapy in the setting of historic DVT, CKD stage IIIa, HTN, Glaucoma, Chronic lower extremity edema, History of prostate cancer.    Prior to Admission Medications   Prior to Admission Medications   Prescriptions Last Dose Informant Patient Reported? Taking?   Cholecalciferol (VITAMIN D PO)  Spouse/Significant Other Yes No   Sig: Take 1,000 Units by mouth daily    MULTIPLE VITAMIN PO   Yes No   Sig: Take 1 tablet by mouth every morning   Magnesium 500 MG CAPS   Yes No   ascorbic acid 1000 MG TABS tablet  Spouse/Significant Other Yes No   Sig: Take 1,000 mg by mouth 2 times daily   dorzolamide-timolol PF (COSOPT) 22.3-6.8 MG/ML opthalmic solution  Spouse/Significant Other Yes No   Sig: Place 1 drop into both eyes 2 times daily   Patient not taking: Reported on 2/23/2025   ferrous sulfate (FEROSUL) 325 (65 Fe) MG tablet   Yes No   Sig: Take 325 mg by mouth daily (with breakfast)   latanoprost (XALATAN) 0.005 % ophthalmic solution  Spouse/Significant Other Yes No   Sig: Place 1 drop into both eyes daily    Patient not taking: Reported on 2/23/2025   rivaroxaban ANTICOAGULANT (XARELTO ANTICOAGULANT) 20 MG TABS tablet   No No   Sig: TAKE 1 TABLET (20 MG) BY MOUTH DAILY (WITH DINNER)   triamcinolone (KENALOG) 0.1 % external cream  Spouse/Significant Other Yes No   Sig: Apply topically daily as needed for irritation (Back)    vitamin B complex with vitamin C (STRESS TAB) tablet  Spouse/Significant Other Yes No   Sig: Take 1 tablet by mouth daily   zinc gluconate 50 MG tablet  Spouse/Significant Other Yes No   Sig: Take 50 mg by mouth daily      Facility-Administered Medications: None     Allergies   Allergies   Allergen Reactions    Cat Hair [Cats]     Amoxicillin Rash       SOCIAL HISTORY:  [place] [facility]  He has no history of current use of tobacco use.  He consumes alcohol maybe twice a week with up to 2 beverages per setting.  He does not use recreational drugs.  He does not utilize a cane/walker/supplemental oxygen/CPAP machine.    Physical Exam   Vital Signs: Temp: 97.2  F (36.2  C) Temp src: Temporal BP: (!) 135/103 Pulse: 86   Resp: 22 SpO2: 96 % O2 Device: None (Room air)    Weight: 0 lbs 0 oz    Constitutional: Awake, alert, cooperative, no apparent distress.    ENT: Normocephalic, without obvious abnormality, atraumatic, oral pharynx with moist mucus membranes.  Eyes extra occular movements intact.  Normal sclera.    Neck: Supple, symmetrical, trachea midline, no adenopathy.  Pulmonary: No increased work of breathing, fair air exchange but diminished at the bases, clear to auscultation bilaterally, no crackles or wheezing.  Cardiovascular: Regular rate and rhythm, normal S1 and S2, no S3 or S4, and no murmur noted.  GI: Normal bowel sounds, soft, non-distended, non-tender.    Skin/Integumen: Visualized skin appeared clear.  Neuro: CN II-XII grossly intact.  Upper and lower extremities strength, coordination and sensation intact bilaterally.    Psych:  Alert and oriented x 3. Normal affect.  Extremities: Bilateral pitting lower extremity edema noted up to at least the knees, and calves are non-tender to palpation bilaterally.     Medical Decision Making       Please see A&P for additional details of medical decision making.  75 MINUTES SPENT BY ME on the date of service doing chart review, history, exam, documentation & further activities per the note.       Data   Data reviewed today: I reviewed all medications, new labs and imaging results over the last 24 hours. I personally reviewed the EKG tracing showing sinus rhythm with 1st degree AV block .      I have personally reviewed the following data over the past 24 hrs:    7.1  \   14.2   / 213     144 107 36.6 (H) /  148 (H)   4.6 24 1.40 (H) \      ALT: 42 AST: 27 AP: 94 TBILI: 0.9   ALB: 4.0 TOT PROTEIN: 6.6 LIPASE: N/A     Trop: 50 (H) BNP: 13,353 (H)     INR:  N/A PTT:  N/A   D-dimer:  0.38 Fibrinogen:  N/A       Imaging results reviewed over the past 24 hrs:   Recent Results (from the past 24 hours)   XR Chest 2 Views    Narrative    EXAM: XR CHEST 2 VIEWS  LOCATION: [facility]  DATE: 2/28/2025    INDICATION: shortness of breath  COMPARISON: None.      Impression    IMPRESSION: Cardiac silhouette is mildly enlarged. Pulmonary vascular congestion. Basilar predominant interstitial prominence suggestive of interstitial edema. Small bilateral pleural effusions.

## 2025-03-01 NOTE — PROGRESS NOTES
A&O x 4, VSS on RA, denied pain. Up x SBA to bathroom, external catheter at bedtime. IV lasix twice per day. Voiding well.  Tele SR with 1st degree AVB. And occasional PVC. Plan ECHO and cardiologist consult. Continue plan of care.

## 2025-03-01 NOTE — PROGRESS NOTES
03/01/25 1435   Appointment Info   Signing Clinician's Name / Credentials (OT) Monse Host, OTR/L   Living Environment   People in Home spouse   Current Living Arrangements house   Home Accessibility stairs to enter home;stairs within home   Number of Stairs, Main Entrance 1   Number of Stairs, Within Home, Primary greater than 10 stairs   Stair Railings, Within Home, Primary railings safe and in good condition   Self-Care   Usual Activity Tolerance good   Current Activity Tolerance moderate   Equipment Currently Used at Home none   Fall history within last six months no   Activity/Exercise/Self-Care Comment Independent w/ ADLs. Shares IADL tasks w/ spouse. Hires out yardwork and home maintenance tasks.   General Information   Onset of Illness/Injury or Date of Surgery 02/28/25   Referring Physician Mayo Rodriguez PA-C   Additional Occupational Profile Info/Pertinent History of Current Problem Admit w/ HF   Existing Precautions/Restrictions cardiac   Cognitive Status Examination   Orientation Status orientation to person, place and time   Pain Assessment   Patient Currently in Pain No   Bed Mobility   Bed Mobility No deficits identified   Transfers   Transfers sit-stand transfer   Sit-Stand Transfer   Sit-Stand [place] (Transfers) contact guard   Activities of Daily Living   BADL Assessment/Intervention lower body dressing   Lower Body Dressing Assessment/Training   [place] Level (Lower Body Dressing) supervision   Clinical Impression   Criteria for Skilled Therapeutic Interventions Met (OT) Yes, treatment indicated   OT Diagnosis decreased activity jillian for ADLs/IADLs   OT Problem List-Impairments impacting ADL problems related to;activity tolerance impaired;mobility   Assessment of Occupational Performance 1-3 Performance Deficits   Identified Performance Deficits activity jillian, functional mobility   Planned Therapy Interventions (OT) ADL retraining;risk factor education;progressive  activity/exercise;home program guidelines;transfer training   Clinical Decision Making Complexity (OT) problem focused assessment/low complexity   Risk & Benefits of therapy have been explained patient   OT Total Evaluation Time   OT Eval, Low Complexity Minutes (90110) 10   OT Goals   Therapy Frequency (OT) Daily   OT Predicted Duration/Target Date for Goal Attainment 03/07/25   OT Goals Cardiac Phase 1   OT: Understanding of cardiac education to maximize quality of life, condition management, and health outcomes Patient;Verbalize   OT: Perform aerobic activity with stable cardiovascular response continuous;ambulation;15 minutes   OT: Functional/aerobic ambulation tolerance with stable cardiovascular response in order to return to home and community environment Independent   OT: Navigation of stairs simulating home set up with stable cardiovascular response in order to return to home and community environment Independent;Greater than 10 stairs   Self-Care/Home Management   Self-Care/Home Mgmt/ADL, Compensatory, Meal Prep Minutes (53873) 13   Treatment Detail/Skilled Intervention Initiated ed w/ pt on HF packet. See details below. Pt very engaged and receptive to ed, asking appropriate questions throughout. Reports spouse mostly meal preps and they recently started using [employer]. Ed on reading labels for sodium content/service size. Handout given for pt to review. Pt verbalized understanding of edu.   Therapeutic Procedures/Exercise   Therapeutic Procedure: strength, endurance, ROM, flexibillity minutes (55571) 11   Symptoms Noted During/After Treatment fatigue   Treatment Detail/Skilled Intervention see below   Treatment Time Includes (CR Only) See specific exercise details intervention group(s);Monitoring of vital signs (see vital signs flowsheet for details)   Ambulation   Workload 500   Duration (minutes) 9 minutes   Effort Scale 2   Symptoms Fatigue;Dyspnea   Cardiovascular Response Normal   Exercise  Details CGA, no AD. slight unsteadiness noted. x2 standing rest periods needed d/t fatigue   Vital Signs Details see VS flowsheet   Cardiac Education   Education Provided Daily weights;Diagnosis;Diet;Stop light tool;Signs and symptoms   Education Packet Given to Patient Yes   All Patient Education Handouts Reviewed with Patient and/or Family No   OT Discharge Planning   OT Plan cont timed amb, HF ed. stairs   OT Discharge Recommendation (DC Rec) home with assist   OT Rationale for DC Rec Pt slightly below baseline. Limited by decreased activity jillian, shortness of breath. Anticipate w/ medical management and IP therapy, pt will be able to d/c home w/ assist from spouse.   OT Brief overview of current status Goals of therapy will be to address safe mobility and make recs for d/c to next level of care. Pt and RN will continue to follow all falls risk precautions as documented by RN staff while hospitalized   OT Total Distance Amb During Session (feet) 500   Total Session Time   Timed Code Treatment Minutes 24   Total Session Time (sum of timed and untimed services) 34

## 2025-03-01 NOTE — PROGRESS NOTES
RECEIVING UNIT ED HANDOFF REVIEW    ED Nurse Handoff Report was reviewed by: Phan Velásquez RN on February 28, 2025 at 8:41 PM

## 2025-03-01 NOTE — ED PROVIDER NOTES
Emergency Department Note      History of Present Illness     Chief Complaint   Shortness of Breath    HPI   Jose Ray is a 89 year old male on Xarelto with a history of DVT, hypertension, and prostate cancer who presents with worsening shortness of breath and leg swelling. He has had shortness of breath for three weeks. His spouse notes three days ago his shortness of breath began worsening and he developed wheezing. He has more trouble doing is daily activities, such as working on his taxes or exerting himself. He notes it takes him hours to rest and to get his breathing back to normal. He has some intermittent epigastric pain as well as a cough. He denies radiation of pain to his back, neck, or jaw. His spouse adds he has had edema for years but it has been worsening. He denies nausea or diaphoresis. He can lie flat and sleep through the night without shortness of breath. Pain does not worsen with eating. No chest pain, fever, chills, rhinorrhea, or sore throat. He denies missing his blood thinners.     Independent Historian   Wife as detailed above.    Review of External Notes   Office visit from 2/232025 reviewed where he was seen for shortness of breath. Nurse triage note reviewed from today.    Past Medical History     Medical History and Problem List   Acute renal failure with tubular necrosis  Adenomatous colon polyp  Basal cell carcinoma   Complex renal cyst  Diverticular hemorrhage  DVT  Eczema  Gallstone  Generalized osteoarthrosis  Hypertension  Hydronephrosis of left kidney  Hemorrhoids   Nephrolithiasis  Prostate cancer  Skin cancer, basal cell  Thrombophlebitis     Medications   Ascorbic acid  Cholecalciferol   Ferrous sulfate  Magnesium   Rivaroxaban   Zinc gluconate     Surgical History   Cataract extraction  Prostatectomy  Hernia repair X2  Laser holmium lithotripsy ureter(s), insert stent, combined, right X2  Mohs surgery  Vein stripping     Physical Exam     Patient Vitals for the past  24 hrs:   BP Temp Temp src Pulse Resp SpO2   02/28/25 1838 (!) 135/103 -- -- 86 22 96 %   02/28/25 1616 (!) 141/96 97.2  F (36.2  C) Temporal 87 22 97 %     Physical Exam  Constitutional: Well developed, forlorn, nontox appearance  Head: Atraumatic.   Neck:  no stridor  Eyes: no scleral icterus  Cardiovascular: RRR, 2+ bilat radial pulses  Pulmonary/Chest: nml resp effort, Clear BS bilat, mildly diminished breath sounds in bilateral bases  Abdominal: ND, soft, NT, no rebound or guarding   Ext: Warm, well perfused, bilateral lower extremity pitting edema  Neurological: A&O, symmetric facies, moves ext x4  Skin: Skin is warm and dry.   Psychiatric: Behavior is normal. Thought content normal.   Nursing note and vitals reviewed.      Diagnostics     Lab Results   Labs Ordered and Resulted from Time of ED Arrival to Time of ED Departure   COMPREHENSIVE METABOLIC PANEL - Abnormal       Result Value    Sodium 144      Potassium 4.6      Carbon Dioxide (CO2) 24      Anion Gap 13      Urea Nitrogen 36.6 (*)     Creatinine 1.40 (*)     GFR Estimate 48 (*)     Calcium 9.1      Chloride 107      Glucose 148 (*)     Alkaline Phosphatase 94      AST 27      ALT 42      Protein Total 6.6      Albumin 4.0      Bilirubin Total 0.9     TROPONIN T, HIGH SENSITIVITY - Abnormal    Troponin T, High Sensitivity 53 (*)    NT PROBNP INPATIENT - Abnormal    N terminal Pro BNP Inpatient 13,353 (*)    CBC WITH PLATELETS AND DIFFERENTIAL - Abnormal    WBC Count 7.1      RBC Count 4.36 (*)     Hemoglobin 14.2      Hematocrit 43.0      MCV 99      MCH 32.6      MCHC 33.0      RDW 13.9      Platelet Count 213      % Neutrophils 70      % Lymphocytes 19      % Monocytes 9      % Eosinophils 2      % Basophils 0      % Immature Granulocytes 0      NRBCs per 100 WBC 0      Absolute Neutrophils 5.0      Absolute Lymphocytes 1.4      Absolute Monocytes 0.6      Absolute Eosinophils 0.1      Absolute Basophils 0.0      Absolute Immature Granulocytes 0.0       Absolute NRBCs 0.0     TROPONIN T, HIGH SENSITIVITY - Abnormal    Troponin T, High Sensitivity 50 (*)    INFLUENZA A/B, RSV AND SARS-COV2 PCR - Normal    Influenza A PCR Negative      Influenza B PCR Negative      RSV PCR Negative      SARS CoV2 PCR Negative     D DIMER QUANTITATIVE - Normal    D-Dimer Quantitative 0.38       Imaging   XR Chest 2 Views   Final Result   IMPRESSION: Cardiac silhouette is mildly enlarged. Pulmonary vascular congestion. Basilar predominant interstitial prominence suggestive of interstitial edema. Small bilateral pleural effusions.        EKG   ECG taken at 1625, ECG read at 1820  Sinus rhythm with 1st degree AV block  Nonspecific ST and T wave abnormality    No significant change as compared to prior, dated 7/2/2021.  Rate 94 bpm. NM interval 250 ms. QRS duration 78 ms. QT/QTc 348/435 ms. P-R-T axes 92 31 110.    Independent Interpretation   CXR: No pneumothorax., present bilateral pleural effusions  EKG significant for sinus rhythm with first-degree AV block, no acute ischemic findings    ED Course      Medications Administered   Medications   furosemide (LASIX) injection 60 mg (60 mg Intravenous $Given 2/28/25 1952)     Discussion of Management   Admitting Hospitalist, Pepe Rodriguez PA-C, regarding the patient's history and presentation here in the emergency department who accepted the patient for admission on behalf of Dr. Quinteros.     ED Course   ED Course as of 02/28/25 2004 Fri Feb 28, 2025 1826 I obtained the patient's history and examined as noted above. He presents with his spouse, Usha.    1943 I consulted with Pepe Rodriguez PA-C, hospitalist, regarding the patient's history and presentation here in the emergency department who accepted the patient for admission on behalf of Dr. Quinteros.     Additional Documentation  Social Determinants of Health: Age increasing risk for presentation to the ED    Medical Decision Making / Diagnosis     CMS Diagnoses: None    MIPS        None    [facility]   Jose Ray is a 89 year old male presenting with shortness of breath    Differential diagnosis includes CHF exacerbation, ACS, PE with failure of anticoagulation, viral syndrome such as influenza or COVID.  EKG interpretation as noted above.  Chest x-ray demonstrated findings of venous congestion and pleural effusions.  Labs significant for elevated but downtrending troponin levels, elevated BNP, creatinine at baseline.  Presentation seems most consistent with CHF.  The patient was given Lasix as noted above.  Given no previous episodes of CHF, profoundly symptomatic with exertion/ambulation, the patient was admitted to the hospital service for further evaluation and management.  Patient is wife were counseled on the results, diagnosis and disposition prior to admission.  They are understanding agreeable to plan.  Patient subsequently admitted in guarded condition.    Disposition   The patient was admitted to the hospital.     Diagnosis     ICD-10-CM    1. Acute on chronic congestive heart failure, unspecified heart failure type (H)  I50.9          Scribe Disclosure:  Chelsea FISHER, am serving as a scribe at 6:33 PM on 2/28/2025 to document services personally performed by Ap Loza MD based on my observations and the provider's statements to me.         Ap Loza MD  02/28/25 6090

## 2025-03-01 NOTE — PROGRESS NOTES
[facility]    Hospitalist Progress Note    Date of Admission: 2/28/2025    Assessment & Plan   Jose Ray is a 89 year old male with PMHx of hypertension, stage III CKD, chronic LE edema, hx of DVT on chronic anticoagulation and hx of prostate cancer who was admitted on 2/28/2025 with suspected new onset CHF.     Acute exacerbation of suspected new CHF  Pulmonary edema, bilateral pleural effusions  Acute on chronic lower extremity edema  Suspected type 2 MI, demand ischemia dt CHF   Hypertension, noted on chart review, not on any antihypertensive meds at baseline  *Presented to ED for evaluation of 3 wk hx of worsening shortness of breath, worsening LE edema and slight chest discomfort. Per spouse, in recent days had developed an audible wheeze.   *In ED, was afebrile and VSS. O2 sats stable on RA. Labs notable for Cr 1.4, proBNP 13k, trops elevated but trend was flat (53 -- 50); CBC okay. TSH okay. Viral panel neg.  EKG showed first degree AVB, no ischemic changes. CXR showed pulmonary vascular congestion, interstitial edema and small bilateral pleural effusions.  No hx of CHF or prior echos available to review. Diuresis was initiated with IV Lasix in the ED, started on low dose lisinopril.   -- cont diuresis with Lasix 40mg IV BID; monitor Is/Os, daily wts  -- cont lisinopril 2.5mg daily  -- echo ordered  -- cardiology consulted    [place] on stage IIIa CKD  CKD stage III  *Baseline Cr 1.1-1.2.  Cr 1.4 on admission this stay. Suspect worsening likely due to cardiorenal syndrome.    -- daily BMP while diuresing    Prediabetes  *A1c 6.1 this stay. Pending findings on echo, consider initiation of Jardiance for GDMT  *Will need OP follow up with PCP for ongoing monitoring    Hx of prior DVT, on chronic anticoagulation  *Chronic and stable on Xarelto, continued this stay.     History of prostate cancer,s/p prostatectomy  *Noted. No specific concerns this stay.      FEN: no IVFs, lytes  stable, cardiac diet  DVT Prophylaxis: DOAC as above  Code Status: Full Code    Disposition: Anticipate discharge in 2-3d, pending response to diuresis, findings on echo and stable labs. PT/OT consulted for discharge planning.     Medically Ready for Discharge: Anticipated in 2-4 Days    Updated patient's spouse Usha this morning. Questions answered.     Vy Cate Ana Cristina, DO    Clinically Significant Risk Factors Present on Admission                # Drug Induced Coagulation Defect: home medication list includes an anticoagulant medication    # Hypertension: Noted on problem list                        Medical Decision Making       Please see A&P for additional details of medical decision making.         Interval History   Chart reviewed. Seen this afternoon.  Feeling a little better today.  No specific complaints.  Denies chest pain, work of breathing, cough, abdominal pain, nausea or vomiting.  Seemed eager to know the plan for additional testing and medications.    -Data reviewed today: I reviewed all new labs and imaging results over the last 24 hours. I personally reviewed no images or EKG's today.    Physical Exam   Temp: 98  F (36.7  C) Temp src: Oral BP: 112/77 Pulse: 82   Resp: 17 SpO2: 95 % O2 Device: None (Room air)    Vitals:    03/01/25 0600   Weight: 69.3 kg (152 lb 11.2 oz)     Vital Signs with Ranges  Temp:  [96.7  F (35.9  C)-98.1  F (36.7  C)] 98  F (36.7  C)  Pulse:  [82-97] 82  Resp:  [15-22] 17  BP: (112-149)/() 112/77  SpO2:  [95 %-97 %] 95 %  I/O last 3 completed shifts:  In: 450 [P.O.:450]  Out: 2975 [Urine:2975]    Constitutional: Resting comfortably, alert and conversing appropriately, NAD  Respiratory: CTAB, no wheeze, no increased work of breathing or accessory muscle use  Cardiovascular: HRRR, no MGR, +LE edema to the shins  GI: S, NT, ND, +BS  Skin/Integumen: warm/dry  Other:      Medications   Current Facility-Administered Medications   Medication Dose Route Frequency  Provider Last Rate Last Admin    Patient is already receiving anticoagulation with heparin, enoxaparin (LOVENOX), warfarin (COUMADIN)  or other anticoagulant medication   Does not apply Continuous PRN Mayo Rodriguez PA-C        Reason beta blocker not prescribed   Other DOES NOT GO TO Mayo Mai PA-C         Current Facility-Administered Medications   Medication Dose Route Frequency Provider Last Rate Last Admin    furosemide (LASIX) injection 40 mg  40 mg Intravenous BID Tess Quinteros MD   40 mg at 03/01/25 0921    lisinopril (ZESTRIL) tablet 2.5 mg  2.5 mg Oral Daily Mayo Rodriguez PA-C   2.5 mg at 03/01/25 0919    rivaroxaban ANTICOAGULANT (XARELTO) tablet 20 mg  20 mg Oral Daily with supper Mayo Rodriguez PA-C   20 mg at 02/28/25 2224    sodium chloride (PF) 0.9% PF flush 3 mL  3 mL Intracatheter Q8H Mayo Rodriguez PA-C   3 mL at 03/01/25 0920       Data   Recent Labs   Lab 03/01/25  0555 02/28/25  1624   WBC 6.0 7.1   HGB 13.4 14.2   MCV 95 99    213    144   POTASSIUM 4.5 4.6   CHLORIDE 106 107   CO2 24 24   BUN 34.3* 36.6*   CR 1.39* 1.40*   ANIONGAP 12 13   REY 8.9 9.1   * 148*   ALBUMIN  --  4.0   PROTTOTAL  --  6.6   BILITOTAL  --  0.9   ALKPHOS  --  94   ALT  --  42   AST  --  27       Recent Results (from the past 24 hours)   XR Chest 2 Views    Narrative    EXAM: XR CHEST 2 VIEWS  LOCATION: [facility]  DATE: 2/28/2025    INDICATION: shortness of breath  COMPARISON: None.      Impression    IMPRESSION: Cardiac silhouette is mildly enlarged. Pulmonary vascular congestion. Basilar predominant interstitial prominence suggestive of interstitial edema. Small bilateral pleural effusions.

## 2025-03-01 NOTE — CONSULTS
Inpatient Cardiology Consultation.   [facility]  Date of Admission: 2/28/2025  Date of Consult: March 1, 2025      PRIMARY CARDIOLOGY TEAM:  New to cardiology.      DIAGNOSES/ASSESSMENT:    - Acute decompensated heart failure with ejection fraction pending.  - Chronic stage 3a kidney disease.  - Remote history of prostate cancer, status post-prostatectomy.  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  - Never tobacco use.  - No alcohol use.  - Normal BMI.    PLAN:    - Continue IV furosemide 40 mg BID; patient is having a good response.  - Continue low-dose lisinopril 2.5 mg daily. Unable to uptitrate due to soft blood pressure.  - Monitor renal function; likely to improve with heart failure treatment.  - Await transthoracic echocardiogram results.  - No coronary intervention planned at this time.  - No indication for IV heparin.  - Cardiology will follow.      Mindi Ballard MD, MD FACC  Cardiology    Total time today 80 minutes.  High complexity medical decision making and care coordination.      CODE STATUS:  Full Code      REASON FOR CONSULT:  89-year-old male with a new diagnosis of congestive heart failure.    HISTORY OF PRESENT ILLNESS:  Jose Ray is a 89 year old male, no prior cardiac history, on long-term rivaroxaban for prior DVT, remote history of prostate cancer status post prostatectomy, not on any other prescription medications other than rivaroxaban who presents with four weeks of progressive exertional dyspnea, lower extremity edema, fluid weight gain, and more recent orthopnea, classified as NYHA Class IV dyspnea.     In the ER, he was mildly hypertensive at 140/96 mmHg, in sinus rhythm at 90 bpm, with oxygen saturation at 97% on room air. His NT-proBNP was significantly elevated at 13,500. High sensitivity troponin T was minimally elevated in the 50s without an injury pattern. Hemoglobin was 14.2, liver enzymes were normal, serum albumin was 4.0, creatinine was  slightly elevated at 1.4 from a baseline of 1.2, BUN was 36, potassium was 4.6, sodium was 144, HbA1c was 6.1%, and TSH was normal. EKG showed sinus rhythm, no acute ischemic changes, with a first-degree AV block and a NH interval of 250 milliseconds. Chest X-ray indicated a normal cardiac silhouette, mild pulmonary edema, and small bilateral pleural effusions without pneumonia. A transthoracic echocardiogram is pending.    The patient was diuretic naive before starting furosemide 40 mg IV BID and has been started on lisinopril 2.5 mg daily and is diuresing well with improvement in symptoms.     Social History: The patient has no history of alcohol or tobacco use.    Family History: No family history of cardiovascular disease.    Physical Exam: The patient is diuresing well with symptomatic improvement. Blood pressure is 112/77 mmHg, oxygen saturation is 95%, and pulse is 80 bpm. Heart sounds are regular, no  murmur, jugular venous pressure is elevated, and there is significant bilateral lower extremity edema. Bibasilar crackles are present, apical impulse is undisplaced, and the abdomen is soft and non-tender. The patient is mentally alert and oriented X3.    REVIEW OF SYSTEMS:  A comprehensive 10 point review of systems was completed and the pertinent positives are documented in history of present illness.    FAMILY HISTORY:  Family History   Problem Relation Age of Onset    Hypertension Mother         dec.    Arthritis Mother     Gastrointestinal Disease Mother     Deep Vein Thrombosis Mother     Cancer Father         Lung    Other Cancer Father         Lung    Asthma Father     C.A.D. Paternal Grandmother         ?    Heart Disease Maternal Grandmother         ?    Hypertension Maternal Grandmother         dec.       MEDICATIONS:  Prior to Admission Medications   Prescriptions Last Dose Informant Patient Reported? Taking?   Cholecalciferol (VITAMIN D PO)  Spouse/Significant Other Yes Yes   Sig: Take 1,000 Units  by mouth daily    MAGNESIUM PO   Yes Yes   Sig: Take 2 capsules by mouth daily.   MULTIPLE VITAMIN PO   Yes Yes   Sig: Take 1 tablet by mouth every morning   ascorbic acid 1000 MG TABS tablet  Spouse/Significant Other Yes Yes   Sig: Take 1,000 mg by mouth daily.   ferrous sulfate (FEROSUL) 325 (65 Fe) MG tablet   Yes Yes   Sig: Take 325 mg by mouth daily (with breakfast)   rivaroxaban ANTICOAGULANT (XARELTO ANTICOAGULANT) 20 MG TABS tablet 2/27/2025 Evening  No Yes   Sig: TAKE 1 TABLET (20 MG) BY MOUTH DAILY (WITH DINNER)   triamcinolone (KENALOG) 0.1 % external cream  Spouse/Significant Other Yes Yes   Sig: Apply topically daily as needed for irritation (Back)    vitamin B complex with vitamin C (STRESS TAB) tablet  Spouse/Significant Other Yes Yes   Sig: Take 1 tablet by mouth daily   zinc gluconate 50 MG tablet  Spouse/Significant Other Yes Yes   Sig: Take 50 mg by mouth daily      Facility-Administered Medications: None       HOME MEDICATIONS:  Prior to Admission Medications   Prescriptions Last Dose Informant Patient Reported? Taking?   Cholecalciferol (VITAMIN D PO)  Spouse/Significant Other Yes Yes   Sig: Take 1,000 Units by mouth daily    MAGNESIUM PO   Yes Yes   Sig: Take 2 capsules by mouth daily.   MULTIPLE VITAMIN PO   Yes Yes   Sig: Take 1 tablet by mouth every morning   ascorbic acid 1000 MG TABS tablet  Spouse/Significant Other Yes Yes   Sig: Take 1,000 mg by mouth daily.   ferrous sulfate (FEROSUL) 325 (65 Fe) MG tablet   Yes Yes   Sig: Take 325 mg by mouth daily (with breakfast)   rivaroxaban ANTICOAGULANT (XARELTO ANTICOAGULANT) 20 MG TABS tablet 2/27/2025 Evening  No Yes   Sig: TAKE 1 TABLET (20 MG) BY MOUTH DAILY (WITH DINNER)   triamcinolone (KENALOG) 0.1 % external cream  Spouse/Significant Other Yes Yes   Sig: Apply topically daily as needed for irritation (Back)    vitamin B complex with vitamin C (STRESS TAB) tablet  Spouse/Significant Other Yes Yes   Sig: Take 1 tablet by mouth daily   zinc  gluconate 50 MG tablet  Spouse/Significant Other Yes Yes   Sig: Take 50 mg by mouth daily      Facility-Administered Medications: None       ALLERGIES:  Allergies   Allergen Reactions    Cat Hair [Cats]     Amoxicillin Rash       PAST MEDICAL HISTORY:  Past Medical History:   Diagnosis Date    Acute on chronic congestive heart failure, unspecified heart failure type (H) 2/28/2025    Acute renal failure with tubular necrosis 06/2021    due to renal stone and hydro    Adenomatous colon polyp 01/2019    Complex renal cyst 06/2014    seen on us done for htn, fu 6 months, fu done 8/15 and stable; fu 6/18 simple cyst, a bit larger    Diverticular hemorrhage 01/2019    colonic, hosp fsd    DVT, lower extremity, distal, acute, right (H) 02/2020, 1996    gastrocnemius vein in 2020, not sure which vein in 1996, factor 5 leiden neg 2020; lifelong therapy    Eczema     Gallstone 2010    seen on ct for renal stone    Generalized osteoarthrosis, unspecified site     HTN (hypertension) 06/2014    us done and no hydro but renal cyst, had edema with norvasc 5mg, added hctz and stopped norvasc 11/20; stopped norvasc 5/24 due to light headed spell.    Hx of colonoscopy 2011    nl    Hydronephrosis of left kidney 2010    seen on above ct    Nephrolithiasis 2010    seen on ct, hydronephrosis seen as well; had richy and hydro 6/21, stenting done.    Prostate cancer (H) 2008    had surgery, Zohra Martinez    Skin cancer, basal cell 2002, 2008    had xrt 2002, mohs surgery 2008    Thrombophlebitis 1985       PAST SURGICAL HISTORY:  Past Surgical History:   Procedure Laterality Date    CATARACT EXTRACTION  09/2024    CIRCUMCISION  03/12/2013    Procedure: CIRCUMCISION;  CIRCUMCISION;  Surgeon: Stevenson Martinez MD;  Location: [facility]    [facility]  2011; [facility]    GENITOURINARY SURGERY      prostatectomy    HERNIA REPAIR  1961    Hernia    LASER HOLMIUM LITHOTRIPSY URETER(S), INSERT STENT, COMBINED Right 06/13/2021     Procedure: Cystoscopy, right ureter stent placement;  Surgeon: Rishi Vincent MD;  Location:  OR    LASER HOLMIUM LITHOTRIPSY URETER(S), INSERT STENT, COMBINED Right 07/02/2021    Procedure: CYSTOSCOPY, RIGHT URETEROSCOPY, HOLMIUM LASER LITHOTRIPSY, RIGHT URETERAL STENT EXCHANGE;  Surgeon: Stevenson Martinez MD;  Location:  OR    mohs surgery  2008, 2011    basal cell    VASCULAR SURGERY  1985    vein stripping both legs some    [facility] NONSPECIFIC PROCEDURE  13, 35    HERNIORRHAPHY    [facility] NONSPECIFIC PROCEDURE  1985    VEIN STRIPPING       SOCIAL HISTORY:   Jose Ray  reports that he has never smoked. He has never used smokeless tobacco. He reports current alcohol use. He reports that he does not use drugs.      PHYSICAL EXAMINATION:  Temp: 98  F (36.7  C) Temp src: Oral BP: 112/77 Pulse: 82   Resp: 17 SpO2: 95 % O2 Device: None (Room air)    02/24 0700 - 03/01 0659  In: 450 [P.O.:450]  Out: 2975 [Urine:2975]  Net: -2525  Vitals:    03/01/25 0600   Weight: 69.3 kg (152 lb 11.2 oz)       Clinically Significant Risk Factors Present on Admission                # Drug Induced Coagulation Defect: home medication list includes an anticoagulant medication    # Hypertension: Noted on problem list                 Diastolic acute  CKD POA List: Stage 3a (GFR 45-59)      Mindi Ballard MD, MD    This note was completed in part using dictation via the Dragon voice recognition software. Some word and grammatical errors may occur and must be interpreted in the appropriate clinical context. If there are any questions pertaining to this issue, please contact me for further clarification.

## 2025-03-01 NOTE — PHARMACY-ADMISSION MEDICATION HISTORY
Pharmacist Admission Medication History    Admission medication history is complete. The information provided in this note is only as accurate as the sources available at the time of the update.    Information Source(s): Patient via in-person. Patient's wife Usha.    Pertinent Information: None    Changes made to PTA medication list:  Added: None  Deleted: Dorzolamide-Timolol, Latanoprost  Changed: Vitamin C    Allergies reviewed with patient and updates made in EHR: yes    Medication History Completed By: Deshaun Buitrago [facility] 2/28/2025 7:48 PM    PTA Med List   Medication Sig Last Dose/Taking    ascorbic acid 1000 MG TABS tablet Take 1,000 mg by mouth daily. Taking    Cholecalciferol (VITAMIN D PO) Take 1,000 Units by mouth daily  Taking    ferrous sulfate (FEROSUL) 325 (65 Fe) MG tablet Take 325 mg by mouth daily (with breakfast) Taking    MAGNESIUM PO Take 2 capsules by mouth daily. Taking    MULTIPLE VITAMIN PO Take 1 tablet by mouth every morning Taking    rivaroxaban ANTICOAGULANT (XARELTO ANTICOAGULANT) 20 MG TABS tablet TAKE 1 TABLET (20 MG) BY MOUTH DAILY (WITH DINNER) 2/27/2025 Evening    triamcinolone (KENALOG) 0.1 % external cream Apply topically daily as needed for irritation (Back)  Taking As Needed    vitamin B complex with vitamin C (STRESS TAB) tablet Take 1 tablet by mouth daily Taking    zinc gluconate 50 MG tablet Take 50 mg by mouth daily Taking        Medical Necessity Information: It is in your best interest to select a reason for this procedure from the list below. All of these items fulfill various CMS LCD requirements except the new and changing color options. Medical Necessity Clause: This procedure was medically necessary because the lesion that was treated was: Lab: [id number] Lab Facility: 0 Body Location Override (Optional - Billing Will Still Be Based On Selected Body Map Location If Applicable): right frontotemporal hairline Detail Level: Simple Was A Bandage Applied: Yes Size Of Lesion In Cm (Required): 0.7 X Size Of Lesion In Cm (Optional): 0.6 Depth Of Shave: dermis Biopsy Method: Dermablade Anesthesia Type: 1% lidocaine with epinephrine Anesthesia Volume In Cc: 0.5 Hemostasis: Drysol Wound Care: Petrolatum Render Path Notes In Note?: No Consent was obtained from the patient. The risks and benefits to therapy were discussed in detail. Specifically, the risks of infection, scarring, bleeding, prolonged wound healing, incomplete removal, allergy to anesthesia, nerve injury and recurrence were addressed. Prior to the procedure, the treatment site was clearly identified and confirmed by the patient. All components of Universal Protocol/PAUSE Rule completed. Post-Care Instructions: I reviewed with the patient in detail post-care instructions. Patient is to keep the biopsy site dry overnight, and then apply bacitracin twice daily until healed. Patient may apply hydrogen peroxide soaks to remove any crusting. Notification Instructions: Patient will be notified of pathology results. However, patient instructed to call the office if not contacted within 2 weeks. Billing Type: Third-Party Bill

## 2025-03-01 NOTE — ED NOTES
[facility]  ED Nurse Handoff Report    ED Chief complaint: Shortness of Breath      ED Diagnosis:   Final diagnoses:   Acute on chronic congestive heart failure, unspecified heart failure type (H)       Code Status: hospitalist to discuss    Allergies:   Allergies   Allergen Reactions    Cat Hair [Cats]     Amoxicillin Rash       Patient Story:  Jose Ray is a 89 year old male on Xarelto with a history of DVT, hypertension, and prostate cancer who presents with worsening shortness of breath and leg swelling. He has had shortness of breath for three weeks. His spouse notes three days ago his shortness of breath began worsening and he developed wheezing. He has more trouble doing is daily activities, such as working on his taxes or exerting himself. He notes it takes him hours to rest and to get his breathing back to normal. He has some intermittent epigastric pain as well as a cough. He denies radiation of pain to his back, neck, or jaw. His spouse adds he has had edema for years but it has been worsening. He denies nausea or diaphoresis. He can lie flat and sleep through the night without shortness of breath. Pain does not worsen with eating. No chest pain, fever, chills, rhinorrhea, or sore throat. He denies missing his blood thinners.     Focused Assessment:    A/Ox4. IND at baseline. Mild hypertension. HR . Tachypenic with intermittent wheezing. Dyspenic on exertion. BLE edema. +3    Labs Ordered and Resulted from Time of ED Arrival to Time of ED Departure   COMPREHENSIVE METABOLIC PANEL - Abnormal       Result Value    Sodium 144      Potassium 4.6      Carbon Dioxide (CO2) 24      Anion Gap 13      Urea Nitrogen 36.6 (*)     Creatinine 1.40 (*)     GFR Estimate 48 (*)     Calcium 9.1      Chloride 107      Glucose 148 (*)     Alkaline Phosphatase 94      AST 27      ALT 42      Protein Total 6.6      Albumin 4.0      Bilirubin Total 0.9     TROPONIN T, HIGH SENSITIVITY - Abnormal     Troponin T, High Sensitivity 53 (*)    NT PROBNP INPATIENT - Abnormal    N terminal Pro BNP Inpatient 13,353 (*)    CBC WITH PLATELETS AND DIFFERENTIAL - Abnormal    WBC Count 7.1      RBC Count 4.36 (*)     Hemoglobin 14.2      Hematocrit 43.0      MCV 99      MCH 32.6      MCHC 33.0      RDW 13.9      Platelet Count 213      % Neutrophils 70      % Lymphocytes 19      % Monocytes 9      % Eosinophils 2      % Basophils 0      % Immature Granulocytes 0      NRBCs per 100 WBC 0      Absolute Neutrophils 5.0      Absolute Lymphocytes 1.4      Absolute Monocytes 0.6      Absolute Eosinophils 0.1      Absolute Basophils 0.0      Absolute Immature Granulocytes 0.0      Absolute NRBCs 0.0     TROPONIN T, HIGH SENSITIVITY - Abnormal    Troponin T, High Sensitivity 50 (*)    INFLUENZA A/B, RSV AND SARS-COV2 PCR - Normal    Influenza A PCR Negative      Influenza B PCR Negative      RSV PCR Negative      SARS CoV2 PCR Negative     D DIMER QUANTITATIVE - Normal    D-Dimer Quantitative 0.38         XR Chest 2 Views   Final Result   IMPRESSION: Cardiac silhouette is mildly enlarged. Pulmonary vascular congestion. Basilar predominant interstitial prominence suggestive of interstitial edema. Small bilateral pleural effusions.            Treatments and/or interventions provided:    Medications   furosemide (LASIX) injection 60 mg (60 mg Intravenous $Given 2/28/25 1952)       Patient's response to treatments and/or interventions:  Remains stable    To be done/followed up on inpatient unit:   See any in-patient orders    Does this patient have any cognitive concerns?:  n/a    Activity level - Baseline/Home:    Independent    Activity Level - Current:    Independent    Patient's Preferred language: English     Needed?: No    Isolation: None  Infection: Not Applicable  Patient tested for COVID 19 prior to admission: NO    Bariatric?: No    Vital Signs:   Vitals:    02/28/25 1616 02/28/25 1838   BP: (!) 141/96 (!)  135/103   Pulse: 87 86   Resp: 22 22   Temp: 97.2  F (36.2  C)    TempSrc: Temporal    SpO2: 97% 96%       Cardiac Rhythm:     Was the PSS-3 completed:   Yes  What interventions are required if any?                 Family Comments: at bedside    For the majority of the shift this patient's behavior was Green.  Behavioral interventions performed were calm environment.    ED NURSE PHONE NUMBER: *[phone]

## 2025-03-02 ENCOUNTER — APPOINTMENT (OUTPATIENT)
Dept: OCCUPATIONAL THERAPY | Facility: CLINIC | Age: 89
DRG: 280 | End: 2025-03-02
Payer: COMMERCIAL

## 2025-03-02 LAB
ANION GAP SERPL CALCULATED.3IONS-SCNC: 10 MMOL/L (ref 7–15)
BUN SERPL-MCNC: 39 MG/DL (ref 8–23)
CALCIUM SERPL-MCNC: 9.1 MG/DL (ref 8.8–10.4)
CHLORIDE SERPL-SCNC: 104 MMOL/L (ref 98–107)
CREAT SERPL-MCNC: 1.72 MG/DL (ref 0.67–1.17)
EGFRCR SERPLBLD CKD-EPI 2021: 38 ML/MIN/1.73M2
GLUCOSE SERPL-MCNC: 92 MG/DL (ref 70–99)
HCO3 SERPL-SCNC: 29 MMOL/L (ref 22–29)
POTASSIUM SERPL-SCNC: 3.8 MMOL/L (ref 3.4–5.3)
SODIUM SERPL-SCNC: 143 MMOL/L (ref 135–145)

## 2025-03-02 PROCEDURE — 99232 SBSQ HOSP IP/OBS MODERATE 35: CPT | Performed by: INTERNAL MEDICINE

## 2025-03-02 PROCEDURE — 97535 SELF CARE MNGMENT TRAINING: CPT | Mod: GO | Performed by: OCCUPATIONAL THERAPIST

## 2025-03-02 PROCEDURE — 80048 BASIC METABOLIC PNL TOTAL CA: CPT | Performed by: INTERNAL MEDICINE

## 2025-03-02 PROCEDURE — 36415 COLL VENOUS BLD VENIPUNCTURE: CPT | Performed by: INTERNAL MEDICINE

## 2025-03-02 PROCEDURE — 97530 THERAPEUTIC ACTIVITIES: CPT | Mod: GO | Performed by: OCCUPATIONAL THERAPIST

## 2025-03-02 PROCEDURE — 99233 SBSQ HOSP IP/OBS HIGH 50: CPT | Performed by: INTERNAL MEDICINE

## 2025-03-02 PROCEDURE — 210N000002 HC R&B HEART CARE

## 2025-03-02 ASSESSMENT — ACTIVITIES OF DAILY LIVING (ADL)
ADLS_ACUITY_SCORE: 52
ADLS_ACUITY_SCORE: 31
ADLS_ACUITY_SCORE: 31
ADLS_ACUITY_SCORE: 52
ADLS_ACUITY_SCORE: 52
ADLS_ACUITY_SCORE: 31
ADLS_ACUITY_SCORE: 52
ADLS_ACUITY_SCORE: 52
ADLS_ACUITY_SCORE: 31
ADLS_ACUITY_SCORE: 52
ADLS_ACUITY_SCORE: 31
ADLS_ACUITY_SCORE: 52
ADLS_ACUITY_SCORE: 31
ADLS_ACUITY_SCORE: 52
ADLS_ACUITY_SCORE: 31
ADLS_ACUITY_SCORE: 52

## 2025-03-02 NOTE — PROGRESS NOTES
"  [facility].  Inpatient Cardiology Progress Note.  Date of Service: March 2, 2025      DIAGNOSES/ASSESSMENT:    New diagnosis of acute decompensated heart failure with reduced ejection fraction, LVEF 35-40% with global hypokinesis.  New diagnosis of right heart failure with mildly decreased systolic function.  Mild mitral valve regurgitation.  Chronic stage 3a kidney disease with interval increase in creatinine following the IV diuretic therapy.  Remote history of prostate cancer, status post-prostatectomy.  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  Never tobacco use.  No alcohol use.  Normal BMI.    PLAN:    His creatinine has gone up, he has been diuresing.  He is already received 1 dose of IV furosemide 40 mg morning.  Stop IV Lasix and low-dose lisinopril 2.5 mg which were \"initiated on this admission.  Since his echocardiogram shows a new cardiomyopathy, he will need ischemia evaluation once his creatinine has stabilized.  Please hold the rivaroxaban in anticipation of coronary angiography.  Please keep patient n.p.o. from midnight.  I have not consented him or placed procedure orders because his angiogram is pending stabilization of his creatinine.  Patient will be evaluated by the inpatient cardiology team tomorrow morning.  Cardiology will follow.      Mindi Ballard MD, MD FACC  Cardiology.    High complexity medical decision making.    INTERVAL HISTORY:  Jose Ray is a 89 year old male admitted on 2/28/2025 with new diagnosis of symptomatic acute decompensated heart failure and the minimal nonmyocardial injury pattern troponin patient, on long-term rivaroxaban for remote DVT, remote history of prostate cancer status post prostatectomy.    He has diuresed well with the IV Lasix 40 mg twice daily with significant improvement in symptoms, weight down by 7 pounds, lower extremity edema markedly improved, lungs clear to auscultation, is now on room air with sats of 96%.  /60, " pulse 70 bpm (sinus).    Labs show his creatinine is increased to 1.7 (4), BUN is also up to 39 (from 26 on admission), potassium 3.8, sodium 143, hemoglobin 13.4 consistent    This note was completed in part using dictation via the Dragon voice recognition software. Some word and grammatical errors may occur and must be interpreted in the appropriate clinical context. If there are any questions pertaining to this issue, please contact me for further clarification.       VITAL SIGNS:  Temp: 98  F (36.7  C) Temp src: Oral BP: 110/60 Pulse: 71   Resp: 17 SpO2: 96 % O2 Device: None (Room air)    02/25 0700 - 03/02 0659  In: 690 [P.O.:690]  Out: 5125 [Urine:5125]  Net: -4435  Vitals:    03/01/25 0600 03/02/25 0500   Weight: 69.3 kg (152 lb 11.2 oz) 65.4 kg (144 lb 2.9 oz)

## 2025-03-02 NOTE — PLAN OF CARE
Shift Note 2220-2696:     Patient is alert and oriented x4, forgetful at times.   Mobility: SBA  Vitals/Tele: VSS, tele-SR with 1st AV block  Aggression Stop Light: green    Shift Summary: Continues on fluid restriction. Patient given and educated on heart failure booklet and map. Cards consult completed. Echo completed. External cath removed and pt able to use urinal at bedside.     Discharge Plan: possible discharge in 2-4 days per MD note    See Flow sheets for assessment

## 2025-03-02 NOTE — PROGRESS NOTES
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     Acute exacerbation of newly diagnosed HFrEF  Pulmonary edema, bilateral pleural effusions: Improved  Acute on chronic lower extremity edema: Improved  Suspected type 2 MI, demand ischemia dt CHF   Hypertension, noted on chart review, not on any antihypertensive meds at baseline  *Presented to ED for evaluation of 3 wk hx of worsening shortness of breath, worsening LE edema and slight chest discomfort. Per spouse, in recent days had developed an audible wheeze.   *In ED, was afebrile and VSS. O2 sats stable on RA. Labs notable for Cr 1.4, proBNP 13k, trops elevated but trend was flat (53 -- 50); CBC okay. TSH okay. Viral panel neg.  EKG showed first degree AVB, no ischemic changes. CXR showed pulmonary vascular congestion, interstitial edema and small bilateral pleural effusions.  No hx of CHF or prior echos available to review. Diuresis was initiated with IV Lasix in the ED, started on low dose lisinopril.   *Cardiology consulted.   *Echo this stay showed EF 35-40% and abnormal diastolic function with moderate global hypokinesia of the LV; moderately decreased RVSF; mild MR, trace TR.  *Had good response to initial IV diuresis, Is/Os net negative for 4.4L as of 3/2.   -- developed worsening NORBERT on 3/2, so IV Lasix and lisinopril held  -- await additional input per cardiology in regards to additional testing/med adjustments in light of findings on echo this stay  -- given findings of reduced RVSF this stay, would recommend evaluation for PADMA with OP sleep study    1321 Addendum:  Cardiology recommending further evaluation with angiogram in AM if renal function stable. Xarelto  placed on hold on 3/2 for this reason.    NORBERT on stage IIIa CKD  *Baseline Cr 1.1-1.2. Cr 1.4 on admission this stay. Suspect worsening likely due to cardiorenal syndrome.    *After initial diuresis and low dose lisinopril, Cr bumped to 1.7.   -- hold lisinopril and Lasix today (3/2)  -- repeat BMP in AM    Prediabetes  *A1c 6.1 this stay.   *Consider initiation of Jardiance for GDMT  *Will need OP follow up with PCP for ongoing monitoring    Hx of prior DVT, on chronic anticoagulation  *Chronic and stable on Xarelto, continued this stay.     History of prostate cancer,s/p prostatectomy  *Noted. No specific concerns this stay.      FEN: no IVFs, lytes stable, cardiac diet  DVT Prophylaxis: DOAC as above  Code Status: Full Code    Disposition: Anticipate discharge in 1-2d, pending stable labs and recommendations per cardiology. Therapy recommending discharge home with assist.    Medically Ready for Discharge: Anticipated in 2-4 Days    Will update patient's spouse Usha this afternoon.     [place],     Clinically Significant Risk Factors                   # Hypertension: Noted on problem list  # Acute heart failure with reduced ejection fraction: last echo with EF <40% and receiving IV diuretics                        Medical Decision Making       Please see A&P for additional details of medical decision making.         Interval History   Chart reviewed. Has had good response to diuresis. Feeling okay this morning. No cp/sob/cough, abd pain/n/v. Has only ambulated in hallways a few times thus far. No specific complaints    -Data reviewed today: I reviewed all new labs and imaging results over the last 24 hours. I personally reviewed no images or EKG's today.    Physical Exam   Temp: 98  F (36.7  C) Temp src: Oral BP: 110/60 Pulse: 71   Resp: 17 SpO2: 96 % O2 Device: None (Room air)    Vitals:    03/01/25 0600 03/02/25 0500   Weight: 69.3 kg (152 lb 11.2 oz) 65.4 kg (144 lb 2.9 oz)     Vital Signs  with Ranges  Temp:  [97.4  F (36.3  C)-98.5  F (36.9  C)] 98  F (36.7  C)  Pulse:  [] 71  Resp:  [17-19] 17  BP: (110-134)/() 110/60  SpO2:  [94 %-97 %] 96 %  I/O last 3 completed shifts:  In: 240 [P.O.:240]  Out: 2150 [Urine:2150]    Constitutional: Resting comfortably, alert and conversing appropriately, NAD  Respiratory: CTAB, no wheeze, no increased work of breathing or accessory muscle use  Cardiovascular: HRRR, no MGR, no LE edema  GI: S, NT, ND, +BS  Skin/Integumen: warm/dry  Other:      Medications   Current Facility-Administered Medications   Medication Dose Route Frequency Provider Last Rate Last Admin    Patient is already receiving anticoagulation with heparin, enoxaparin (LOVENOX), warfarin (COUMADIN)  or other anticoagulant medication   Does not apply Continuous PRN Mayo Rodriguez PA-C        Reason beta blocker not prescribed   Other DOES NOT GO TO MAR Mayo Rodriguez PA-C         Current Facility-Administered Medications   Medication Dose Route Frequency Provider Last Rate Last Admin    [Held by provider] furosemide (LASIX) injection 40 mg  40 mg Intravenous BID Tess Quinteros MD   40 mg at 03/01/25 1523    [Held by provider] lisinopril (ZESTRIL) tablet 2.5 mg  2.5 mg Oral Daily Mayo Rodriguez PA-C   2.5 mg at 03/01/25 0919    rivaroxaban ANTICOAGULANT (XARELTO) tablet 20 mg  20 mg Oral Daily with supper Mayo Rodriguez PA-C   20 mg at 03/01/25 1716    sodium chloride (PF) 0.9% PF flush 3 mL  3 mL Intracatheter Q8H Mayo Rodriguez PA-C   3 mL at 03/01/25 2045       Data   Recent Labs   Lab 03/02/25  0526 03/01/25  0555 02/28/25  1624   WBC  --  6.0 7.1   HGB  --  13.4 14.2   MCV  --  95 99   PLT  --  206 213    142 144   POTASSIUM 3.8 4.5 4.6   CHLORIDE 104 106 107   CO2 29 24 24   BUN 39.0* 34.3* 36.6*   CR 1.72* 1.39* 1.40*   ANIONGAP 10 12 13   REY 9.1 8.9 9.1   GLC 92 105* 148*   ALBUMIN  --   --  4.0   PROTTOTAL  --   --   6.6   BILITOTAL  --   --  0.9   ALKPHOS  --   --  94   ALT  --   --  42   AST  --   --  27       Recent Results (from the past 24 hours)   Echocardiogram Complete   Result Value    LVEF  35-40%    [facility]    [id number]  [id number]  [id number]  [id number]^TIFFANIE^LIBAN^KAY     [facility]  Echocardiography Laboratory  [address] [place] [address]     Name: SHAN VALERA  MRN: [id number]  : 1935  Study Date: 2025 11:57 AM  Age: 89 yrs  Gender: Male  Patient Location: [facility]  Reason For Study: Heart Failure  Ordering Physician: LIBAN MORENO  Referring Physician: Hosea Lockett  Performed By: Tameka Collazo     BSA: 1.8 m2  Height: 66 in  Weight: 152 lb  HR: 74  BP: 112/77 mmHg  ______________________________________________________________________________  Procedure  Echocardiogram with two-dimensional, color and spectral Doppler. Definity (NDC  #[id number]) given intravenously.  ______________________________________________________________________________  Interpretation Summary     1. The left ventricle is normal in size. There is mild concentric left  ventricular hypertrophy. Left ventricular systolic function is moderately  reduced. The visual ejection fraction is 35-40%. Left ventricular diastolic  function is abnormal. There is moderate global hypokinesia of the left  ventricle. There is no thrombus seen in the left ventricle.  2. The right ventricle is normal size. Moderately decreased right ventricular  systolic function.  3. Mild to moderate biatrial enlargement.  4. No pericardial effusion.  5. No previous study for comparison.  ______________________________________________________________________________  Left Ventricle  The left ventricle is normal in size. There is mild concentric left  ventricular hypertrophy. Left ventricular systolic function is moderately  reduced. The visual ejection fraction is 35-40%. Left ventricular  diastolic  function is abnormal. There is moderate global hypokinesia of the left  ventricle. There is no thrombus seen in the left ventricle.     Right Ventricle  The right ventricle is normal size. Moderately decreased right ventricular  systolic function.     Atria  The left atrium is moderately dilated. The right atrium is mildly dilated.  There is no color Doppler evidence of an atrial shunt.     Mitral Valve  There is mild (1+) mitral regurgitation.     Tricuspid Valve  There is trace tricuspid regurgitation. The right ventricular systolic  pressure is approximated at 20.6 mmHg plus the right atrial pressure.     Aortic Valve  There is mild trileaflet aortic sclerosis. No aortic regurgitation is present.  No aortic stenosis is present.     Pulmonic Valve  There is no pulmonic valvular stenosis.     Vessels  The aortic root is normal size. Normal size ascending aorta. Descending aortic  velocity normal. The inferior vena cava is normal.     Pericardium  There is no pericardial effusion.     Rhythm  Sinus rhythm was noted.  ______________________________________________________________________________  MMode/2D Measurements & Calculations  IVSd: 1.3 cm     LVIDd: 5.0 cm  LVIDs: 4.2 cm  LVPWd: 1.2 cm  FS: 16.0 %  LV mass(C)d: 247.6 grams  LV mass(C)dI: 139.1 grams/m2  Ao root diam: 3.8 cm  LA dimension: 4.2 cm  asc Aorta Diam: 3.5 cm  LA/Ao: 1.1  Ao root diam index Ht(cm/m): 2.3  Ao root diam index BSA (cm/m2): 2.1  Asc Ao diam index BSA (cm/m2): 2.0  Asc Ao diam index Ht(cm/m): 2.1  EF Biplane: 32.5 %  LA Volume (BP): 87.4 ml     LA Volume Index (BP): 49.1 ml/m2  RV Base: 4.0 cm  RWT: 0.48  TAPSE: 1.8 cm     Doppler Measurements & Calculations  MV E max casimiro: 61.1 cm/sec  MV A max casimiro: 23.8 cm/sec  MV E/A: 2.6  MV dec slope: 305.0 cm/sec2  MV dec time: 0.20 sec  PA acc time: 0.11 sec  TR max casimiro: 226.9 cm/sec  TR max P.6 mmHg  E/E' av.1  Lateral E/e': 5.4  Medial E/e': 10.9  RV S Casimiro: 9.1 cm/sec      ______________________________________________________________________________  Report approved by: Clay Jauregui MD on 03/01/2025 01:32 PM

## 2025-03-02 NOTE — PROGRESS NOTES
A&O x 4, pleasant. VSS on RA, denied pain/SOB. External catheter at bedtime, adequate output. 2000 mL fluid restriciton. Up x SBA with walker/GB. Tele SR with 1st degree AVB. Plan for diuresing. Discharge plan 2-3 days pending in progress.

## 2025-03-03 LAB
ANION GAP SERPL CALCULATED.3IONS-SCNC: 11 MMOL/L (ref 7–15)
BUN SERPL-MCNC: 43.5 MG/DL (ref 8–23)
CALCIUM SERPL-MCNC: 9 MG/DL (ref 8.8–10.4)
CHLORIDE SERPL-SCNC: 105 MMOL/L (ref 98–107)
CREAT SERPL-MCNC: 1.52 MG/DL (ref 0.67–1.17)
EGFRCR SERPLBLD CKD-EPI 2021: 44 ML/MIN/1.73M2
GLUCOSE SERPL-MCNC: 97 MG/DL (ref 70–99)
HCO3 SERPL-SCNC: 26 MMOL/L (ref 22–29)
HOLD SPECIMEN: NORMAL
POTASSIUM SERPL-SCNC: 3.9 MMOL/L (ref 3.4–5.3)
SODIUM SERPL-SCNC: 142 MMOL/L (ref 135–145)

## 2025-03-03 PROCEDURE — 80048 BASIC METABOLIC PNL TOTAL CA: CPT | Performed by: INTERNAL MEDICINE

## 2025-03-03 PROCEDURE — 36415 COLL VENOUS BLD VENIPUNCTURE: CPT | Performed by: INTERNAL MEDICINE

## 2025-03-03 PROCEDURE — 210N000002 HC R&B HEART CARE

## 2025-03-03 PROCEDURE — 250N000013 HC RX MED GY IP 250 OP 250 PS 637: Performed by: PHYSICIAN ASSISTANT

## 2025-03-03 PROCEDURE — 99232 SBSQ HOSP IP/OBS MODERATE 35: CPT | Mod: FS | Performed by: INTERNAL MEDICINE

## 2025-03-03 RX ORDER — METOPROLOL SUCCINATE 25 MG/1
25 TABLET, EXTENDED RELEASE ORAL DAILY
Status: DISCONTINUED | OUTPATIENT
Start: 2025-03-03 | End: 2025-03-04 | Stop reason: HOSPADM

## 2025-03-03 RX ADMIN — METOPROLOL SUCCINATE 25 MG: 25 TABLET, EXTENDED RELEASE ORAL at 11:32

## 2025-03-03 ASSESSMENT — ACTIVITIES OF DAILY LIVING (ADL)
ADLS_ACUITY_SCORE: 31
ADLS_ACUITY_SCORE: 31
ADLS_ACUITY_SCORE: 41
ADLS_ACUITY_SCORE: 41
ADLS_ACUITY_SCORE: 31
ADLS_ACUITY_SCORE: 31
ADLS_ACUITY_SCORE: 41
ADLS_ACUITY_SCORE: 31
ADLS_ACUITY_SCORE: 41
ADLS_ACUITY_SCORE: 31
ADLS_ACUITY_SCORE: 41
ADLS_ACUITY_SCORE: 41
ADLS_ACUITY_SCORE: 44
ADLS_ACUITY_SCORE: 31
ADLS_ACUITY_SCORE: 31
ADLS_ACUITY_SCORE: 41
ADLS_ACUITY_SCORE: 41
ADLS_ACUITY_SCORE: 44
ADLS_ACUITY_SCORE: 31
ADLS_ACUITY_SCORE: 44
ADLS_ACUITY_SCORE: 44

## 2025-03-03 NOTE — PROGRESS NOTES
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      Acute exacerbation of newly diagnosed HFrEF  Pulmonary edema, bilateral pleural effusions: Improved  Acute on chronic lower extremity edema: Improved  Suspected type 2 MI, demand ischemia dt CHF   Hypertension, noted on chart review, not on any antihypertensive meds at baseline  *Presented to ED for evaluation of 3 wk hx of worsening shortness of breath, worsening LE edema and slight chest discomfort. Per spouse, in recent days had developed an audible wheeze.   *In ED, was afebrile and VSS. O2 sats stable on RA. Labs notable for Cr 1.4, proBNP 13k, trops elevated but trend was flat (53 -- 50); CBC okay. TSH okay. Viral panel neg.  EKG showed first degree AVB, no ischemic changes. CXR showed pulmonary vascular congestion, interstitial edema and small bilateral pleural effusions.  No hx of CHF or prior echos available to review. Diuresis was initiated with IV Lasix in the ED, started on low dose lisinopril.   *Cardiology consulted.   *Echo this stay showed EF 35-40% and abnormal diastolic function with moderate global hypokinesia of the LV; moderately decreased RVSF; mild MR, trace TR.  *Had good response to initial IV diuresis, Is/Os net negative for 4.4L as of 3/2.   -- developed worsening NORBERT on 3/2, so IV Lasix and lisinopril held   --if renal function better 3/4, may restart diuretic  -- metoprolol succinate 25mg daily added 3/3  -- pharmacy liaison consulted for SGLT2i and ARNi coverage (both 40$/mo)  -- cardiology will arrange outpatient ischemic work-up if asymptomatic during cardiac rehab  -- given findings of reduced RVSF this stay, would  recommend evaluation for PADMA with OP sleep study     NORBERT on stage IIIa CKD  *Baseline Cr 1.1-1.2. Cr 1.4 on admission this stay. Suspect worsening likely due to cardiorenal syndrome.    *After initial diuresis and low dose lisinopril, Cr bumped to 1.7.   -- held lisinopril and Lasix since 3/2  -- Cr-1.52 on 3/3  -- BMP in AM     Prediabetes  *A1c 6.1 this stay.   *Consider initiation of Jardiance for GDMT  *Will need OP follow up with PCP for ongoing monitoring     Hx of prior DVT, on chronic anticoagulation  *Chronic and stable on Xarelto   -currently held on 3/3--if no plan for inpatient angiogram, will resume on 3/4     History of prostate cancer,s/p prostatectomy  *Noted. No specific concerns this stay.           Diet: Low Saturated Fat Na <2400 mg    DVT Prophylaxis: DOAC on hold  [place] Catheter: Not present  Lines: None     Cardiac Monitoring: ACTIVE order. Indication: Acute decompensated heart failure (48 hours)  Code Status: Full Code      Clinically Significant Risk Factors                   # Hypertension: Noted on problem list  # Acute heart failure with reduced ejection fraction: last echo with EF <40% and receiving IV diuretics                      Social Drivers of Health    Physical Activity: Insufficiently Active (9/24/2024)    Exercise Vital Sign     Days of Exercise per Week: 2 days     Minutes of Exercise per Session: 30 min   Social Connections: Unknown (9/24/2024)    Social Connection and Isolation Panel [NHANES]     Frequency of Social Gatherings with Friends and Family: Three times a week          Disposition Plan     Medically Ready for Discharge: Anticipated in 2-4 Days  Pending cardiac work-up           Angeles Pierson DO  Hospitalist Service  [facility]  Securely message with [employer] (more info)  Text page via [employer] Paging/Directory   ______________________________________________________________________    Interval History   Patient seen and examined. He is  feeling fine. Taking notes from all visitors to his room and reporting changes to his family via text. Answered questions regarding metoprolol and lasix. Denies shortness of breath, chest pain, abdominal pain. Has no complaints.    Physical Exam   Vital Signs: Temp: 98.1  F (36.7  C) Temp src: Oral BP: 127/84 Pulse: 77   Resp: 18 SpO2: 97 % O2 Device: None (Room air)    Weight: 147 lbs 12.8 oz    Constitutional: Awake, alert, cooperative, no apparent distress  Respiratory: Clear to auscultation bilaterally, no crackles or wheezing  Cardiovascular: Regular rate and rhythm, normal S1 and S2, and no murmur noted  GI: Normal bowel sounds, soft, non-distended, non-tender  Skin/Integumen: No rashes, no cyanosis, no edema  Other:      Medical Decision Making       40 MINUTES SPENT BY ME on the date of service doing chart review, history, exam, documentation & further activities per the note.      Data     I have personally reviewed the following data over the past 24 hrs:    N/A  \   N/A   / N/A     142 105 43.5 (H) /  97   3.9 26 1.52 (H) \       Imaging results reviewed over the past 24 hrs:   No results found for this or any previous visit (from the past 24 hours).

## 2025-03-03 NOTE — PROGRESS NOTES
[facility]  Cardiology Progress Note    Date of Service (when I saw the patient): 03/03/2025  Primary Cardiologist: no prior cardiology care       Interval History:   SOB and peripheral edema has resolved. No new issues. Currently NPO.    ----------------------------------------------------------------------------------------    Assessment:  Jose Ray is a 89 year old male who was admitted on 2/28/2025 with weight gain, SOB, and peripheral edema.     # Acute HFrEF/new systolic CM  -- LVEF 35-40% with global hypokinesis   -- not on any GDMT agents    # Mild Troponin rise   -- flat in pattern, no ECG changes, no WMA on TTE  -- likely demand ischemia    # CKD stage 3 with mild NORBERT following IV diuresis, improving    # Remote history of prostate cancer, status post-prostatectomy.  # Remote DVT on chronic rivaroxaban    ----------------------------------------------------------------------------------------    Plan:  -- Initiate metoprolol succinate 25 mg daily  -- Pharm liaison for SGLT2i and ARNi coverage  -- Cardiac rehab today to assess symptoms  -- Renal function is improving but not quite at baseline- recheck tomorrow (3/4) and if stable initiate loop diuretic  -- If asymptomatic during rehab (no exertional CP) we will arrange outpatient with ischemic work up  -- Discontinue NPO status  -- Cardiology will follow    ----------------------------------------------------------------------------------------  Physical Exam   Temp: 98.1  F (36.7  C) Temp src: Oral BP: 127/84 Pulse: 77   Resp: 18 SpO2: 97 % O2 Device: None (Room air)    Vitals:    03/02/25 0500 03/02/25 1055 03/03/25 0533   Weight: 65.4 kg (144 lb 2.9 oz) 66 kg (145 lb 8 oz) 67 kg (147 lb 12.8 oz)     GEN:  NAD  HEENT: Mucous membranes moist.  NECK:  No JVD.  C/V:  Regular rate and rhythm, no murmur, rub or gallop.   RESP: Respirations are unlabored. No use of accessory muscles. Clear to auscultation bilaterally  without wheezing, rales, or rhonchi.  GI: Abdomen soft, nontender, nondistended.    EXTREM: No pitting LE edema.   NEURO: Alert and oriented, cooperative.   PSYCH: Normal affect.  SKIN: Warm and dry.   VASC: 2+ radial and dorsalis pedis pulses bilaterally.      Medications   Current Facility-Administered Medications   Medication Dose Route Frequency Provider Last Rate Last Admin    Patient is already receiving anticoagulation with heparin, enoxaparin (LOVENOX), warfarin (COUMADIN)  or other anticoagulant medication   Does not apply Continuous PRN Mayo Rodriguez PA-C        Reason beta blocker not prescribed   Other DOES NOT GO TO MAR Mayo Rodriguez PA-C         Current Facility-Administered Medications   Medication Dose Route Frequency Provider Last Rate Last Admin    [Held by provider] rivaroxaban ANTICOAGULANT (XARELTO) tablet 20 mg  20 mg Oral Daily with supper Mayo Rodriguez PA-C   20 mg at 03/01/25 1716    sodium chloride (PF) 0.9% PF flush 3 mL  3 mL Intracatheter Q8H Mayo Rodriguez PA-C   3 mL at 03/02/25 1807       Data   Reviewed      Lionel Melissa PA-C   3/3/2025  Pager: Guillermo

## 2025-03-03 NOTE — PLAN OF CARE
Shift Note [id number]:     Patient is alert and oriented x4.   Mobility: SBA with gait belt  Vitals/Tele: VSS- some soft b/p's during afternoon since resolved. Tele: SR/SB  Aggression Stop Light: green    Shift Summary: discontinued patient's fluid restriction. Ambulated in hallway with staff. Encouraged to get in chair for all meals.     Discharge Plan: discharge in 1-2 days when cleared from cards    See Flow sheets for assessment     Spoke to pt's dad, informed pt needs an appointment for evaluation of cough and congestion x 2 weeks  Pt's dad agreed, will call back to make an appointment

## 2025-03-03 NOTE — PLAN OF CARE
[facility] Shift Note    Orientation: A/O x 4  Vitals: VSS on RA  Tele: SR w/1st degree  Lines/Drains: SL  GI/: WDL. External cath   Ambulation/Assist: SBA w/gt and walker.  Plan: Possible angio today.    Sahara Mullins RN

## 2025-03-03 NOTE — CONSULTS
Patient has Medicare Advantage through [employer] sponsored by an employer.    Jardiance/Farxiga: $40/mo.   Entresto: $40/mo.     Ashanti Luo  Pharmacy Technician/Liaison, Discharge Pharmacy   [phone] (voice or text)  [email or web address]  Pharmacy test claims are estimates and may not reflect final costs.  Suggested alternatives aim to be cost-effective and may not be therapeutically equivalent as this consult is informational and does not constitute medical advice.  Clinical decisions should be made by qualified healthcare providers.

## 2025-03-04 VITALS
BODY MASS INDEX: 23.71 KG/M2 | TEMPERATURE: 97.5 F | SYSTOLIC BLOOD PRESSURE: 124 MMHG | DIASTOLIC BLOOD PRESSURE: 88 MMHG | OXYGEN SATURATION: 94 % | RESPIRATION RATE: 17 BRPM | WEIGHT: 146.9 LBS | HEART RATE: 74 BPM

## 2025-03-04 LAB
ANION GAP SERPL CALCULATED.3IONS-SCNC: 11 MMOL/L (ref 7–15)
BUN SERPL-MCNC: 38.8 MG/DL (ref 8–23)
CALCIUM SERPL-MCNC: 9 MG/DL (ref 8.8–10.4)
CHLORIDE SERPL-SCNC: 107 MMOL/L (ref 98–107)
CREAT SERPL-MCNC: 1.41 MG/DL (ref 0.67–1.17)
EGFRCR SERPLBLD CKD-EPI 2021: 48 ML/MIN/1.73M2
ERYTHROCYTE [DISTWIDTH] IN BLOOD BY AUTOMATED COUNT: 13.4 % (ref 10–15)
GLUCOSE SERPL-MCNC: 105 MG/DL (ref 70–99)
HCO3 SERPL-SCNC: 25 MMOL/L (ref 22–29)
HCT VFR BLD AUTO: 43.3 % (ref 40–53)
HGB BLD-MCNC: 14.7 G/DL (ref 13.3–17.7)
MAGNESIUM SERPL-MCNC: 2.1 MG/DL (ref 1.7–2.3)
MCH RBC QN AUTO: 32.5 PG (ref 26.5–33)
MCHC RBC AUTO-ENTMCNC: 33.9 G/DL (ref 31.5–36.5)
MCV RBC AUTO: 96 FL (ref 78–100)
NT-PROBNP SERPL-MCNC: 4399 PG/ML (ref 0–1800)
PHOSPHATE SERPL-MCNC: 3.6 MG/DL (ref 2.5–4.5)
PLATELET # BLD AUTO: 211 10E3/UL (ref 150–450)
POTASSIUM SERPL-SCNC: 4.2 MMOL/L (ref 3.4–5.3)
RBC # BLD AUTO: 4.52 10E6/UL (ref 4.4–5.9)
SODIUM SERPL-SCNC: 143 MMOL/L (ref 135–145)
WBC # BLD AUTO: 5.8 10E3/UL (ref 4–11)

## 2025-03-04 PROCEDURE — 83735 ASSAY OF MAGNESIUM: CPT | Performed by: HOSPITALIST

## 2025-03-04 PROCEDURE — 250N000013 HC RX MED GY IP 250 OP 250 PS 637: Performed by: PHYSICIAN ASSISTANT

## 2025-03-04 PROCEDURE — 80048 BASIC METABOLIC PNL TOTAL CA: CPT | Performed by: HOSPITALIST

## 2025-03-04 PROCEDURE — 99233 SBSQ HOSP IP/OBS HIGH 50: CPT | Mod: FS | Performed by: PHYSICIAN ASSISTANT

## 2025-03-04 PROCEDURE — 99239 HOSP IP/OBS DSCHRG MGMT >30: CPT | Performed by: HOSPITALIST

## 2025-03-04 PROCEDURE — 82565 ASSAY OF CREATININE: CPT | Performed by: HOSPITALIST

## 2025-03-04 PROCEDURE — 85018 HEMOGLOBIN: CPT | Performed by: HOSPITALIST

## 2025-03-04 PROCEDURE — 36415 COLL VENOUS BLD VENIPUNCTURE: CPT | Performed by: HOSPITALIST

## 2025-03-04 PROCEDURE — 84100 ASSAY OF PHOSPHORUS: CPT | Performed by: HOSPITALIST

## 2025-03-04 PROCEDURE — 85048 AUTOMATED LEUKOCYTE COUNT: CPT | Performed by: HOSPITALIST

## 2025-03-04 PROCEDURE — 97110 THERAPEUTIC EXERCISES: CPT | Mod: GO

## 2025-03-04 PROCEDURE — 83880 ASSAY OF NATRIURETIC PEPTIDE: CPT | Performed by: PHYSICIAN ASSISTANT

## 2025-03-04 RX ORDER — FUROSEMIDE 20 MG/1
20 TABLET ORAL DAILY
Qty: 30 TABLET | Refills: 0 | Status: SHIPPED | OUTPATIENT
Start: 2025-03-04

## 2025-03-04 RX ORDER — FUROSEMIDE 20 MG/1
20 TABLET ORAL DAILY
Status: DISCONTINUED | OUTPATIENT
Start: 2025-03-04 | End: 2025-03-04 | Stop reason: HOSPADM

## 2025-03-04 RX ORDER — METOPROLOL SUCCINATE 25 MG/1
25 TABLET, EXTENDED RELEASE ORAL DAILY
Qty: 30 TABLET | Refills: 0 | Status: SHIPPED | OUTPATIENT
Start: 2025-03-05

## 2025-03-04 RX ADMIN — METOPROLOL SUCCINATE 25 MG: 25 TABLET, EXTENDED RELEASE ORAL at 08:16

## 2025-03-04 RX ADMIN — FUROSEMIDE 20 MG: 20 TABLET ORAL at 11:33

## 2025-03-04 ASSESSMENT — ACTIVITIES OF DAILY LIVING (ADL)
ADLS_ACUITY_SCORE: 41
ADLS_ACUITY_SCORE: 39
ADLS_ACUITY_SCORE: 39
ADLS_ACUITY_SCORE: 41
ADLS_ACUITY_SCORE: 41
ADLS_ACUITY_SCORE: 39
ADLS_ACUITY_SCORE: 41
ADLS_ACUITY_SCORE: 39
ADLS_ACUITY_SCORE: 41
ADLS_ACUITY_SCORE: 39

## 2025-03-04 NOTE — DISCHARGE SUMMARY
[facility]  Hospitalist Discharge Summary      Date of Admission:  2/28/2025  Date of Discharge:  3/4/2025  Discharging Provider: Scarlett Nichols MD  Discharge Service: Hospitalist Service    Discharge Diagnoses     Please refer to the hospital course below    Clinically Significant Risk Factors          Follow-ups Needed After Discharge   Follow-up Appointments       Hospital Follow-up with Existing Primary Care Provider (PCP)      Please see details below         Schedule Primary Care visit within: 7 Days   Recommended labs and Imaging (to be ordered by Primary Care Provider): BMP in a week                Unresulted Labs Ordered in the Past 30 Days of this Admission       No orders found from 1/29/2025 to 3/1/2025.        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      Acute exacerbation of newly diagnosed HFrEF  Pulmonary edema, bilateral pleural effusions: Improved  Acute on chronic lower extremity edema: Improved  Suspected type 2 MI, demand ischemia dt CHF   Hypertension, noted on chart review, not on any antihypertensive meds at baseline  *Presented to ED for evaluation of 3 wk hx of worsening shortness of breath, worsening LE edema and slight chest discomfort. Per spouse, in recent days had developed an audible wheeze.   *In ED, was afebrile and VSS. O2 sats stable on RA. Labs notable for Cr 1.4, proBNP 13k, trops elevated but trend was flat (53 -- 50); CBC okay. TSH okay. Viral panel neg.  EKG showed first degree AVB, no ischemic changes. CXR showed pulmonary vascular congestion, interstitial edema and small bilateral pleural effusions.  No hx of CHF or prior  echos available to review. Diuresis was initiated with IV Lasix in the ED, started on low dose lisinopril.   *Cardiology consulted.   *Echo this stay showed EF 35-40% and abnormal diastolic function with moderate global hypokinesia of the LV; moderately decreased RVSF; mild MR, trace TR.  *Had good response to initial IV diuresis, Is/Os net negative with weight trending down.   -- developed worsening NORBERT and so IV Lasix and lisinopril held . Cr improved and at discharge, lasix 20 mg PO daily resumed. Needs BMP with PCP follow up.   -- metoprolol succinate 25mg daily added   -- pharmacy liaison consulted for SGLT2i and ARNi coverage, plan is to start as outpatient with cardiology follow up  -- cardiology will arrange outpatient ischemic work-up   -- given findings of reduced RVSF this stay, would recommend evaluation for PADMA with OP sleep study     NORBERT on stage IIIa CKD  *Baseline Cr 1.1-1.2. Cr 1.4 on admission this stay. Suspect worsening likely due to cardiorenal syndrome.    *After initial diuresis and low dose lisinopril, Cr bumped to 1.7.   -- held lisinopril and Lasix  and Cr improved to 1.4   - Lasix PO at discharge, no ACEI yet.      Prediabetes  *A1c 6.1 this stay.   *Consider initiation of Jardiance for GDMT as outpatient once Cr stable on follow up  *Will need OP follow up with PCP for ongoing monitoring     Hx of prior DVT, on chronic anticoagulation  *Chronic and stable on Xarelto, resumed after no plan for angiogram inptatient.      History of prostate cancer,s/p prostatectomy  *Noted. No specific concerns this stay.        Consultations This Hospital Stay   CARDIOLOGY IP CONSULT  OCCUPATIONAL THERAPY ADULT IP CONSULT  PHARMACY LIAISON FOR MEDICATION COVERAGE CONSULT    Code Status   Full Code    Time Spent on this Encounter   I, Scarlett Nichols MD, personally saw the patient today and spent greater than 30 minutes discharging this patient.       Scarlett Nichols MD  [facility]  [facility] HEART CARE  [facility] [address]  [place] [address]  Phone: [phone]  ______________________________________________________________________    Physical Exam   Vital Signs: Temp: 97.5  F (36.4  C) Temp src: Oral BP: 115/78 Pulse: 74   Resp: 17 SpO2: 94 % O2 Device: None (Room air)    Weight: 146 lbs 14.4 oz    General: AAOx3, appears comfortable.  HEENT: PERRLA EOMI. Mucosa moist.   Lungs: Bilateral equal air entry. Clear to auscultation, normal work of breathing.   CVS: S1S2 regular, no tachycardia or murmur.   Abdomen: Soft, NT, ND. BS heard.  MSK: No edema or deformities.  Neuro: AAOX3. CN 2-12 normal. Strength symmetrical.  Skin: No rash.          Primary Care Physician   Hosea Lockett    Discharge Orders      Adult Nephrology  Referral      Reason for your hospital stay    Acute congestive heart failure     Activity    Your activity upon discharge: activity as tolerated     Diet    Follow this diet upon discharge: Current Diet:Orders Placed This Encounter      Low Saturated Fat Na <2400 mg     Hospital Follow-up with Existing Primary Care Provider (PCP)    Please see details below            Significant Results and Procedures   Most Recent 3 CBC's:  Recent Labs   Lab Test 03/04/25  0647 03/01/25  0555 02/28/25  1624   WBC 5.8 6.0 7.1   HGB 14.7 13.4 14.2   MCV 96 95 99    206 213     Most Recent 3 BMP's:  Recent Labs   Lab Test 03/04/25  0647 03/03/25  0559 03/02/25  0526    142 143   POTASSIUM 4.2 3.9 3.8   CHLORIDE 107 105 104   CO2 25 26 29   BUN 38.8* 43.5* 39.0*   CR 1.41* 1.52* 1.72*   ANIONGAP 11 11 10   REY 9.0 9.0 9.1   * 97 92     Most Recent 2 LFT's:  Recent Labs   Lab Test 02/28/25  1624 09/26/24  1043   AST 27 17   ALT 42 10   ALKPHOS 94 61   BILITOTAL 0.9 0.9   ,   Results for orders placed or performed during the hospital encounter of 02/28/25   XR Chest 2 Views    Narrative    EXAM: XR CHEST 2 VIEWS  LOCATION: [facility]  HOSPITAL  DATE: 2025    INDICATION: shortness of breath  COMPARISON: None.      Impression    IMPRESSION: Cardiac silhouette is mildly enlarged. Pulmonary vascular congestion. Basilar predominant interstitial prominence suggestive of interstitial edema. Small bilateral pleural effusions.   Echocardiogram Complete     Value    LVEF  35-40%    [facility]    [id number]  [id number]  [id number]  [id number]^TIFFANIE^LIBAN^KAY     [facility]  Echocardiography Laboratory  [address] [place] [address]     Name: SHAN VALERA  MRN: [id number]  : 1935  Study Date: 2025 11:57 AM  Age: 89 yrs  Gender: Male  Patient Location: [facility]  Reason For Study: Heart Failure  Ordering Physician: LIBAN MORENO  Referring Physician: Hosea Lockett  Performed By: Tameka Collazo     BSA: 1.8 m2  Height: 66 in  Weight: 152 lb  HR: 74  BP: 112/77 mmHg  ______________________________________________________________________________  Procedure  Echocardiogram with two-dimensional, color and spectral Doppler. Definity (NDC  #53677-113) given intravenously.  ______________________________________________________________________________  Interpretation Summary     1. The left ventricle is normal in size. There is mild concentric left  ventricular hypertrophy. Left ventricular systolic function is moderately  reduced. The visual ejection fraction is 35-40%. Left ventricular diastolic  function is abnormal. There is moderate global hypokinesia of the left  ventricle. There is no thrombus seen in the left ventricle.  2. The right ventricle is normal size. Moderately decreased right ventricular  systolic function.  3. Mild to moderate biatrial enlargement.  4. No pericardial effusion.  5. No previous study for comparison.  ______________________________________________________________________________  Left Ventricle  The left ventricle is normal in size. There is mild concentric  left  ventricular hypertrophy. Left ventricular systolic function is moderately  reduced. The visual ejection fraction is 35-40%. Left ventricular diastolic  function is abnormal. There is moderate global hypokinesia of the left  ventricle. There is no thrombus seen in the left ventricle.     Right Ventricle  The right ventricle is normal size. Moderately decreased right ventricular  systolic function.     Atria  The left atrium is moderately dilated. The right atrium is mildly dilated.  There is no color Doppler evidence of an atrial shunt.     Mitral Valve  There is mild (1+) mitral regurgitation.     Tricuspid Valve  There is trace tricuspid regurgitation. The right ventricular systolic  pressure is approximated at 20.6 mmHg plus the right atrial pressure.     Aortic Valve  There is mild trileaflet aortic sclerosis. No aortic regurgitation is present.  No aortic stenosis is present.     Pulmonic Valve  There is no pulmonic valvular stenosis.     Vessels  The aortic root is normal size. Normal size ascending aorta. Descending aortic  velocity normal. The inferior vena cava is normal.     Pericardium  There is no pericardial effusion.     Rhythm  Sinus rhythm was noted.  ______________________________________________________________________________  MMode/2D Measurements & Calculations  IVSd: 1.3 cm     LVIDd: 5.0 cm  LVIDs: 4.2 cm  LVPWd: 1.2 cm  FS: 16.0 %  LV mass(C)d: 247.6 grams  LV mass(C)dI: 139.1 grams/m2  Ao root diam: 3.8 cm  LA dimension: 4.2 cm  asc Aorta Diam: 3.5 cm  LA/Ao: 1.1  Ao root diam index Ht(cm/m): 2.3  Ao root diam index BSA (cm/m2): 2.1  Asc Ao diam index BSA (cm/m2): 2.0  Asc Ao diam index Ht(cm/m): 2.1  EF Biplane: 32.5 %  LA Volume (BP): 87.4 ml     LA Volume Index (BP): 49.1 ml/m2  RV Base: 4.0 cm  RWT: 0.48  TAPSE: 1.8 cm     Doppler Measurements & Calculations  MV E max mary: 61.1 cm/sec  MV A max mary: 23.8 cm/sec  MV E/A: 2.6  MV dec slope: 305.0 cm/sec2  MV dec time: 0.20 sec  PA acc  time: 0.11 sec  TR max casimiro: 226.9 cm/sec  TR max P.6 mmHg  E/E' av.1  Lateral E/e': 5.4  Medial E/e': 10.9  RV S Casimiro: 9.1 cm/sec     ______________________________________________________________________________  Report approved by: Clay Jauregui MD on 2025 01:32 PM             Discharge Medications   Current Discharge Medication List        START taking these medications    Details   furosemide (LASIX) 20 MG tablet Take 1 tablet (20 mg) by mouth daily.  Qty: 30 tablet, Refills: 0    Comments: Future refills by PCP Dr. Hosea Lockett with phone number [phone].  Associated Diagnoses: Acute on chronic congestive heart failure, unspecified heart failure type (H)      metoprolol succinate ER (TOPROL XL) 25 MG 24 hr tablet Take 1 tablet (25 mg) by mouth daily.  Qty: 30 tablet, Refills: 0    Comments: Future refills by PCP Dr. Hosea Lockett with phone number [phone].  Associated Diagnoses: Acute on chronic congestive heart failure, unspecified heart failure type (H)           CONTINUE these medications which have NOT CHANGED    Details   ascorbic acid 1000 MG TABS tablet Take 1,000 mg by mouth daily.      Cholecalciferol (VITAMIN D PO) Take 1,000 Units by mouth daily       ferrous sulfate (FEROSUL) 325 (65 Fe) MG tablet Take 325 mg by mouth daily (with breakfast)      MAGNESIUM PO Take 2 capsules by mouth daily.      !! MULTIPLE VITAMIN PO Take 1 tablet by mouth every morning      rivaroxaban ANTICOAGULANT (XARELTO ANTICOAGULANT) 20 MG TABS tablet TAKE 1 TABLET (20 MG) BY MOUTH DAILY (WITH DINNER)  Qty: 90 tablet, Refills: 3    Associated Diagnoses: DVT, lower extremity, distal, acute, right (H)      triamcinolone (KENALOG) 0.1 % external cream Apply topically daily as needed for irritation (Back)       !! vitamin B complex with vitamin C (STRESS TAB) tablet Take 1 tablet by mouth daily      zinc gluconate 50 MG tablet Take 50 mg by mouth daily       !! - Potential duplicate medications found. Please  discuss with provider.        Allergies   Allergies   Allergen Reactions    Cat Hair [Cats]     Amoxicillin Rash

## 2025-03-04 NOTE — PLAN OF CARE
Heart Failure Care Map  GOALS TO BE MET BEFORE DISCHARGE:    1. Decrease congestion and/or edema with diuretic therapy to achieve near optimal volume status.     Dyspnea improved: Yes, satisfactory for discharge.   Edema improved: No, further care required to meet this goal. Please explain 1+ LE EDEMA        Last 24 hour I/O:   Intake/Output Summary (Last 24 hours) at 3/3/2025 2143  Last data filed at 3/3/2025 1100  Gross per 24 hour   Intake 450 ml   Output 500 ml   Net -50 ml           Net I/O and Weights since admission:   02/01 2300 - 03/03 2259  In: 1830 [P.O.:1830]  Out: 5975 [Urine:5975]  Net: -4145     Vitals:    03/01/25 0600 03/02/25 0500 03/02/25 1055 03/03/25 0533   Weight: 69.3 kg (152 lb 11.2 oz) 65.4 kg (144 lb 2.9 oz) 66 kg (145 lb 8 oz) 67 kg (147 lb 12.8 oz)       2.  O2 sats > 90% on room air, or at prior home O2 therapy level.      Able to wean O2 this shift to keep sats above 90%?: Yes, satisfactory for discharge.   Does patient use Home O2? No          Current oxygenation status:   SpO2: 94 %     O2 Device: None (Room air),      3.  Tolerates ambulation and mobility near baseline.     Ambulation: Yes, satisfactory for discharge.   Times patient ambulated with staff this shift: 1    Please review the Heart Failure Care Map for additional HF goal outcomes.      Pt stable from 7-11PM, no c/o pain or SOB, tele:SR.  No new issues noted.  Plan of care is on going.  Ap Polanco, RN  3/3/2025

## 2025-03-04 NOTE — CONSULTS
Writer met with patient briefly with bedside RN present.  Pt is independent with mobility and his wife is a retired RN that assists with appointments, etc.  PCP and cardiology follow ups already arranged and added to AVS.  Nephrology referral made and will contact patient directly to arrange.   No other care coordination needs identified.    No further care management intervention anticipated at this time.  Please re-consult if further needs arise.  Care management signing off.       Ashanti Mauro RN Care Coordinator  [facility]  [phone]

## 2025-03-04 NOTE — PLAN OF CARE
Goal Outcome Evaluation:  Heart Failure Care Map  GOALS TO BE MET BEFORE DISCHARGE:    1. Decrease congestion and/or edema with diuretic therapy to achieve near optimal volume status.     Dyspnea improved: Yes, satisfactory for discharge.   Edema improved: Yes, satisfactory for discharge.        Last 24 hour I/O:   Intake/Output Summary (Last 24 hours) at 3/4/2025 0748  Last data filed at 3/4/2025 0600  Gross per 24 hour   Intake 540 ml   Output 300 ml   Net 240 ml           Net I/O and Weights since admission:   02/02 1500 - 03/04 1459  In: 1920 [P.O.:1920]  Out: 5975 [Urine:5975]  Net: -4055     Vitals:    03/01/25 0600 03/02/25 0500 03/02/25 1055 03/03/25 0533   Weight: 69.3 kg (152 lb 11.2 oz) 65.4 kg (144 lb 2.9 oz) 66 kg (145 lb 8 oz) 67 kg (147 lb 12.8 oz)    03/04/25 0646   Weight: 66.6 kg (146 lb 14.4 oz)       2.  O2 sats > 90% on room air, or at prior home O2 therapy level.      Able to wean O2 this shift to keep sats above 90%?: Yes, satisfactory for discharge.   Does patient use Home O2? No          Current oxygenation status:   SpO2: 95 %     O2 Device: None (Room air),      3.  Tolerates ambulation and mobility near baseline.     Ambulation: Yes, satisfactory for discharge.   Times patient ambulated with staff this shift: 3    Please review the Heart Failure Care Map for additional HF goal outcomes.  ~Care plan-end of shift note:    -~Orientation/Mentation:A&O x4  -~VS: VSS on RA  -~LS/Pulm:Ls clear  -~Tele/Cardiac:SR w/1st degree AVB+PVC  -~GI:WDL  -~:WDL  -~Pain:denies   -~Mobility:up w/SBA  -~Skin:intat ex   -~Diet:low saturated fat Na < 2400 mg  -~LInes/IVs:PIV SL  -~Safety/Concern:fall risk  -~Aggression color:green  -~Plan/Shift summary/Goals:denies SOB/CP. Plans for discharge pending       Jim Ladd RN  3/4/2025

## 2025-03-04 NOTE — PROGRESS NOTES
Heart Failure Care Map  GOALS TO BE MET BEFORE DISCHARGE:    1. Decrease congestion and/or edema with diuretic therapy to achieve near optimal volume status.     Dyspnea improved: Yes, satisfactory for discharge.   Edema improved: Yes, satisfactory for discharge.        Last 24 hour I/O:   Intake/Output Summary (Last 24 hours) at 3/4/2025 1637  Last data filed at 3/4/2025 0813  Gross per 24 hour   Intake 540 ml   Output --   Net 540 ml           Net I/O and Weights since admission:   02/02 2300 - 03/04 2259  In: 2370 [P.O.:2370]  Out: 5975 [Urine:5975]  Net: -3605     Vitals:    03/01/25 0600 03/02/25 0500 03/02/25 1055 03/03/25 0533   Weight: 69.3 kg (152 lb 11.2 oz) 65.4 kg (144 lb 2.9 oz) 66 kg (145 lb 8 oz) 67 kg (147 lb 12.8 oz)    03/04/25 0646   Weight: 66.6 kg (146 lb 14.4 oz)       2.  O2 sats > 90% on room air, or at prior home O2 therapy level.      Able to wean O2 this shift to keep sats above 90%?: Yes, satisfactory for discharge.   Does patient use Home O2? No          Current oxygenation status:   SpO2: 94 %     O2 Device: None (Room air),      3.  Tolerates ambulation and mobility near baseline.     Ambulation: Yes, satisfactory for discharge.   Times patient ambulated with staff this shift: 2    Please review the Heart Failure Care Map for additional HF goal outcomes.    Telma Turpin RN  3/4/2025      A&O x4. VSS on room air. Tele SR w/ 1 degree AVB. Denies CP/SOB/pain. Up independently. AVS reviewed with Usha (pt wife) over the phone, all questions answered.

## 2025-03-04 NOTE — PLAN OF CARE
Goal Outcome Evaluation:      Plan of Care Reviewed With: patient        A&O x4, forgetful. VSS, O2 stable on RA. Denies pain. Up SBA with gait belt. Voiding spontaneously without difficulty. Ambulated in hallway this evening, no report of shortness of breath or chest pain with activity. Tolerating current diet, good appetite. Metoprolol started today. Plan of care ongoing, labs in AM.

## 2025-03-04 NOTE — PROGRESS NOTES
[facility]  Cardiology Progress Note    Date of Service (when I saw the patient): 03/04/2025  Primary Cardiologist: no prior cardiology care       Interval History:   No SOB/CP with cardiac rehab yesterday. Renal function is better today.    ----------------------------------------------------------------------------------------    Assessment:  Jose Ray is a 89 year old male who was admitted on 2/28/2025 with weight gain, SOB, and peripheral edema.     # Acute HFrEF/new systolic CM  -- LVEF 35-40% with global hypokinesis   -- initiated on BB on 3/3  --     # Mild Troponin rise   -- flat in pattern, no ECG changes, no WMA on TTE  -- likely demand ischemia    # CKD stage 3 with mild NORBERT following IV diuresis, improved    # Remote history of prostate cancer, status post-prostatectomy.  # Remote DVT on chronic rivaroxaban    ----------------------------------------------------------------------------------------    Plan:  -- Continue with metoprolol succinate 25 mg daily  -- Renal function better- initiate furosemide 20 mg daily  -- We will arrange outpatient follow up with plans for initiation of afterload reducing agent (Held on current admission due to NORBERT)  -- Cardiology will sign off     ----------------------------------------------------------------------------------------  Physical Exam   Temp: 97.5  F (36.4  C) Temp src: Oral BP: 115/78 Pulse: 74   Resp: 17 SpO2: 94 % O2 Device: None (Room air)    Vitals:    03/02/25 1055 03/03/25 0533 03/04/25 0646   Weight: 66 kg (145 lb 8 oz) 67 kg (147 lb 12.8 oz) 66.6 kg (146 lb 14.4 oz)     GEN:  NAD  HEENT: Mucous membranes moist.  NECK:  No JVD.  C/V:  Regular rate and rhythm, no murmur, rub or gallop.   RESP: Respirations are unlabored. No use of accessory muscles. Clear to auscultation bilaterally without wheezing, rales, or rhonchi.  GI: Abdomen soft, nontender, nondistended.    EXTREM: No pitting LE edema.   NEURO: Alert and  oriented, cooperative.   PSYCH: Normal affect.  SKIN: Warm and dry.   VASC: 2+ radial and dorsalis pedis pulses bilaterally.      Medications   Current Facility-Administered Medications   Medication Dose Route Frequency Provider Last Rate Last Admin    Patient is already receiving anticoagulation with heparin, enoxaparin (LOVENOX), warfarin (COUMADIN)  or other anticoagulant medication   Does not apply Continuous PRN Mayo Rodriguez PA-C        Reason beta blocker not prescribed   Other DOES NOT GO TO MAR Mayo Rodriguez PA-C         Current Facility-Administered Medications   Medication Dose Route Frequency Provider Last Rate Last Admin    metoprolol succinate ER (TOPROL XL) 24 hr tablet 25 mg  25 mg Oral Daily Lionel Melissa PA-C   25 mg at 03/04/25 0816    [Held by provider] rivaroxaban ANTICOAGULANT (XARELTO) tablet 20 mg  20 mg Oral Daily with supper Mayo Rodriguez PA-C   20 mg at 03/01/25 1716    sodium chloride (PF) 0.9% PF flush 3 mL  3 mL Intracatheter Q8H Mayo Rodriguez PA-C   3 mL at 03/04/25 0725       Data   Reviewed      Lionel Melissa PA-C   3/4/2025  Pager: Guillermo

## 2025-03-05 ENCOUNTER — PATIENT OUTREACH (OUTPATIENT)
Dept: CARE COORDINATION | Facility: CLINIC | Age: 89
End: 2025-03-05
Payer: COMMERCIAL

## 2025-03-05 NOTE — PLAN OF CARE
Occupational Therapy Discharge Summary    Reason for therapy discharge:    Discharged to home with outpatient therapy.    Progress towards therapy goal(s). See goals on Care Plan in [facility] electronic health record for goal details.  Goals partially met.  Barriers to achieving goals:   discharge from facility.    Therapy recommendation(s):    Continued therapy is recommended.  Rationale/Recommendations:  Per treating therapist, pt would benefit from continued outpatient PT. See OT note for details.

## 2025-03-05 NOTE — PROGRESS NOTES
Clinic Care Coordination Contact  Transitions of Care Outreach  Chief Complaint   Patient presents with    Clinic Care Coordination - Post Hospital       Most Recent Admission Date: 2/28/2025   Most Recent Admission Diagnosis: Acute on chronic congestive heart failure, unspecified heart failure type (H) - I50.9     Most Recent Discharge Date: 3/4/2025   Most Recent Discharge Diagnosis: Acute on chronic congestive heart failure, unspecified heart failure type (H) - I50.9  Stage 3a chronic kidney disease (H) - N18.31     Transitions of Care Assessment    Discharge Assessment  How are you doing now that you are home?: Doing fine.  How are your symptoms? (Red Flag symptoms escalate to triage hotline per guidelines): Improved  Do you know how to contact your clinic care team if you have future questions or changes to your health status? : Yes  Does the patient have their discharge instructions? : Yes  Does the patient have questions regarding their discharge instructions? : No  Were you started on any new medications or were there changes to any of your previous medications? : Yes  Does the patient have all of their medications?: Yes  Do you have questions regarding any of your medications? : No  Do you have all of your needed medical supplies or equipment (DME)?  (i.e. oxygen tank, CPAP, cane, etc.): Yes         Post-op (Clinicians Only)  Did the patient have surgery or a procedure: No  Fever: No  Chills: No  Eating & Drinking: eating and drinking without complaints/concerns  PO Intake: regular diet  Additional Symptoms:  (Denies)  Bowel Function: normal  Urinary Status: voiding without complaint/concerns    Care Management   None    Follow up Plan     Patient declined Care Coordination services at this time.   Patient is aware of how to initiate Care Coordination services with the clinic in the future.     Discharge Follow-Up  Discharge follow up appointment scheduled in alignment with recommended follow up timeframe  or Transitions of Risk Category? (Low = within 30 days; Moderate= within 14 days; High= within 7 days): Yes  Discharge Follow Up Appointment Date: 03/10/25  Discharge Follow Up Appointment Scheduled with?: Primary Care Provider    Future Appointments   Date Time Provider Department Center   3/10/2025  1:30 PM Shavonne Kwan DNP [facility]   3/20/2025 11:20 AM Lionel Melissa PA-C [facility]   10/2/2025 10:30 AM Hosea Lockett MD [facility]       Outpatient Plan as outlined on AVS reviewed with patient.    For any urgent concerns, please contact our 24 hour nurse triage line: [phone] ([phone])       Telma Martinez RN

## 2025-03-06 ENCOUNTER — TELEPHONE (OUTPATIENT)
Dept: CARDIOLOGY | Facility: CLINIC | Age: 89
End: 2025-03-06

## 2025-03-06 ENCOUNTER — LAB (OUTPATIENT)
Dept: LAB | Facility: CLINIC | Age: 89
End: 2025-03-06
Payer: COMMERCIAL

## 2025-03-06 DIAGNOSIS — N18.31 STAGE 3A CHRONIC KIDNEY DISEASE (H): ICD-10-CM

## 2025-03-06 LAB
ANION GAP SERPL CALCULATED.3IONS-SCNC: 10 MMOL/L (ref 7–15)
BUN SERPL-MCNC: 49.8 MG/DL (ref 8–23)
CALCIUM SERPL-MCNC: 9.8 MG/DL (ref 8.8–10.4)
CHLORIDE SERPL-SCNC: 104 MMOL/L (ref 98–107)
CREAT SERPL-MCNC: 1.56 MG/DL (ref 0.67–1.17)
EGFRCR SERPLBLD CKD-EPI 2021: 42 ML/MIN/1.73M2
GLUCOSE SERPL-MCNC: 124 MG/DL (ref 70–99)
HCO3 SERPL-SCNC: 28 MMOL/L (ref 22–29)
POTASSIUM SERPL-SCNC: 4.5 MMOL/L (ref 3.4–5.3)
SODIUM SERPL-SCNC: 142 MMOL/L (ref 135–145)

## 2025-03-06 NOTE — TELEPHONE ENCOUNTER
Patient was admitted to [facility] on 2/28/25 with weight gain, SOB, and peripheral edema. Acute HFrEF with new systolic CMY.    PMH: hypertension, stage III CKD, chronic LE edema, hx of DVT on chronic anticoagulation and hx of prostate cancer.    2/23/25: Echo showed EF of 35-40% with global hypokinesis of the LV; moderately decreased RVSF; mild MR, trace TR.     IV Lasix diuresed.    Pt was started on Lasix and Metoprolol at time of discharge.    Called patient to discuss any post hospital d/c questions, review discharge medication, and confirm f/u appts. Patient denied any questions regarding new or changes to PTA medications.     Patient denied any SOB, chest pain, edema, or light headedness.    RN confirmed with patient that he is scheduled for an OV on 3/20/25 at 1110 with VAL Lionel Marques at our [place] Office. Dr. Ballard's Team RN phone number provided.    Instructed patient to weigh self every AM, after waking and using the restroom, but before breakfast and medications. Call clinic for a weight gain of 2 lbs overnight or 5 lbs in a week. Low Na+ diet encouraged. Pt instructed to bring daily wt/BP diary and medications with to f/u OV.     Patient advised to call clinic with any cardiac related questions or concerns prior to his appt. Patient verbalized understanding and agreed with plan. JOB Oneill RN.

## 2025-03-10 ENCOUNTER — OFFICE VISIT (OUTPATIENT)
Dept: FAMILY MEDICINE | Facility: CLINIC | Age: 89
End: 2025-03-10
Attending: HOSPITALIST
Payer: COMMERCIAL

## 2025-03-10 VITALS
HEART RATE: 61 BPM | SYSTOLIC BLOOD PRESSURE: 115 MMHG | HEIGHT: 66 IN | WEIGHT: 149.2 LBS | OXYGEN SATURATION: 100 % | RESPIRATION RATE: 16 BRPM | TEMPERATURE: 97.5 F | BODY MASS INDEX: 23.98 KG/M2 | DIASTOLIC BLOOD PRESSURE: 69 MMHG

## 2025-03-10 DIAGNOSIS — N18.31 STAGE 3A CHRONIC KIDNEY DISEASE (H): ICD-10-CM

## 2025-03-10 DIAGNOSIS — I50.9 CHRONIC HEART FAILURE, UNSPECIFIED HEART FAILURE TYPE (H): ICD-10-CM

## 2025-03-10 DIAGNOSIS — Z09 HOSPITAL DISCHARGE FOLLOW-UP: Primary | ICD-10-CM

## 2025-03-10 PROCEDURE — 3078F DIAST BP <80 MM HG: CPT | Performed by: NURSE PRACTITIONER

## 2025-03-10 PROCEDURE — 1111F DSCHRG MED/CURRENT MED MERGE: CPT | Performed by: NURSE PRACTITIONER

## 2025-03-10 PROCEDURE — 3074F SYST BP LT 130 MM HG: CPT | Performed by: NURSE PRACTITIONER

## 2025-03-10 PROCEDURE — 1126F AMNT PAIN NOTED NONE PRSNT: CPT | Performed by: NURSE PRACTITIONER

## 2025-03-10 PROCEDURE — 99495 TRANSJ CARE MGMT MOD F2F 14D: CPT | Performed by: NURSE PRACTITIONER

## 2025-03-10 RX ORDER — FUROSEMIDE 20 MG/1
10 TABLET ORAL DAILY
Qty: 30 TABLET | Refills: 0 | Status: SHIPPED | OUTPATIENT
Start: 2025-03-10

## 2025-03-10 ASSESSMENT — PAIN SCALES - GENERAL: PAINLEVEL_OUTOF10: NO PAIN (0)

## 2025-03-10 NOTE — PATIENT INSTRUCTIONS
Cut lasix dose in half from 20mg to 10mg. Continue to monitor symptoms. If any symptoms return you can go back to 20mg before your cardiology appt next week.

## 2025-03-10 NOTE — PROGRESS NOTES
Assessment & Plan     Hospital discharge follow-up  Doing well since discharge for new CHF. On lasix. Renal function worsened slightly so recommend cutting dose from 20mg to 10mg.  Discussed with the wife who is an RN that they can go back up to 20 mg if they notice any weight gain at all or breathing symptoms. They have follow-up in 10 days with cardiology so that will be perfect timing.  Contact us with any new concerns in the meantime    Stage 3a chronic kidney disease (H)  Plans for follow-up with nephrology     Chronic heart failure, unspecified heart failure type (H)  As above   - furosemide (LASIX) 20 MG tablet; Take 0.5 tablets (10 mg) by mouth daily.        MED REC REQUIRED  Post Medication Reconciliation Status: discharge medications reconciled and changed, per note/orders        Subjective   Salvador is a 89 year old, presenting for the following health issues:  Hospital F/U    HPI          3/5/2025   Post Discharge Outreach   How are you doing now that you are home? Doing fine.   How are your symptoms? (Red Flag symptoms escalate to triage hotline per guidelines) Improved   Does the patient have their discharge instructions?  Yes   Does the patient have questions regarding their discharge instructions?  No   Were you started on any new medications or were there changes to any of your previous medications?  Yes   Does the patient have all of their medications? Yes   Do you have questions regarding any of your medications?  No   Do you have all of your needed medical supplies or equipment (DME)?  (i.e. oxygen tank, CPAP, cane, etc.) Yes   Discharge Follow Up Appointment Date 3/10/2025   Discharge Follow Up Appointment Scheduled with? Primary Care Provider       Hospital Follow-up Visit:    Hospital/Nursing Home/IP Rehab Facility: [facility]  Date of Admission: 2-  Date of Discharge: 3-4-2025  Reason(s) for Admission: Acute exacerbation of newly diagnosed HFrEF  Pulmonary  "edema, bilateral pleural effusions: Improved  Acute on chronic lower extremity edema: Improved  Suspected type 2 MI, demand ischemia dt CHF   Hypertension,      Summary of hospitalization:  [facility] hospital discharge summary reviewed  Diagnostic Tests/Treatments reviewed.  Follow up needed: bmp done  Other Healthcare Providers Involved in Patient s Care:         Specialist appointment - cards neph  Update since discharge: improved.         Plan of care communicated with patient and family           Recently hospitalized for SOB with new dx CHF   Did think exhaustion from doing a lot played a role   Is monitoring his weights. Down 1 lb from the hospital   Doing well with nutrition   His wife is an RN and she is really on top of everything        Review of Systems  Detailed as above       Objective    /69 (BP Location: Left arm, Patient Position: Sitting, Cuff Size: Adult Regular)   Pulse 61   Temp 97.5  F (36.4  C) (Tympanic)   Resp 16   Ht 1.676 m (5' 6\")   Wt 67.7 kg (149 lb 3.2 oz)   SpO2 100%   BMI 24.08 kg/m    There is no height or weight on file to calculate BMI.  Physical Exam  Constitutional:       Appearance: Normal appearance.   Pulmonary:      Effort: Pulmonary effort is normal.   Musculoskeletal:      Comments: Joselo SWANN   Neurological:      Mental Status: He is alert.   Psychiatric:         Mood and Affect: Mood normal.                    Signed Electronically by: SMITHA Amezcua CNP    "

## 2025-03-18 ENCOUNTER — LAB (OUTPATIENT)
Dept: LAB | Facility: CLINIC | Age: 89
End: 2025-03-18
Payer: COMMERCIAL

## 2025-03-18 DIAGNOSIS — I50.9 ACUTE ON CHRONIC CONGESTIVE HEART FAILURE, UNSPECIFIED HEART FAILURE TYPE (H): ICD-10-CM

## 2025-03-18 PROCEDURE — 36415 COLL VENOUS BLD VENIPUNCTURE: CPT

## 2025-03-18 PROCEDURE — 80048 BASIC METABOLIC PNL TOTAL CA: CPT

## 2025-03-19 LAB
ANION GAP SERPL CALCULATED.3IONS-SCNC: 6 MMOL/L (ref 7–15)
BUN SERPL-MCNC: 50.9 MG/DL (ref 8–23)
CALCIUM SERPL-MCNC: 9.2 MG/DL (ref 8.8–10.4)
CHLORIDE SERPL-SCNC: 104 MMOL/L (ref 98–107)
CREAT SERPL-MCNC: 1.67 MG/DL (ref 0.67–1.17)
EGFRCR SERPLBLD CKD-EPI 2021: 39 ML/MIN/1.73M2
GLUCOSE SERPL-MCNC: 157 MG/DL (ref 70–99)
HCO3 SERPL-SCNC: 29 MMOL/L (ref 22–29)
POTASSIUM SERPL-SCNC: 4.7 MMOL/L (ref 3.4–5.3)
SODIUM SERPL-SCNC: 139 MMOL/L (ref 135–145)

## 2025-04-02 DIAGNOSIS — I50.9 ACUTE ON CHRONIC CONGESTIVE HEART FAILURE, UNSPECIFIED HEART FAILURE TYPE (H): ICD-10-CM

## 2025-04-02 RX ORDER — METOPROLOL SUCCINATE 25 MG/1
25 TABLET, EXTENDED RELEASE ORAL DAILY
Qty: 90 TABLET | Refills: 4 | Status: SHIPPED | OUTPATIENT
Start: 2025-04-02

## 2025-04-16 ENCOUNTER — LAB (OUTPATIENT)
Dept: LAB | Facility: CLINIC | Age: 89
End: 2025-04-16
Payer: COMMERCIAL

## 2025-04-16 DIAGNOSIS — I50.9 ACUTE ON CHRONIC CONGESTIVE HEART FAILURE, UNSPECIFIED HEART FAILURE TYPE (H): ICD-10-CM

## 2025-04-16 LAB
ANION GAP SERPL CALCULATED.3IONS-SCNC: 9 MMOL/L (ref 7–15)
BUN SERPL-MCNC: 38.8 MG/DL (ref 8–23)
CALCIUM SERPL-MCNC: 9.5 MG/DL (ref 8.8–10.4)
CHLORIDE SERPL-SCNC: 105 MMOL/L (ref 98–107)
CREAT SERPL-MCNC: 1.47 MG/DL (ref 0.67–1.17)
EGFRCR SERPLBLD CKD-EPI 2021: 45 ML/MIN/1.73M2
GLUCOSE SERPL-MCNC: 105 MG/DL (ref 70–99)
HCO3 SERPL-SCNC: 26 MMOL/L (ref 22–29)
POTASSIUM SERPL-SCNC: 4.9 MMOL/L (ref 3.4–5.3)
SODIUM SERPL-SCNC: 140 MMOL/L (ref 135–145)

## 2025-04-16 PROCEDURE — 36415 COLL VENOUS BLD VENIPUNCTURE: CPT

## 2025-04-16 PROCEDURE — 80048 BASIC METABOLIC PNL TOTAL CA: CPT

## 2025-05-22 ENCOUNTER — HOSPITAL ENCOUNTER (OUTPATIENT)
Dept: CARDIOLOGY | Facility: CLINIC | Age: 89
End: 2025-05-22
Attending: NURSE PRACTITIONER
Payer: COMMERCIAL

## 2025-05-22 VITALS — DIASTOLIC BLOOD PRESSURE: 70 MMHG | HEART RATE: 59 BPM | SYSTOLIC BLOOD PRESSURE: 126 MMHG

## 2025-05-22 DIAGNOSIS — I50.22 CHRONIC SYSTOLIC HEART FAILURE (H): ICD-10-CM

## 2025-05-22 DIAGNOSIS — I42.9 CARDIOMYOPATHY, UNSPECIFIED TYPE (H): ICD-10-CM

## 2025-05-22 PROCEDURE — A9585 GADOBUTROL INJECTION: HCPCS | Performed by: NURSE PRACTITIONER

## 2025-05-22 PROCEDURE — 93017 CV STRESS TEST TRACING ONLY: CPT

## 2025-05-22 PROCEDURE — 255N000002 HC RX 255 OP 636: Performed by: NURSE PRACTITIONER

## 2025-05-22 PROCEDURE — 250N000011 HC RX IP 250 OP 636: Mod: JZ | Performed by: INTERNAL MEDICINE

## 2025-05-22 RX ORDER — CAFFEINE 200 MG
200 TABLET ORAL
Status: ACTIVE | OUTPATIENT
Start: 2025-05-22 | End: 2025-05-22

## 2025-05-22 RX ORDER — AMINOPHYLLINE 25 MG/ML
50-100 INJECTION, SOLUTION INTRAVENOUS
Status: ACTIVE | OUTPATIENT
Start: 2025-05-22

## 2025-05-22 RX ORDER — REGADENOSON 0.08 MG/ML
0.4 INJECTION, SOLUTION INTRAVENOUS ONCE
Status: COMPLETED | OUTPATIENT
Start: 2025-05-22 | End: 2025-05-22

## 2025-05-22 RX ORDER — ALBUTEROL SULFATE 90 UG/1
2 INHALANT RESPIRATORY (INHALATION) EVERY 5 MIN PRN
Status: ACTIVE | OUTPATIENT
Start: 2025-05-22

## 2025-05-22 RX ORDER — DIAZEPAM 5 MG/1
5 TABLET ORAL EVERY 30 MIN PRN
Status: ACTIVE | OUTPATIENT
Start: 2025-05-22

## 2025-05-22 RX ORDER — SODIUM CHLORIDE 9 MG/ML
INJECTION, SOLUTION INTRAVENOUS CONTINUOUS PRN
Status: ACTIVE | OUTPATIENT
Start: 2025-05-22 | End: 2025-05-22

## 2025-05-22 RX ORDER — NITROGLYCERIN 0.4 MG/1
0.4 TABLET SUBLINGUAL EVERY 5 MIN PRN
Status: ACTIVE | OUTPATIENT
Start: 2025-05-22 | End: 2025-05-22

## 2025-05-22 RX ORDER — LORAZEPAM 0.5 MG/1
0.5 TABLET ORAL EVERY 30 MIN PRN
Status: ACTIVE | OUTPATIENT
Start: 2025-05-22

## 2025-05-22 RX ORDER — GADOBUTROL 604.72 MG/ML
20 INJECTION INTRAVENOUS ONCE
Status: COMPLETED | OUTPATIENT
Start: 2025-05-22 | End: 2025-05-22

## 2025-05-22 RX ORDER — LIDOCAINE 40 MG/G
CREAM TOPICAL
OUTPATIENT
Start: 2025-05-22

## 2025-05-22 RX ORDER — CAFFEINE CITRATE 20 MG/ML
60 SOLUTION INTRAVENOUS
Status: ACTIVE | OUTPATIENT
Start: 2025-05-22 | End: 2025-05-22

## 2025-05-22 RX ADMIN — REGADENOSON 0.4 MG: 0.08 INJECTION, SOLUTION INTRAVENOUS at 12:34

## 2025-05-22 RX ADMIN — GADOBUTROL 20 ML: 604.72 INJECTION INTRAVENOUS at 13:27

## 2025-05-27 ENCOUNTER — RESULTS FOLLOW-UP (OUTPATIENT)
Dept: CARDIOLOGY | Facility: CLINIC | Age: 89
End: 2025-05-27
Payer: COMMERCIAL

## 2025-05-28 NOTE — TELEPHONE ENCOUNTER
Called and spoke with patient regarding recent cardiac MRI results and recommendations per Chantal Espino NP as below. Pt overall is feeling well with no cardiac concerns at this point. Pt in agreement to contact clinic if any new/worsening symptoms and to defer PYP scan at this time. Appt scheduled with Dr. Mahmood for 9/12. Patient states understanding and agreement with plan. Patient agrees to call with any new or worsening symptoms.     Results of the cardiac MRI fortunately showed no ischemia. His LV function remains mildly reduced with an EF of 43% with mildly reduced RV function. Cardiac amyloidosis cannot be ruled out, though I would defer further testing with PYP scan if patient continues to feel well. We should also get him set up with Dr. Mahmood in 2 to 3 months.

## 2025-06-05 DIAGNOSIS — E55.9 VITAMIN D DEFICIENCY: ICD-10-CM

## 2025-06-05 DIAGNOSIS — D64.9 ANEMIA: ICD-10-CM

## 2025-06-05 DIAGNOSIS — N18.31 STAGE 3A CHRONIC KIDNEY DISEASE (H): Primary | ICD-10-CM

## 2025-06-16 ENCOUNTER — LAB (OUTPATIENT)
Dept: LAB | Facility: CLINIC | Age: 89
End: 2025-06-16
Payer: COMMERCIAL

## 2025-06-16 DIAGNOSIS — D64.9 ANEMIA: ICD-10-CM

## 2025-06-16 DIAGNOSIS — N18.31 STAGE 3A CHRONIC KIDNEY DISEASE (H): ICD-10-CM

## 2025-06-16 DIAGNOSIS — E55.9 VITAMIN D DEFICIENCY: ICD-10-CM

## 2025-06-16 LAB
ALBUMIN MFR UR ELPH: 44.9 MG/DL
ALBUMIN SERPL BCG-MCNC: 4.1 G/DL (ref 3.5–5.2)
ALBUMIN UR-MCNC: 30 MG/DL
ANION GAP SERPL CALCULATED.3IONS-SCNC: 11 MMOL/L (ref 7–15)
APPEARANCE UR: CLEAR
BILIRUB UR QL STRIP: NEGATIVE
BUN SERPL-MCNC: 31.7 MG/DL (ref 8–23)
CALCIUM SERPL-MCNC: 9.4 MG/DL (ref 8.8–10.4)
CHLORIDE SERPL-SCNC: 103 MMOL/L (ref 98–107)
COLOR UR AUTO: YELLOW
CREAT SERPL-MCNC: 1.45 MG/DL (ref 0.67–1.17)
CREAT UR-MCNC: 162 MG/DL
CREAT UR-MCNC: 162 MG/DL
EGFRCR SERPLBLD CKD-EPI 2021: 46 ML/MIN/1.73M2
ERYTHROCYTE [DISTWIDTH] IN BLOOD BY AUTOMATED COUNT: 14.5 % (ref 10–15)
FERRITIN SERPL-MCNC: 253 NG/ML (ref 31–409)
GLUCOSE SERPL-MCNC: 119 MG/DL (ref 70–99)
GLUCOSE UR STRIP-MCNC: NEGATIVE MG/DL
HCO3 SERPL-SCNC: 25 MMOL/L (ref 22–29)
HCT VFR BLD AUTO: 41.6 % (ref 40–53)
HGB BLD-MCNC: 13.9 G/DL (ref 13.3–17.7)
HGB UR QL STRIP: ABNORMAL
IRON BINDING CAPACITY (ROCHE): 243 UG/DL (ref 240–430)
IRON SATN MFR SERPL: 14 % (ref 15–46)
IRON SERPL-MCNC: 34 UG/DL (ref 61–157)
KETONES UR STRIP-MCNC: NEGATIVE MG/DL
LEUKOCYTE ESTERASE UR QL STRIP: NEGATIVE
MCH RBC QN AUTO: 32.8 PG (ref 26.5–33)
MCHC RBC AUTO-ENTMCNC: 33.4 G/DL (ref 31.5–36.5)
MCV RBC AUTO: 98 FL (ref 78–100)
MICROALBUMIN UR-MCNC: 175 MG/L
MICROALBUMIN/CREAT UR: 108.02 MG/G CR (ref 0–17)
NITRATE UR QL: NEGATIVE
PH UR STRIP: 6 [PH] (ref 5–7)
PHOSPHATE SERPL-MCNC: 4.1 MG/DL (ref 2.5–4.5)
PLATELET # BLD AUTO: 190 10E3/UL (ref 150–450)
POTASSIUM SERPL-SCNC: 5 MMOL/L (ref 3.4–5.3)
PROT/CREAT 24H UR: 0.28 MG/MG CR (ref 0–0.2)
PTH-INTACT SERPL-MCNC: 77 PG/ML (ref 15–65)
RBC # BLD AUTO: 4.24 10E6/UL (ref 4.4–5.9)
RBC #/AREA URNS AUTO: NORMAL /HPF
SODIUM SERPL-SCNC: 139 MMOL/L (ref 135–145)
SP GR UR STRIP: 1.02 (ref 1–1.03)
UROBILINOGEN UR STRIP-ACNC: 0.2 E.U./DL
VIT D+METAB SERPL-MCNC: 57 NG/ML (ref 20–50)
WBC # BLD AUTO: 7.4 10E3/UL (ref 4–11)
WBC #/AREA URNS AUTO: NORMAL /HPF

## 2025-06-16 PROCEDURE — 36415 COLL VENOUS BLD VENIPUNCTURE: CPT

## 2025-06-16 PROCEDURE — 83540 ASSAY OF IRON: CPT

## 2025-06-16 PROCEDURE — 82728 ASSAY OF FERRITIN: CPT

## 2025-06-16 PROCEDURE — 82306 VITAMIN D 25 HYDROXY: CPT

## 2025-06-16 PROCEDURE — 82043 UR ALBUMIN QUANTITATIVE: CPT

## 2025-06-16 PROCEDURE — 82570 ASSAY OF URINE CREATININE: CPT

## 2025-06-16 PROCEDURE — 81001 URINALYSIS AUTO W/SCOPE: CPT

## 2025-06-16 PROCEDURE — 84156 ASSAY OF PROTEIN URINE: CPT

## 2025-06-16 PROCEDURE — 83970 ASSAY OF PARATHORMONE: CPT

## 2025-06-16 PROCEDURE — 85027 COMPLETE CBC AUTOMATED: CPT

## 2025-06-16 PROCEDURE — 83550 IRON BINDING TEST: CPT

## 2025-06-16 PROCEDURE — 80069 RENAL FUNCTION PANEL: CPT

## 2025-06-18 ENCOUNTER — OFFICE VISIT (OUTPATIENT)
Dept: NEPHROLOGY | Facility: CLINIC | Age: 89
End: 2025-06-18
Payer: COMMERCIAL

## 2025-06-18 ENCOUNTER — LAB (OUTPATIENT)
Dept: LAB | Facility: CLINIC | Age: 89
End: 2025-06-18
Payer: COMMERCIAL

## 2025-06-18 VITALS
OXYGEN SATURATION: 98 % | BODY MASS INDEX: 23.23 KG/M2 | HEART RATE: 69 BPM | DIASTOLIC BLOOD PRESSURE: 63 MMHG | WEIGHT: 143.9 LBS | SYSTOLIC BLOOD PRESSURE: 99 MMHG

## 2025-06-18 DIAGNOSIS — I50.9 ACUTE ON CHRONIC CONGESTIVE HEART FAILURE, UNSPECIFIED HEART FAILURE TYPE (H): Primary | ICD-10-CM

## 2025-06-18 DIAGNOSIS — N18.31 STAGE 3A CHRONIC KIDNEY DISEASE (H): ICD-10-CM

## 2025-06-18 LAB — TOTAL PROTEIN SERUM FOR ELP: 6.2 G/DL (ref 6.4–8.3)

## 2025-06-18 PROCEDURE — 3074F SYST BP LT 130 MM HG: CPT | Performed by: INTERNAL MEDICINE

## 2025-06-18 PROCEDURE — 83521 IG LIGHT CHAINS FREE EACH: CPT

## 2025-06-18 PROCEDURE — 86334 IMMUNOFIX E-PHORESIS SERUM: CPT | Performed by: PATHOLOGY

## 2025-06-18 PROCEDURE — 1126F AMNT PAIN NOTED NONE PRSNT: CPT | Performed by: INTERNAL MEDICINE

## 2025-06-18 PROCEDURE — 36415 COLL VENOUS BLD VENIPUNCTURE: CPT

## 2025-06-18 PROCEDURE — 3078F DIAST BP <80 MM HG: CPT | Performed by: INTERNAL MEDICINE

## 2025-06-18 PROCEDURE — 99205 OFFICE O/P NEW HI 60 MIN: CPT | Performed by: INTERNAL MEDICINE

## 2025-06-18 PROCEDURE — 84155 ASSAY OF PROTEIN SERUM: CPT

## 2025-06-18 PROCEDURE — 84165 PROTEIN E-PHORESIS SERUM: CPT | Performed by: PATHOLOGY

## 2025-06-18 ASSESSMENT — PAIN SCALES - GENERAL: PAINLEVEL_OUTOF10: NO PAIN (0)

## 2025-06-18 NOTE — PATIENT INSTRUCTIONS
It was a pleasure taking care of you today.  I've included a brief summary of our discussion and care plan from today's visit below.  Please review this information with your primary care provider.    My recommendations are summarized as follows:  -Keep the amount of sodium in your diet at 2.3 g/day (also see instructions attached in that regard)  -Avoid all NSAID's. Examples include Ibuprofen (Advil, Motrin), naprosyn (Aleve), celebrex among others. Acetaminophen (Tylenol) is ok with maximum dose in 24 hours of 3200 mg.  -Do not exceed one alcoholic drink per day.  -Please do additional blood tests  -Please do a kidney ultrasound  -Please do a PYP scan    - Return to Nephrology Clinic in 3 months to review your progress.     To schedule imaging please call [phone]. To schedule your lab appointment at [address] lab call [phone]    Who do I call with any questions after my visit?  Please be in touch if there are any further questions that arise following today's visit.  There are multiple ways to contact your nephrology care team.      During business hours, you may reach your Nephrology RN Care Coordinator, Juanita Ervin at . To schedule or reschedule an appointment, please call [phone].    You can always send a secure message through [employer].  [employer] messages are answered by your nurse or doctor typically within 24 hours.  Please allow extra time on weekends and holidays.      For urgent/emergent questions after business hours, you may reach the on-call Nephrology Fellow by contacting the [facility]  at [phone].     How will I get the results of any tests ordered?    You will receive all of your results.  If you have signed up for [employer], any tests ordered at your visit will be available to you after your physician reviews them.  Typically this takes 1-2 weeks.  If there are urgent results that require a change in your care plan, your  physician or nurse will call you to discuss the next steps.      What is [employer]?  [employer] ([employer]) is a secure way for you to access all of your healthcare records from the [facility].  It is a web based computer program, so you can sign on to it from any location.  It also allows you to send secure messages to your care team.  I recommend signing up for [employer] ([employer] access if you have not already done so and are comfortable with using a computer.      How do I schedule a follow-up visit?  If you did not schedule a follow-up visit today, please call [phone] to schedule a follow-up office visit.      Sincerely,    Sara Lombardo MD  [facility]  Division of Nephrology and Hypertension

## 2025-06-18 NOTE — PROGRESS NOTES
Nephrology Clinic    Jose Ray MRN:[id number] YOB: 1935  Date of Service: 06/18/2025  Primary care provider: Hosea Lockett  Requesting physician: Scarlett Nichols MD    REASON FOR CONSULT: CKD    HISTORY OF PRESENT ILLNESS:   Jose Ray is a 89 year old male who presents for evaluation of CKD.  The past medical history is significant for systolic CHF  newly diagnosed in February 2025, hypertension, prostate cancer, DVT on anti-coagulation and CKD.  The patient was diagnosed with new onset of congestive heart failure in 2/2025.  Echocardiogram showed EF of 35 to 40% with global hypokinesis.  He was initiated on a beta-blocker.  He had mild troponin elevation, felt to be demand ischemia in the setting of heart failure.  He also received furosemide but it was stopped after he developed severe hypotension and worsening kidney dysfunction. He is currently off furosemide and on metoprolol succinate 25 mg daily only. A cardiac MRI done in May 2025 shows mildly reduced left and right ventricle systolic function with diffuse subendocardial enhancement in the lateral mid to basal septal and anterior segments.  From a renal standpoint, the patient has had CKD since at least 2019 and had an episode of obstructive lithiasis in 2021 with a creatinine level that peaked at 2.8 mg/dL and then improved to 1.1 mg/dL. However since September 2024, his creatinine level has been slowly rising in the setting of low blood pressure that mandated stopping all his BP meds and since his new diagnosis of heart failure has been fluctuating between 1.4 and 1.6 mg/dL. The uACR is 108 mg/g Cr on 6/16/2025 while the uPCR is 0.28 mg/mg Cr on 6/16/2025. There are no previous values available. There is no recent renal imaging. The patient has lost around 20 lbs since February 2025.   The patient denies any dysuria, any pollakiuria, any nocturia,  any dyspnea on exertion but reports mild LE edema. 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  The following portions of the patient's history were reviewed and updated as appropriate: allergies, current medications, past family history, past medical history, past social history, past surgical history and problem list.  EXAM: CT ABDOMEN PELVIS W CONTRAST  LOCATION: [facility]  DATE/TIME: 6/12/2021 11:03 PM     INDICATION: Abdominal distension.  COMPARISON: 10/14/2010.  TECHNIQUE: CT scan of the abdomen and pelvis was performed following injection of IV contrast. Multiplanar reformats were obtained. Dose reduction techniques were used.  CONTRAST: 86 mL [id number].     FINDINGS:   LOWER CHEST: Dependent atelectasis at the lung bases.     HEPATOBILIARY: Stones in the gallbladder.     PANCREAS: Normal.     SPLEEN: Normal.     ADRENAL GLANDS: Normal.     KIDNEYS/BLADDER: There is moderate dilatation of the right renal collecting system and ureter into the pelvis where there is a 5 mm distal ureteral stone immediately above the ureterovesical junction. There is delayed nephrogram on the right consistent   with obstruction. Fluid stranding about the right kidney and ureter. Small cortical cyst at the posterior aspect of the right kidney. Minimally complex cyst at the anterior aspect of the left kidney. There is a 6 mm stone in the upper pole of the left   kidney.     BOWEL: There are colonic diverticula without acute diverticulitis. No bowel obstruction or inflammation.     LYMPH NODES: Normal.     VASCULATURE: Atherosclerotic calcification of the aorta and its branches. No aneurysm.     PELVIC ORGANS: Normal.     MUSCULOSKELETAL: Degenerative disease and scoliosis in the spine.                                                                      IMPRESSION:   1.  Moderate right hydronephrosis caused by a 5 mm distal ureteral stone.  2.  Single 6 mm left intrarenal stone.  3.  Cholelithiasis.  4.  Colonic  diverticula without acute diverticulitis. No bowel obstruction or inflammation.  PAST MEDICAL HISTORY:  Past Medical History:   Diagnosis Date    Acute on chronic congestive heart failure, unspecified heart failure type (H) 2/28/2025    Acute renal failure with tubular necrosis 06/2021    due to renal stone and hydro    Adenomatous colon polyp 01/2019    Complex renal cyst 06/2014    seen on us done for htn, fu 6 months, fu done 8/15 and stable; fu 6/18 simple cyst, a bit larger    Diverticular hemorrhage 01/2019    colonic, hosp fsd    DVT, lower extremity, distal, acute, right (H) 02/2020, 1996    gastrocnemius vein in 2020, not sure which vein in 1996, factor 5 leiden neg 2020; lifelong therapy    Eczema     Gallstone 2010    seen on ct for renal stone    Generalized osteoarthrosis, unspecified site     HTN (hypertension) 06/2014    us done and no hydro but renal cyst, had edema with norvasc 5mg, added hctz and stopped norvasc 11/20; stopped norvasc 5/24 due to light headed spell.    Hx of colonoscopy 2011    nl    Hydronephrosis of left kidney 2010    seen on above ct    Nephrolithiasis 2010    seen on ct, hydronephrosis seen as well; had richy and hydro 6/21, stenting done.    Prostate cancer (H) 2008    had surgery, D.r Michelle    Skin cancer, basal cell 2002, 2008    had xrt 2002, mohs surgery 2008    Thrombophlebitis 1985     PAST SURGICAL HISTORY:  Past Surgical History:   Procedure Laterality Date    CATARACT EXTRACTION  09/2024    CIRCUMCISION  03/12/2013    Procedure: CIRCUMCISION;  CIRCUMCISION;  Surgeon: Stevenson Martinez MD;  Location: [facility]    COLONOSCOPY  2011; 2019    Dr. Stevie Serrano [facility]    GENITOURINARY SURGERY      prostatectomy    HERNIA REPAIR  1961    Hernia    LASER HOLMIUM LITHOTRIPSY URETER(S), INSERT STENT, COMBINED Right 06/13/2021    Procedure: Cystoscopy, right ureter stent placement;  Surgeon: Rishi Vincent MD;  Location:  OR    LASER HOLMIUM LITHOTRIPSY URETER(S), INSERT  STENT, COMBINED Right 07/02/2021    Procedure: CYSTOSCOPY, RIGHT URETEROSCOPY, HOLMIUM LASER LITHOTRIPSY, RIGHT URETERAL STENT EXCHANGE;  Surgeon: Stevenson Martinez MD;  Location:  OR    mohs surgery  2008, 2011    basal cell    VASCULAR SURGERY  1985    vein stripping both legs some    [facility] NONSPECIFIC PROCEDURE  14, 36    HERNIORRHAPHY    [facility] NONSPECIFIC PROCEDURE  1985    VEIN STRIPPING     MEDICATIONS:  Prescription Medications as of 6/18/2025         Rx Number Disp Refills Start End Last Dispensed Date Next Fill Date Owning Pharmacy    ascorbic acid 1000 MG TABS tablet  -- --  --       Sig: Take 1,000 mg by mouth daily.    Class: Historical    Route: Oral    Cholecalciferol (VITAMIN D PO)  -- --  --       Sig: Take 1,000 Units by mouth daily     Class: Historical    Route: Oral    ferrous sulfate (FEROSUL) 325 (65 Fe) MG tablet  -- --  --       Sig: Take 325 mg by mouth daily (with breakfast)    Class: Historical    Route: Oral    MAGNESIUM PO  -- --  --       Sig: Take 2 capsules by mouth daily.    Class: Historical    Route: Oral    metoprolol succinate ER (TOPROL XL) 25 MG 24 hr tablet  90 tablet 4 4/2/2025 --   [address]    Sig: Take 1 tablet (25 mg) by mouth daily.    Class: E-Prescribe    Route: Oral    MULTIPLE VITAMIN PO  -- --  --   [facility] [address] [address]    Sig: Take 1 tablet by mouth every morning    Class: Historical    Route: Oral    rivaroxaban ANTICOAGULANT (XARELTO ANTICOAGULANT) 20 MG TABS tablet  90 tablet 3 9/26/2024 --   [address]    Sig: TAKE 1 TABLET (20 MG) BY MOUTH DAILY (WITH DINNER)    Class: E-Prescribe    triamcinolone (KENALOG) 0.1 % external cream  -- --  --   [address]    Sig: Apply topically daily as needed for irritation (Back)     Class: Historical    Route: Topical    vitamin B complex with vitamin C  (STRESS TAB) tablet  -- --  --   [facility] [address] [address]    Sig: Take 1 tablet by mouth daily    Class: Historical    Route: Oral    zinc gluconate 50 MG tablet  -- --  --   [facility] [address] [address]    Sig: Take 50 mg by mouth daily    Class: Historical    Route: Oral           ALLERGIES:    Allergies   Allergen Reactions    Cat Hair [Cats]     Amoxicillin Rash     REVIEW OF SYSTEMS:    A comprehensive review of systems was performed and found to be negative except as described here or above.  SOCIAL HISTORY:   Social History     Socioeconomic History    Marital status:      Spouse name: Not on file    Number of children: 3    Years of education: Not on file    Highest education level: Not on file   Occupational History    Occupation: SPO     Employer: RETIRED   Tobacco Use    Smoking status: Never    Smokeless tobacco: Never   Substance and Sexual Activity    Alcohol use: Yes     Alcohol/week: 0.0 standard drinks of alcohol     Comment: occasional    Drug use: No    Sexual activity: Not Currently     Partners: Male   Other Topics Concern    Parent/sibling w/ CABG, MI or angioplasty before 65F 55M? No   Social History Narrative    Not on file     Social Drivers of Health     Financial Resource Strain: Low Risk  (9/24/2024)    Financial Resource Strain     Within the past 12 months, have you or your family members you live with been unable to get utilities (heat, electricity) when it was really needed?: No   Food Insecurity: Low Risk  (9/24/2024)    Food Insecurity     Within the past 12 months, did you worry that your food would run out before you got money to buy more?: No     Within the past 12 months, did the food you bought just not last and you didn t have money to get more?: No   Transportation Needs: Low Risk  (9/24/2024)    Transportation Needs     Within the past 12 months, has lack of transportation kept  you from medical appointments, getting your medicines, non-medical meetings or appointments, work, or from getting things that you need?: No   Physical Activity: Insufficiently Active (9/24/2024)    Exercise Vital Sign     Days of Exercise per Week: 2 days     Minutes of Exercise per Session: 30 min   Stress: No Stress Concern Present (9/24/2024)    [employer] [place] [employer] - Occupational Stress Questionnaire     Feeling of Stress : Only a little   Social Connections: Unknown (9/24/2024)    Social Connection and Isolation Panel [NHANES]     Frequency of Communication with Friends and Family: Not on file     Frequency of Social Gatherings with Friends and Family: Three times a week     Attends Congregation Services: Not on file     Active Member of Clubs or Organizations: Not on file     Attends Club or Organization Meetings: Not on file     Marital Status: Not on file   Interpersonal Safety: Not on file   Housing Stability: Low Risk  (9/24/2024)    Housing Stability     Do you have housing? : Yes     Are you worried about losing your housing?: No     FAMILY MEDICAL HISTORY:   Family History   Problem Relation Age of Onset    Hypertension Mother         dec.    Arthritis Mother     Gastrointestinal Disease Mother     Deep Vein Thrombosis Mother     Cancer Father         Lung    Other Cancer Father         Lung    Asthma Father     C.A.D. Paternal Grandmother         ?    Heart Disease Maternal Grandmother         ?    Hypertension Maternal Grandmother         dec.     PHYSICAL EXAM:   There were no vitals taken for this visit.  GENERAL APPEARANCE: alert and no distress  EYES: nonicteric  HENT: mouth without ulcers or lesions  NECK: supple, no adenopathy  RESP: lungs clear to auscultation   CV: regular rhythm, normal rate, no rub  ABDOMEN: soft, nontender, normal bowel sounds, no HSM   Extremities: no clubbing, cyanosis, or edema  MS: no evidence of inflammation in joints, no muscle tenderness  SKIN: no  rash  NEURO: mentation intact and speech normal  PSYCH: affect normal/bright   LABS:   Recent Results (from the past 4 weeks)   Renal panel    Collection Time: 06/16/25  1:22 PM   Result Value Ref Range    Sodium 139 135 - 145 mmol/L    Potassium 5.0 3.4 - 5.3 mmol/L    Chloride 103 98 - 107 mmol/L    Carbon Dioxide (CO2) 25 22 - 29 mmol/L    Anion Gap 11 7 - 15 mmol/L    Glucose 119 (H) 70 - 99 mg/dL    Urea Nitrogen 31.7 (H) 8.0 - 23.0 mg/dL    Creatinine 1.45 (H) 0.67 - 1.17 mg/dL    GFR Estimate 46 (L) >60 mL/min/1.73m2    Calcium 9.4 8.8 - 10.4 mg/dL    Albumin 4.1 3.5 - 5.2 g/dL    Phosphorus 4.1 2.5 - 4.5 mg/dL   CBC with platelets    Collection Time: 06/16/25  1:22 PM   Result Value Ref Range    WBC Count 7.4 4.0 - 11.0 10e3/uL    RBC Count 4.24 (L) 4.40 - 5.90 10e6/uL    Hemoglobin 13.9 13.3 - 17.7 g/dL    Hematocrit 41.6 40.0 - 53.0 %    MCV 98 78 - 100 fL    MCH 32.8 26.5 - 33.0 pg    MCHC 33.4 31.5 - 36.5 g/dL    RDW 14.5 10.0 - 15.0 %    Platelet Count 190 150 - 450 10e3/uL   Vitamin D Deficiency    Collection Time: 06/16/25  1:22 PM   Result Value Ref Range    Vitamin D, Total (25-Hydroxy) 57 (H) 20 - 50 ng/mL   Ferritin    Collection Time: 06/16/25  1:22 PM   Result Value Ref Range    Ferritin 253 31 - 409 ng/mL   Iron & Iron Binding Capacity    Collection Time: 06/16/25  1:22 PM   Result Value Ref Range    Iron 34 (L) 61 - 157 ug/dL    Iron Binding Capacity 243 240 - 430 ug/dL    Iron Sat Index 14 (L) 15 - 46 %   Parathyroid Hormone Intact    Collection Time: 06/16/25  1:25 PM   Result Value Ref Range    Parathyroid Hormone Intact 77 (H) 15 - 65 pg/mL   UA with Microscopic    Collection Time: 06/16/25  1:26 PM   Result Value Ref Range    Color Urine Yellow Colorless, Straw, Light Yellow, Yellow    Appearance Urine Clear Clear    Glucose Urine Negative Negative mg/dL    Bilirubin Urine Negative Negative    Ketones Urine Negative Negative mg/dL    Specific Gravity Urine 1.025 1.003 - 1.035    Blood  Urine Small (A) Negative    pH Urine 6.0 5.0 - 7.0    Protein Albumin Urine 30 (A) Negative mg/dL    Urobilinogen Urine 0.2 0.2, 1.0 E.U./dL    Nitrite Urine Negative Negative    Leukocyte Esterase Urine Negative Negative   Protein  random urine    Collection Time: 06/16/25  1:26 PM   Result Value Ref Range    Total Protein Urine mg/dL 44.9   mg/dL    Total Protein Urine mg/mg Creat 0.28 (H) 0.00 - 0.20 mg/mg Cr    Creatinine Urine mg/dL 162.0 mg/dL   Albumin Random Urine Quantitative with Creat Ratio    Collection Time: 06/16/25  1:26 PM   Result Value Ref Range    Creatinine Urine mg/dL 162.0 mg/dL    Albumin Urine mg/L 175.0 mg/L    Albumin Urine mg/g Cr 108.02 (H) 0.00 - 17.00 mg/g Cr   Urine Microscopic Exam    Collection Time: 06/16/25  1:26 PM   Result Value Ref Range    RBC Urine 0-2 0-2 /HPF /HPF    WBC Urine 0-5 0-5 /HPF /HPF     CMP  Recent Labs   Lab Test 06/16/25  1322 04/16/25  1106 03/28/25  1109 03/18/25  1104 03/06/25  1324 03/04/25  0647 03/02/25  0526 03/01/25  0555 02/28/25  1624 09/26/24  1043 05/14/24  1229 08/24/23  1006 07/22/21  1209 07/02/21  1106 06/21/21  1340 06/14/21  0653 06/12/21  2227    140 140 139   < > 143   < > 142 144 142   < > 138   < > 140 139 140 136   POTASSIUM 5.0 4.9 4.8 4.7   < > 4.2   < > 4.5 4.6 4.6   < > 4.2   < > 4.0 5.4* 4.4 3.9   CHLORIDE 103 105 106 104   < > 107   < > 106 107 107   < > 104   < > 110* 110* 112* 105   CO2 25 26 25 29   < > 25   < > 24 24 25   < > 24   < > 25 25 25 21   ANIONGAP 11 9 9 6*   < > 11   < > 12 13 10   < > 10   < > 5 4 3 10   * 105* 105* 157*   < > 105*   < > 105* 148* 95   < > 103*   < > 102* 92 117* 151*   BUN 31.7* 38.8* 37.5* 50.9*   < > 38.8*   < > 34.3* 36.6* 26.9*   < > 36.3*   < > 29 31* 52* 45*   CR 1.45* 1.47* 1.38* 1.67*   < > 1.41*   < > 1.39* 1.40* 1.20*   < > 1.20*   < > 1.35* 1.34* 2.07* 2.85*   GFRESTIMATED 46* 45* 49* 39*   < > 48*   < > 48* 48* 58*   < > 59*   < > 47* 48* 28* 19*   GFRESTBLACK  --   --   --    --   --   --   --   --   --   --   --   --   --  55* 55* 33* 22*   REY 9.4 9.5 9.5 9.2   < > 9.0   < > 8.9 9.1 9.4   < > 9.0   < > 8.6 9.2 7.8* 9.6   MAG  --   --   --   --   --  2.1  --  1.9  --   --   --   --   --   --   --   --   --    PHOS 4.1  --   --   --   --  3.6  --   --   --   --   --   --   --   --   --   --   --    PROTTOTAL  --   --   --   --   --   --   --   --  6.6 6.9  --  6.6  --   --   --  5.5* 7.6   ALBUMIN 4.1  --   --   --   --   --   --   --  4.0 4.2  --  4.2  --   --   --  2.3* 3.6   BILITOTAL  --   --   --   --   --   --   --   --  0.9 0.9  --  0.6  --   --   --  0.4 1.8*   ALKPHOS  --   --   --   --   --   --   --   --  94 61  --  53  --   --   --  44 64   AST  --   --   --   --   --   --   --   --  27 17  --  21  --   --   --  9 9   ALT  --   --   --   --   --   --   --   --  42 10  --  19  --   --   --  9 16    < > = values in this interval not displayed.     CBC  Recent Labs   Lab Test 06/16/25  1322 03/04/25  0647 03/01/25  0555 02/28/25  1624   HGB 13.9 14.7 13.4 14.2   WBC 7.4 5.8 6.0 7.1   RBC 4.24* 4.52 4.16* 4.36*   HCT 41.6 43.3 39.7* 43.0   MCV 98 96 95 99   MCH 32.8 32.5 32.2 32.6   MCHC 33.4 33.9 33.8 33.0   RDW 14.5 13.4 13.6 13.9    211 206 213     INR  Recent Labs   Lab Test 02/25/20  1517 02/22/20  2033 01/02/19  0133   INR 1.37* 1.00 1.06     ABGNo lab results found.   URINE STUDIES  Recent Labs   Lab Test 06/16/25  1326 06/13/21  1121 06/12/21  2345 04/12/21  1716 07/31/20  1506   COLOR Yellow Straw Yellow Yellow Yellow   APPEARANCE Clear Slightly Cloudy Clear Clear Clear   URINEGLC Negative Negative Negative Negative Negative   URINEBILI Negative Negative Negative Negative Negative   URINEKETONE Negative Negative 20* Negative Negative   SG 1.025 1.004 1.027 1.020 >1.030   UBLD Small* Large* Moderate* Negative Trace*   URINEPH 6.0 5.0 5.5 6.0 5.5   PROTEIN 30* Negative 10* Negative Negative   UROBILINOGEN 0.2  --   --  0.2 0.2   NITRITE Negative Negative Negative  Negative Negative   LEUKEST Negative Moderate* Negative Negative Negative   RBCU 0-2 >182* 17*  --  5-10*   WBCU 0-5 14* 3  --  0 - 5     No lab results found.    ASSESSMENT AND PLAN:   #Worsening CKD with moderate proteinuria occurring in the setting of recently diagnosed systolic dysfunction evoking the diagnosis of cardiac amyloidosis. I will rule out that possibility by obtaining a PYP scan and checking an MG work up. Given his history of obstructive nephropathy, I will also obtain a renal ultrasound to rule out any new obstruction. I will also message his cardiac team to evoke the possibility of decreasing metoprolol to 12.5 mg daily to improve eventually the renal perfusion.  The patient was also instructed to avoid NSAIDs     #H/O DVT on rivaroxaban appropriately dosed     #Blood count  Hemoglobin 13.9, normal and no acute issue    #Acid-base status  CO2 level 25  No need for sodium bicarbonate supplementation    #Electrolytes  Na 139  K 5 acceptable  No acute issues    #BMD  Calcium  9.4         Phosphorus 4.1   Albumin 4.1  Vitamin D level 57 no need for supplementation    #CKD journey/transplant not a candidate yet    The total time of this encounter amounted to 60 minutes on the day of the encounter. This time included time spent with the patient, reviewing records, ordering tests, and performing post visit documentation.     The patient will return to follow up in 3 months    Sara Lombardo MD  Division of Renal Disease and Hypertension  June 18, 2025  9:46 AM

## 2025-06-18 NOTE — NURSING NOTE
Chief Complaint   Patient presents with    New Patient     Stage 3a chronic kidney disease (H)       Vitals:    06/18/25 0946   BP: 99/63   Pulse: 69   SpO2: 98%   Weight: 65.3 kg (143 lb 14.4 oz)       Body mass index is 23.23 kg/m .

## 2025-06-19 LAB
ALBUMIN SERPL ELPH-MCNC: 3.8 G/DL (ref 3.7–5.1)
ALPHA1 GLOB SERPL ELPH-MCNC: 0.2 G/DL (ref 0.2–0.4)
ALPHA2 GLOB SERPL ELPH-MCNC: 0.6 G/DL (ref 0.5–0.9)
B-GLOBULIN SERPL ELPH-MCNC: 0.7 G/DL (ref 0.6–1)
GAMMA GLOB SERPL ELPH-MCNC: 0.8 G/DL (ref 0.7–1.6)
KAPPA LC FREE SER-MCNC: 2.65 MG/DL (ref 0.33–1.94)
KAPPA LC FREE/LAMBDA FREE SER NEPH: 1 {RATIO} (ref 0.26–1.65)
LAMBDA LC FREE SERPL-MCNC: 2.66 MG/DL (ref 0.57–2.63)
M PROTEIN SERPL ELPH-MCNC: 0 G/DL
PROT PATTERN SERPL ELPH-IMP: NORMAL
PROT PATTERN SERPL IFE-IMP: NORMAL

## 2025-07-02 ENCOUNTER — HOSPITAL ENCOUNTER (OUTPATIENT)
Dept: NUCLEAR MEDICINE | Facility: CLINIC | Age: 89
Setting detail: NUCLEAR MEDICINE
Discharge: HOME | End: 2025-07-02
Attending: INTERNAL MEDICINE
Payer: COMMERCIAL

## 2025-07-02 ENCOUNTER — HOSPITAL ENCOUNTER (OUTPATIENT)
Dept: ULTRASOUND IMAGING | Facility: CLINIC | Age: 89
Discharge: HOME | End: 2025-07-02
Attending: INTERNAL MEDICINE
Payer: COMMERCIAL

## 2025-07-02 DIAGNOSIS — I50.9 ACUTE ON CHRONIC CONGESTIVE HEART FAILURE, UNSPECIFIED HEART FAILURE TYPE (H): ICD-10-CM

## 2025-07-02 DIAGNOSIS — N18.31 STAGE 3A CHRONIC KIDNEY DISEASE (H): ICD-10-CM

## 2025-07-02 PROCEDURE — 76770 US EXAM ABDO BACK WALL COMP: CPT

## 2025-07-02 PROCEDURE — 343N000001 HC RX 343 MED OP 636: Performed by: INTERNAL MEDICINE

## 2025-07-02 PROCEDURE — 78830 RP LOCLZJ TUM SPECT W/CT 1: CPT

## 2025-07-02 PROCEDURE — A9561 TC99M OXIDRONATE: HCPCS | Performed by: INTERNAL MEDICINE

## 2025-07-02 RX ADMIN — TECHNETIUM TC 99M OXIDRONATE 15.5 MILLICURIE: 3.15 INJECTION, POWDER, LYOPHILIZED, FOR SOLUTION INTRAVENOUS at 10:57

## 2025-07-09 DIAGNOSIS — E85.82 WILD-TYPE TRANSTHYRETIN-RELATED (ATTR) AMYLOIDOSIS (H): Primary | ICD-10-CM

## 2025-07-10 ENCOUNTER — PATIENT OUTREACH (OUTPATIENT)
Dept: CARE COORDINATION | Facility: CLINIC | Age: 89
End: 2025-07-10
Payer: COMMERCIAL

## 2025-07-14 ENCOUNTER — PATIENT OUTREACH (OUTPATIENT)
Dept: CARE COORDINATION | Facility: CLINIC | Age: 89
End: 2025-07-14
Payer: COMMERCIAL

## 2025-08-16 ENCOUNTER — HEALTH MAINTENANCE LETTER (OUTPATIENT)
Age: 89
End: 2025-08-16

## 2025-08-30 DIAGNOSIS — I82.4Z1 DVT, LOWER EXTREMITY, DISTAL, ACUTE, RIGHT (H): ICD-10-CM

## 2025-09-02 RX ORDER — RIVAROXABAN 20 MG/1
20 TABLET, FILM COATED ORAL
Qty: 90 TABLET | Refills: 0 | Status: SHIPPED | OUTPATIENT
Start: 2025-09-02

## (undated) DEVICE — LASER FIBER HOLMIUM FLEXIVA 365UM M0068403920

## (undated) DEVICE — PACK CYSTOSCOPY SBA15CYFSI

## (undated) DEVICE — BAG CYSTO TABLE DRAIN

## (undated) DEVICE — LUBRICATING JELLY 4.25OZ

## (undated) DEVICE — SOL WATER IRRIG 1000ML BOTTLE 2F7114

## (undated) DEVICE — LINEN TOWEL PACK X5 5464

## (undated) DEVICE — GUIDEWIRE ZIPWIRE STR STIFF .035"X150CM M006630222B0

## (undated) DEVICE — CATH TRAY COUDE 16FR LATEX

## (undated) DEVICE — CATH URETERAL OPEN END 6FR AXXCESS

## (undated) DEVICE — CATH URETERAL DUAL LUMEN 10FRX54CM M0064051000

## (undated) DEVICE — SOL WATER IRRIG 3000ML BAG 2B7117

## (undated) DEVICE — PAD CHUX UNDERPAD 23X24" 7136

## (undated) DEVICE — GLOVE PROTEXIS W/NEU-THERA 7.5  2D73TE75

## (undated) DEVICE — SHEATH URETERAL ACCESS NAVIGATOR HD 13/15FRX46CM M0062502290

## (undated) DEVICE — CATH URETERAL FLEX TIP TIGERTAIL 06FRX70CM 139006

## (undated) DEVICE — Device

## (undated) DEVICE — SYR 50ML CATH TIP W/O NDL 309620

## (undated) RX ORDER — PROPOFOL 10 MG/ML
INJECTION, EMULSION INTRAVENOUS
Status: DISPENSED
Start: 2021-06-13

## (undated) RX ORDER — ONDANSETRON 2 MG/ML
INJECTION INTRAMUSCULAR; INTRAVENOUS
Status: DISPENSED
Start: 2021-06-13

## (undated) RX ORDER — FENTANYL CITRATE 50 UG/ML
INJECTION, SOLUTION INTRAMUSCULAR; INTRAVENOUS
Status: DISPENSED
Start: 2021-07-02

## (undated) RX ORDER — ATROPA BELLADONNA AND OPIUM 16.2; 3 MG/1; MG/1
SUPPOSITORY RECTAL
Status: DISPENSED
Start: 2021-07-02

## (undated) RX ORDER — FENTANYL CITRATE 50 UG/ML
INJECTION, SOLUTION INTRAMUSCULAR; INTRAVENOUS
Status: DISPENSED
Start: 2019-01-03

## (undated) RX ORDER — FENTANYL CITRATE 50 UG/ML
INJECTION, SOLUTION INTRAMUSCULAR; INTRAVENOUS
Status: DISPENSED
Start: 2021-06-13

## (undated) RX ORDER — LIDOCAINE HYDROCHLORIDE 20 MG/ML
INJECTION, SOLUTION EPIDURAL; INFILTRATION; INTRACAUDAL; PERINEURAL
Status: DISPENSED
Start: 2021-06-13

## (undated) RX ORDER — FUROSEMIDE 10 MG/ML
INJECTION INTRAMUSCULAR; INTRAVENOUS
Status: DISPENSED
Start: 2021-07-02

## (undated) RX ORDER — CEFAZOLIN SODIUM 2 G/100ML
INJECTION, SOLUTION INTRAVENOUS
Status: DISPENSED
Start: 2021-07-02

## (undated) RX ORDER — REGADENOSON 0.08 MG/ML
INJECTION, SOLUTION INTRAVENOUS
Status: DISPENSED
Start: 2025-05-22

## (undated) RX ORDER — CEFAZOLIN SODIUM 2 G/100ML
INJECTION, SOLUTION INTRAVENOUS
Status: DISPENSED
Start: 2021-06-13

## (undated) RX ORDER — DEXAMETHASONE SODIUM PHOSPHATE 4 MG/ML
INJECTION, SOLUTION INTRA-ARTICULAR; INTRALESIONAL; INTRAMUSCULAR; INTRAVENOUS; SOFT TISSUE
Status: DISPENSED
Start: 2021-07-02

## (undated) RX ORDER — DEXAMETHASONE SODIUM PHOSPHATE 4 MG/ML
INJECTION, SOLUTION INTRA-ARTICULAR; INTRALESIONAL; INTRAMUSCULAR; INTRAVENOUS; SOFT TISSUE
Status: DISPENSED
Start: 2021-06-13